# Patient Record
Sex: FEMALE | Race: WHITE | NOT HISPANIC OR LATINO | Employment: FULL TIME | URBAN - METROPOLITAN AREA
[De-identification: names, ages, dates, MRNs, and addresses within clinical notes are randomized per-mention and may not be internally consistent; named-entity substitution may affect disease eponyms.]

---

## 2023-02-28 PROBLEM — M50.30 DEGENERATION OF CERVICAL INTERVERTEBRAL DISC: Status: ACTIVE | Noted: 2023-02-28

## 2023-02-28 PROBLEM — M51.36 DEGENERATION OF LUMBAR INTERVERTEBRAL DISC: Status: ACTIVE | Noted: 2023-02-28

## 2023-02-28 PROBLEM — R10.9 ABDOMINAL PAIN: Status: ACTIVE | Noted: 2023-02-28

## 2023-02-28 PROBLEM — M51.369 DEGENERATION OF LUMBAR INTERVERTEBRAL DISC: Status: ACTIVE | Noted: 2023-02-28

## 2023-02-28 PROBLEM — G47.33 OBSTRUCTIVE SLEEP APNEA SYNDROME: Status: ACTIVE | Noted: 2020-06-23

## 2023-02-28 PROBLEM — K76.0 NONALCOHOLIC FATTY LIVER DISEASE: Status: ACTIVE | Noted: 2023-02-28

## 2023-02-28 RX ORDER — FAMOTIDINE 40 MG/1
1 TABLET, FILM COATED ORAL NIGHTLY
COMMUNITY

## 2023-02-28 RX ORDER — OMEPRAZOLE 40 MG/1
1 CAPSULE, DELAYED RELEASE ORAL DAILY
COMMUNITY
Start: 2022-07-11

## 2023-02-28 RX ORDER — HYDROXYZINE HYDROCHLORIDE 25 MG/1
1-2 TABLET, FILM COATED ORAL 2 TIMES DAILY PRN
COMMUNITY
Start: 2022-11-08 | End: 2023-05-08 | Stop reason: SDUPTHER

## 2023-02-28 RX ORDER — IBUPROFEN 600 MG/1
1 TABLET ORAL EVERY 8 HOURS PRN
COMMUNITY

## 2023-03-07 ENCOUNTER — APPOINTMENT (OUTPATIENT)
Dept: PRIMARY CARE | Facility: CLINIC | Age: 43
End: 2023-03-07
Payer: COMMERCIAL

## 2023-03-13 ENCOUNTER — TELEMEDICINE (OUTPATIENT)
Dept: PRIMARY CARE | Facility: CLINIC | Age: 43
End: 2023-03-13
Payer: COMMERCIAL

## 2023-03-13 DIAGNOSIS — J01.90 ACUTE SINUSITIS, RECURRENCE NOT SPECIFIED, UNSPECIFIED LOCATION: Primary | ICD-10-CM

## 2023-03-13 PROCEDURE — 99214 OFFICE O/P EST MOD 30 MIN: CPT | Performed by: FAMILY MEDICINE

## 2023-03-13 RX ORDER — AMOXICILLIN AND CLAVULANATE POTASSIUM 875; 125 MG/1; MG/1
875 TABLET, FILM COATED ORAL 2 TIMES DAILY
Qty: 20 TABLET | Refills: 0 | Status: SHIPPED | OUTPATIENT
Start: 2023-03-13 | End: 2023-03-23

## 2023-03-13 ASSESSMENT — ENCOUNTER SYMPTOMS
SHORTNESS OF BREATH: 0
FEVER: 1
FATIGUE: 0
MYALGIAS: 0
ARTHRALGIAS: 0
DIARRHEA: 0
ABDOMINAL PAIN: 0

## 2023-03-13 NOTE — PROGRESS NOTES
Subjective   Patient ID: Ronda Dean is a 42 y.o. female who presents telemedicine visit. Pt at home. PCP in office    HPI   Cough: onset x 5 days. Started w/ sore throat. Now w/ head congestion, cough, sinus congestion. [facility] to [facility] and told had bronchitis, given tessalon perles and inhaler. Not helping. Getting worse. Cough worse at night. No recent abx    Review of Systems   Constitutional:  Positive for fever. Negative for fatigue.   HENT:  Negative for congestion and postnasal drip.    Respiratory:  Negative for shortness of breath.    Cardiovascular:  Negative for chest pain.   Gastrointestinal:  Negative for abdominal pain and diarrhea.   Musculoskeletal:  Negative for arthralgias and myalgias.       Objective   There were no vitals taken for this visit.    Physical Exam  Pulmonary:      Effort: No respiratory distress.   Neurological:      Mental Status: She is alert.   Psychiatric:         Mood and Affect: Mood normal.         Assessment/Plan   Problem List Items Addressed This Visit    None  Visit Diagnoses       Acute sinusitis, recurrence not specified, unspecified location    -  Primary    Relevant Medications    amoxicillin-pot clavulanate (Augmentin) 875-125 mg tablet

## 2023-03-13 NOTE — PATIENT INSTRUCTIONS
Start antibiotic. Take w food and water.     Follow up if your symptoms are not improving but recommend you do take a home COVID test to appropriately quarantine if needed.

## 2023-03-15 ENCOUNTER — TELEPHONE (OUTPATIENT)
Dept: PRIMARY CARE | Facility: CLINIC | Age: 43
End: 2023-03-15
Payer: COMMERCIAL

## 2023-03-15 NOTE — TELEPHONE ENCOUNTER
If pts sinus symptoms better, ok to let the rest of her infection take its course. If numbness did not go away, needs seen. Go to ER if symptoms sig worsening  - IEL      [place] and informed [place] [facility] [place]

## 2023-03-15 NOTE — TELEPHONE ENCOUNTER
Patient was in with IEL was given Amoxicillin her face, lips and nose is numb. I told her not to take anymore until she hears from us and just took a pill at 10:00. [facility] [place]

## 2023-05-08 ENCOUNTER — TELEMEDICINE (OUTPATIENT)
Dept: PRIMARY CARE | Facility: CLINIC | Age: 43
End: 2023-05-08
Payer: COMMERCIAL

## 2023-05-08 DIAGNOSIS — G47.00 INSOMNIA, UNSPECIFIED TYPE: ICD-10-CM

## 2023-05-08 DIAGNOSIS — R51.9 NONINTRACTABLE HEADACHE, UNSPECIFIED CHRONICITY PATTERN, UNSPECIFIED HEADACHE TYPE: ICD-10-CM

## 2023-05-08 DIAGNOSIS — F32.A DEPRESSIVE DISORDER: Primary | ICD-10-CM

## 2023-05-08 PROBLEM — G89.29 CHRONIC ABDOMINAL PAIN: Status: ACTIVE | Noted: 2023-02-28

## 2023-05-08 PROBLEM — R74.8 ELEVATED LIVER ENZYMES: Status: ACTIVE | Noted: 2023-05-08

## 2023-05-08 PROBLEM — R32 URINARY INCONTINENCE: Status: ACTIVE | Noted: 2023-05-08

## 2023-05-08 PROBLEM — M48.02 SPINAL STENOSIS OF CERVICAL REGION: Status: ACTIVE | Noted: 2023-05-08

## 2023-05-08 PROBLEM — M62.89 HIGH-TONE PELVIC FLOOR DYSFUNCTION: Status: ACTIVE | Noted: 2023-05-08

## 2023-05-08 PROBLEM — K42.9 UMBILICAL HERNIA: Status: ACTIVE | Noted: 2023-05-08

## 2023-05-08 PROBLEM — M25.811 SHOULDER IMPINGEMENT, RIGHT: Status: ACTIVE | Noted: 2023-05-08

## 2023-05-08 PROBLEM — M25.561 KNEE PAIN, RIGHT: Status: ACTIVE | Noted: 2023-05-08

## 2023-05-08 PROBLEM — M50.20 HNP (HERNIATED NUCLEUS PULPOSUS), CERVICAL: Status: ACTIVE | Noted: 2023-02-28

## 2023-05-08 PROBLEM — M51.27 HERNIATED NUCLEUS PULPOSUS OF LUMBOSACRAL REGION: Status: ACTIVE | Noted: 2023-02-28

## 2023-05-08 PROBLEM — M79.2 NEUROPATHIC PAIN OF CHEST: Status: RESOLVED | Noted: 2023-05-08 | Resolved: 2023-05-08

## 2023-05-08 PROBLEM — M50.10 HERNIATION OF CERVICAL INTERVERTEBRAL DISC WITH RADICULOPATHY: Status: ACTIVE | Noted: 2023-05-08

## 2023-05-08 PROBLEM — M47.816 LUMBAR SPONDYLOSIS: Status: ACTIVE | Noted: 2023-05-08

## 2023-05-08 PROBLEM — M75.51 BURSITIS OF RIGHT SHOULDER: Status: ACTIVE | Noted: 2023-05-08

## 2023-05-08 PROBLEM — N20.0 NEPHROLITHIASIS: Status: RESOLVED | Noted: 2023-05-08 | Resolved: 2023-05-08

## 2023-05-08 PROBLEM — R30.0 DYSURIA: Status: RESOLVED | Noted: 2023-05-08 | Resolved: 2023-05-08

## 2023-05-08 PROBLEM — M54.2 CERVICALGIA: Status: ACTIVE | Noted: 2023-05-08

## 2023-05-08 PROBLEM — M19.011 ARTHRITIS OF RIGHT ACROMIOCLAVICULAR JOINT: Status: ACTIVE | Noted: 2023-05-08

## 2023-05-08 PROBLEM — N64.4 PAIN OF RIGHT BREAST: Status: RESOLVED | Noted: 2023-05-08 | Resolved: 2023-05-08

## 2023-05-08 PROBLEM — R39.15 URINARY URGENCY: Status: RESOLVED | Noted: 2023-05-08 | Resolved: 2023-05-08

## 2023-05-08 PROBLEM — K29.60 GASTRITIS, EROSIVE: Status: RESOLVED | Noted: 2023-05-08 | Resolved: 2023-05-08

## 2023-05-08 PROBLEM — K31.89 SUBEPITHELIAL GASTRIC MASS: Status: ACTIVE | Noted: 2023-05-08

## 2023-05-08 PROBLEM — S31.105A OPEN WOUND OF UMBILICAL REGION: Status: RESOLVED | Noted: 2023-05-08 | Resolved: 2023-05-08

## 2023-05-08 PROBLEM — R63.5 WEIGHT GAIN: Status: ACTIVE | Noted: 2023-05-08

## 2023-05-08 PROBLEM — M54.16 LUMBAR RADICULOPATHY: Status: ACTIVE | Noted: 2023-05-08

## 2023-05-08 PROBLEM — N89.8 VAGINAL DISCHARGE: Status: RESOLVED | Noted: 2023-05-08 | Resolved: 2023-05-08

## 2023-05-08 PROCEDURE — 99214 OFFICE O/P EST MOD 30 MIN: CPT | Performed by: FAMILY MEDICINE

## 2023-05-08 RX ORDER — BUPROPION HYDROCHLORIDE 150 MG/1
150 TABLET ORAL EVERY MORNING
Qty: 30 TABLET | Refills: 1 | Status: SHIPPED | OUTPATIENT
Start: 2023-05-08 | End: 2023-05-17 | Stop reason: SINTOL

## 2023-05-08 RX ORDER — HYDROXYZINE HYDROCHLORIDE 25 MG/1
25-50 TABLET, FILM COATED ORAL 2 TIMES DAILY PRN
Qty: 30 TABLET | Refills: 0 | Status: SHIPPED | OUTPATIENT
Start: 2023-05-08 | End: 2023-08-07

## 2023-05-08 ASSESSMENT — ENCOUNTER SYMPTOMS
DIZZINESS: 0
NAUSEA: 0
ARTHRALGIAS: 0
SHORTNESS OF BREATH: 0
FATIGUE: 1
ABDOMINAL PAIN: 0
MYALGIAS: 0
DYSURIA: 0
VOMITING: 0
PALPITATIONS: 0
DIARRHEA: 0
CONSTIPATION: 0
HEADACHES: 0

## 2023-05-08 NOTE — PATIENT INSTRUCTIONS
Restart Wellbutrin and prn hydroxy    Start quilipta.    Follow up with eye doctor to check your vision and eye symptoms.     Go to the ER if any of your symptoms are significantly worsening.     Follow up in 1 mo, sooner if needed

## 2023-05-08 NOTE — PROGRESS NOTES
"Subjective   Patient ID: Ronda Dean is a 43 y.o. female who presents for telemed visi. Pt at home. PCP in office. Pt consents to treatment    HPI   Mood: had flare after cleaning out moms home. Feels \"dover\". Appetite up. +crying spells. Not sleeping well. No SI/HI. Feeling dad and anxious. Restarted old wellbutrin. Helped but ran out. Also ran out of hydroxyzine    Headaches: having more headaches. Sister on quilipta. Would like to trial. Occ causing some N/vomiting. Left eye also twitching more. Causing some vision changes      Review of Systems   Constitutional:  Positive for fatigue.   HENT: Negative.     Eyes:         As above   Respiratory:  Negative for shortness of breath.    Cardiovascular:  Negative for chest pain and palpitations.   Gastrointestinal:  Negative for abdominal pain, constipation, diarrhea, nausea and vomiting.   Genitourinary:  Negative for dysuria.   Musculoskeletal:  Negative for arthralgias and myalgias.   Skin:  Negative for rash.   Neurological:  Negative for dizziness and headaches.   Psychiatric/Behavioral:          As above       Objective   There were no vitals taken for this visit.    Physical Exam  HENT:      Head: Normocephalic.      Nose:      Comments: No conversational dyspnea  Pulmonary:      Effort: No respiratory distress.   Neurological:      General: No focal deficit present.      Mental Status: She is alert.   Psychiatric:         Mood and Affect: Mood normal.      Comments: Occ tearful         Assessment/Plan   Problem List Items Addressed This Visit          Other    Depressive disorder - Primary    Relevant Medications    buPROPion XL (Wellbutrin XL) 150 mg 24 hr tablet     Other Visit Diagnoses       Nonintractable headache, unspecified chronicity pattern, unspecified headache type        Relevant Medications    atogepant 10 mg tablet    Insomnia, unspecified type        Relevant Medications    hydrOXYzine HCL (Atarax) 25 mg tablet        Consider cymbalta if " does not do well w/ meds above per pt request.      Telemed 16 min

## 2023-05-09 ENCOUNTER — TELEPHONE (OUTPATIENT)
Dept: PRIMARY CARE | Facility: CLINIC | Age: 43
End: 2023-05-09
Payer: COMMERCIAL

## 2023-05-10 ENCOUNTER — TELEPHONE (OUTPATIENT)
Dept: PRIMARY CARE | Facility: CLINIC | Age: 43
End: 2023-05-10
Payer: COMMERCIAL

## 2023-05-10 NOTE — TELEPHONE ENCOUNTER
Pt called and said she got a referral on the portal shes not sure what its for it doesn't say anything on it. She has been seen for ha's so she thought maybe that's what it is. I didn't see anything in her chart so I wasn't sure either. Please advise/ mk

## 2023-05-12 NOTE — TELEPHONE ENCOUNTER
Patient called and stated that if we do not haile her Prior Auth Urgent for Atogepant it will take 8 days. Patient is asking for us to please haile it Urgent. She really needs this medicine it is for Migraines.

## 2023-05-15 ENCOUNTER — TELEPHONE (OUTPATIENT)
Dept: PRIMARY CARE | Facility: CLINIC | Age: 43
End: 2023-05-15
Payer: COMMERCIAL

## 2023-05-15 NOTE — TELEPHONE ENCOUNTER
Patient states JEREMY put her on Wellbutrin has been on it over a week she can not sleep has been crying and very anxious. IEL had told her if this did not work that she would try her on Cymbalta. Could that be called in?

## 2023-05-17 DIAGNOSIS — F32.A DEPRESSIVE DISORDER: Primary | ICD-10-CM

## 2023-05-17 RX ORDER — DULOXETIN HYDROCHLORIDE 20 MG/1
20 CAPSULE, DELAYED RELEASE ORAL DAILY
Qty: 30 CAPSULE | Refills: 0 | Status: SHIPPED | OUTPATIENT
Start: 2023-05-17 | End: 2023-08-07

## 2023-05-26 ENCOUNTER — TELEPHONE (OUTPATIENT)
Dept: PRIMARY CARE | Facility: CLINIC | Age: 43
End: 2023-05-26
Payer: COMMERCIAL

## 2023-06-08 DIAGNOSIS — F32.A DEPRESSIVE DISORDER: ICD-10-CM

## 2023-06-08 NOTE — TELEPHONE ENCOUNTER
Pharmacy request  Next OV 6/15  Pt should be ok on medication until next appt, please refuse. Thanks, AM

## 2023-06-09 RX ORDER — DULOXETIN HYDROCHLORIDE 20 MG/1
20 CAPSULE, DELAYED RELEASE ORAL DAILY
Qty: 30 CAPSULE | Refills: 0 | OUTPATIENT
Start: 2023-06-09 | End: 2023-07-09

## 2023-06-15 ENCOUNTER — APPOINTMENT (OUTPATIENT)
Dept: PRIMARY CARE | Facility: CLINIC | Age: 43
End: 2023-06-15
Payer: COMMERCIAL

## 2023-07-13 ENCOUNTER — APPOINTMENT (OUTPATIENT)
Dept: PRIMARY CARE | Facility: CLINIC | Age: 43
End: 2023-07-13
Payer: COMMERCIAL

## 2023-08-05 LAB
ALANINE AMINOTRANSFERASE (SGPT) (U/L) IN SER/PLAS: 94 U/L (ref 7–45)
ALBUMIN (G/DL) IN SER/PLAS: 4.4 G/DL (ref 3.4–5)
ALKALINE PHOSPHATASE (U/L) IN SER/PLAS: 81 U/L (ref 33–110)
ANION GAP IN SER/PLAS: 12 MMOL/L (ref 10–20)
ASPARTATE AMINOTRANSFERASE (SGOT) (U/L) IN SER/PLAS: 52 U/L (ref 9–39)
BILIRUBIN TOTAL (MG/DL) IN SER/PLAS: 1.1 MG/DL (ref 0–1.2)
CALCIUM (MG/DL) IN SER/PLAS: 9.5 MG/DL (ref 8.6–10.3)
CARBON DIOXIDE, TOTAL (MMOL/L) IN SER/PLAS: 25 MMOL/L (ref 21–32)
CHLORIDE (MMOL/L) IN SER/PLAS: 109 MMOL/L (ref 98–107)
CHOLESTEROL (MG/DL) IN SER/PLAS: 249 MG/DL (ref 0–199)
CHOLESTEROL IN HDL (MG/DL) IN SER/PLAS: 55.4 MG/DL
CHOLESTEROL/HDL RATIO: 4.5
CREATININE (MG/DL) IN SER/PLAS: 0.76 MG/DL (ref 0.5–1.05)
GFR FEMALE: >90 ML/MIN/1.73M2
GLUCOSE (MG/DL) IN SER/PLAS: 77 MG/DL (ref 74–99)
LDL: 161 MG/DL (ref 0–99)
POTASSIUM (MMOL/L) IN SER/PLAS: 4.2 MMOL/L (ref 3.5–5.3)
PROTEIN TOTAL: 6.4 G/DL (ref 6.4–8.2)
SODIUM (MMOL/L) IN SER/PLAS: 142 MMOL/L (ref 136–145)
TRIGLYCERIDE (MG/DL) IN SER/PLAS: 163 MG/DL (ref 0–149)
UREA NITROGEN (MG/DL) IN SER/PLAS: 15 MG/DL (ref 6–23)
VLDL: 33 MG/DL (ref 0–40)

## 2023-08-07 ENCOUNTER — LAB (OUTPATIENT)
Dept: LAB | Facility: LAB | Age: 43
End: 2023-08-07
Payer: COMMERCIAL

## 2023-08-07 ENCOUNTER — OFFICE VISIT (OUTPATIENT)
Dept: PRIMARY CARE | Facility: CLINIC | Age: 43
End: 2023-08-07
Payer: COMMERCIAL

## 2023-08-07 VITALS
SYSTOLIC BLOOD PRESSURE: 120 MMHG | TEMPERATURE: 97.6 F | OXYGEN SATURATION: 96 % | DIASTOLIC BLOOD PRESSURE: 74 MMHG | BODY MASS INDEX: 26.15 KG/M2 | HEART RATE: 81 BPM | WEIGHT: 162 LBS

## 2023-08-07 DIAGNOSIS — L65.9 HAIR LOSS: ICD-10-CM

## 2023-08-07 DIAGNOSIS — E78.2 MIXED HYPERLIPIDEMIA: Primary | ICD-10-CM

## 2023-08-07 DIAGNOSIS — R74.8 ELEVATED LIVER ENZYMES: ICD-10-CM

## 2023-08-07 DIAGNOSIS — K76.0 NONALCOHOLIC FATTY LIVER DISEASE: ICD-10-CM

## 2023-08-07 DIAGNOSIS — R53.83 OTHER FATIGUE: ICD-10-CM

## 2023-08-07 DIAGNOSIS — R60.9 EDEMA, UNSPECIFIED TYPE: ICD-10-CM

## 2023-08-07 LAB
ALANINE AMINOTRANSFERASE (SGPT) (U/L) IN SER/PLAS: 67 U/L (ref 7–45)
ALBUMIN (G/DL) IN SER/PLAS: 5 G/DL (ref 3.4–5)
ALKALINE PHOSPHATASE (U/L) IN SER/PLAS: 84 U/L (ref 33–110)
ANION GAP IN SER/PLAS: 13 MMOL/L (ref 10–20)
ASPARTATE AMINOTRANSFERASE (SGOT) (U/L) IN SER/PLAS: 31 U/L (ref 9–39)
BASOPHILS (10*3/UL) IN BLOOD BY AUTOMATED COUNT: 0.05 X10E9/L (ref 0–0.1)
BASOPHILS/100 LEUKOCYTES IN BLOOD BY AUTOMATED COUNT: 0.8 % (ref 0–2)
BILIRUBIN TOTAL (MG/DL) IN SER/PLAS: 1.4 MG/DL (ref 0–1.2)
CALCIDIOL (25 OH VITAMIN D3) (NG/ML) IN SER/PLAS: 27 NG/ML
CALCIUM (MG/DL) IN SER/PLAS: 9.8 MG/DL (ref 8.6–10.3)
CARBON DIOXIDE, TOTAL (MMOL/L) IN SER/PLAS: 27 MMOL/L (ref 21–32)
CHLORIDE (MMOL/L) IN SER/PLAS: 106 MMOL/L (ref 98–107)
COBALAMIN (VITAMIN B12) (PG/ML) IN SER/PLAS: 360 PG/ML (ref 211–911)
CREATININE (MG/DL) IN SER/PLAS: 0.83 MG/DL (ref 0.5–1.05)
EOSINOPHILS (10*3/UL) IN BLOOD BY AUTOMATED COUNT: 0.07 X10E9/L (ref 0–0.7)
EOSINOPHILS/100 LEUKOCYTES IN BLOOD BY AUTOMATED COUNT: 1.2 % (ref 0–6)
ERYTHROCYTE DISTRIBUTION WIDTH (RATIO) BY AUTOMATED COUNT: 12.8 % (ref 11.5–14.5)
ERYTHROCYTE MEAN CORPUSCULAR HEMOGLOBIN CONCENTRATION (G/DL) BY AUTOMATED: 33.2 G/DL (ref 32–36)
ERYTHROCYTE MEAN CORPUSCULAR VOLUME (FL) BY AUTOMATED COUNT: 90 FL (ref 80–100)
ERYTHROCYTES (10*6/UL) IN BLOOD BY AUTOMATED COUNT: 4.68 X10E12/L (ref 4–5.2)
GFR FEMALE: >90 ML/MIN/1.73M2
GLUCOSE (MG/DL) IN SER/PLAS: 79 MG/DL (ref 74–99)
HEMATOCRIT (%) IN BLOOD BY AUTOMATED COUNT: 41.9 % (ref 36–46)
HEMOGLOBIN (G/DL) IN BLOOD: 13.9 G/DL (ref 12–16)
IMMATURE GRANULOCYTES/100 LEUKOCYTES IN BLOOD BY AUTOMATED COUNT: 0.3 % (ref 0–0.9)
LEUKOCYTES (10*3/UL) IN BLOOD BY AUTOMATED COUNT: 6.1 X10E9/L (ref 4.4–11.3)
LYMPHOCYTES (10*3/UL) IN BLOOD BY AUTOMATED COUNT: 2.49 X10E9/L (ref 1.2–4.8)
LYMPHOCYTES/100 LEUKOCYTES IN BLOOD BY AUTOMATED COUNT: 41 % (ref 13–44)
MONOCYTES (10*3/UL) IN BLOOD BY AUTOMATED COUNT: 0.42 X10E9/L (ref 0.1–1)
MONOCYTES/100 LEUKOCYTES IN BLOOD BY AUTOMATED COUNT: 6.9 % (ref 2–10)
NEUTROPHILS (10*3/UL) IN BLOOD BY AUTOMATED COUNT: 3.02 X10E9/L (ref 1.2–7.7)
NEUTROPHILS/100 LEUKOCYTES IN BLOOD BY AUTOMATED COUNT: 49.8 % (ref 40–80)
PLATELETS (10*3/UL) IN BLOOD AUTOMATED COUNT: 181 X10E9/L (ref 150–450)
POTASSIUM (MMOL/L) IN SER/PLAS: 3.9 MMOL/L (ref 3.5–5.3)
PROTEIN TOTAL: 7 G/DL (ref 6.4–8.2)
SODIUM (MMOL/L) IN SER/PLAS: 142 MMOL/L (ref 136–145)
THYROTROPIN (MIU/L) IN SER/PLAS BY DETECTION LIMIT <= 0.05 MIU/L: 1.97 MIU/L (ref 0.44–3.98)
UREA NITROGEN (MG/DL) IN SER/PLAS: 12 MG/DL (ref 6–23)

## 2023-08-07 PROCEDURE — 82306 VITAMIN D 25 HYDROXY: CPT

## 2023-08-07 PROCEDURE — 1036F TOBACCO NON-USER: CPT | Performed by: FAMILY MEDICINE

## 2023-08-07 PROCEDURE — 99214 OFFICE O/P EST MOD 30 MIN: CPT | Performed by: FAMILY MEDICINE

## 2023-08-07 PROCEDURE — 86038 ANTINUCLEAR ANTIBODIES: CPT

## 2023-08-07 PROCEDURE — 82607 VITAMIN B-12: CPT

## 2023-08-07 PROCEDURE — 84443 ASSAY THYROID STIM HORMONE: CPT

## 2023-08-07 PROCEDURE — 80074 ACUTE HEPATITIS PANEL: CPT

## 2023-08-07 PROCEDURE — 36415 COLL VENOUS BLD VENIPUNCTURE: CPT

## 2023-08-07 PROCEDURE — 80053 COMPREHEN METABOLIC PANEL: CPT

## 2023-08-07 PROCEDURE — 85025 COMPLETE CBC W/AUTO DIFF WBC: CPT

## 2023-08-07 RX ORDER — METFORMIN HYDROCHLORIDE 500 MG/1
TABLET, EXTENDED RELEASE ORAL
COMMUNITY
Start: 2023-05-25 | End: 2024-04-08 | Stop reason: ALTCHOICE

## 2023-08-07 ASSESSMENT — ENCOUNTER SYMPTOMS
DIARRHEA: 0
PALPITATIONS: 0
FATIGUE: 1
DIFFICULTY URINATING: 0
SLEEP DISTURBANCE: 0
POLYDIPSIA: 0
DYSURIA: 0
DYSPHORIC MOOD: 0
ARTHRALGIAS: 0
DIZZINESS: 0
SHORTNESS OF BREATH: 0
VOMITING: 0
CONSTIPATION: 0
NAUSEA: 0
HEADACHES: 0
BLOOD IN STOOL: 0
POLYPHAGIA: 0

## 2023-08-07 NOTE — PROGRESS NOTES
Subjective   Patient ID: Ronda Dean is a 43 y.o. female who presents for Hyperlipidemia and Elevated LFTs (Recheck. Review labs.) and multiple issues.     Hyperlipidemia  Pertinent negatives include no chest pain or shortness of breath.      Lipids: high. HDL 55, (146), . Has been working with nutrition.   LFTs: high. ALT 94, AST 52. Told did not have fatty liver by GI. Has not seen hepatology. Has chronic RUQ pain  Edema: noticing more when active, mostly hands, feet, legs.   Hair loss: onset x 1-2 months ago.     Review of Systems   Constitutional:  Positive for fatigue.   HENT: Negative.     Eyes:  Negative for visual disturbance.   Respiratory:  Negative for shortness of breath.    Cardiovascular:  Negative for chest pain and palpitations.   Gastrointestinal:  Negative for blood in stool, constipation, diarrhea, nausea and vomiting.   Endocrine: Negative for cold intolerance, heat intolerance, polydipsia, polyphagia and polyuria.   Genitourinary:  Negative for difficulty urinating and dysuria.   Musculoskeletal:  Negative for arthralgias.   Skin:  Negative for rash.   Neurological:  Negative for dizziness and headaches.   Psychiatric/Behavioral:  Negative for dysphoric mood and sleep disturbance.        Objective   /74   Pulse 81   Temp 36.4 °C (97.6 °F)   Wt 73.5 kg (162 lb)   SpO2 96%   BMI 26.15 kg/m²     Physical Exam  Nursing note reviewed.   Constitutional:       General: She is not in acute distress.     Appearance: Normal appearance. She is not toxic-appearing.   HENT:      Head: Normocephalic.      Mouth/Throat:      Pharynx: Oropharynx is clear.   Eyes:      Pupils: Pupils are equal, round, and reactive to light.   Cardiovascular:      Rate and Rhythm: Normal rate and regular rhythm.      Pulses: Normal pulses.      Heart sounds: No murmur heard.  Pulmonary:      Effort: Pulmonary effort is normal. No respiratory distress.      Breath sounds: Normal breath sounds.    Abdominal:      General: Bowel sounds are normal.      Palpations: Abdomen is soft.      Tenderness: There is no abdominal tenderness. There is no guarding.   Musculoskeletal:         General: No tenderness.   Skin:     General: Skin is warm.      Comments: No obvious hair loss   Neurological:      General: No focal deficit present.      Mental Status: She is alert.      Cranial Nerves: No cranial nerve deficit.   Psychiatric:         Mood and Affect: Mood normal.         Assessment/Plan   Problem List Items Addressed This Visit          Cardiac and Vasculature    Mixed hyperlipidemia - Primary       Gastrointestinal and Abdominal    Nonalcoholic fatty liver disease    Relevant Orders    Comprehensive Metabolic Panel    Elevated liver enzymes    Relevant Orders    Referral to Hepatology    Hepatitis panel, acute       Skin    Hair loss     Other Visit Diagnoses       Edema, unspecified type        Relevant Orders    KOSTA with Reflex to GOLDIE    Other fatigue        Relevant Orders    Vitamin B12    Vitamin D, Total    CBC and Auto Differential    TSH with reflex to Free T4 if abnormal        Discussed bw

## 2023-08-07 NOTE — PATIENT INSTRUCTIONS
Recommend a predominant whole foods plant based diet.  Cut back on meat, dairy, salt and oils. Increase fiber in your diet.  Decrease alcohol as much as possible if you drink. Recommend regular exercise most days of the week.    You were referred for further blood work and to liver specialist    Go to the ER if any of your symptoms are significantly worsening.     Follow up in 3 months, sooner if needed

## 2023-08-08 ENCOUNTER — TELEPHONE (OUTPATIENT)
Dept: PRIMARY CARE | Facility: CLINIC | Age: 43
End: 2023-08-08
Payer: COMMERCIAL

## 2023-08-08 LAB
ANTI-NUCLEAR ANTIBODY (ANA): NEGATIVE
HEPATITIS A VIRUS IGM AB PRESENCE IN SER/PLAS BY IMMUNOASSAY: NONREACTIVE
HEPATITIS B VIRUS CORE IGM AB PRESENCE IN SER/PLAS BY IMMUNOASSY: NONREACTIVE
HEPATITIS B VIRUS SURFACE AG PRESENCE IN SERUM: NONREACTIVE
HEPATITIS C VIRUS AB PRESENCE IN SERUM: NONREACTIVE

## 2023-08-08 NOTE — TELEPHONE ENCOUNTER
Pt cannot get into hepatologists office until December 5th, asking if IEL can suggest anything for her to do, is there any other hepatologist she can see?

## 2023-08-11 NOTE — TELEPHONE ENCOUNTER
Pt notified of message she said she did call her GI and they were able to get her in 2 wks. Jose Miguel

## 2023-09-15 LAB
CLUE CELLS: ABNORMAL
NUGENT SCORE: 4
VAGINITIS-BV + YEAST INTERPRETATION: ABNORMAL
YEAST: ABNORMAL

## 2023-09-19 LAB
ALANINE AMINOTRANSFERASE (SGPT) (U/L) IN SER/PLAS: 24 U/L (ref 7–45)
ALBUMIN (G/DL) IN SER/PLAS: 5 G/DL (ref 3.4–5)
ALKALINE PHOSPHATASE (U/L) IN SER/PLAS: 85 U/L (ref 33–110)
ASPARTATE AMINOTRANSFERASE (SGOT) (U/L) IN SER/PLAS: 20 U/L (ref 9–39)
BILIRUBIN DIRECT (MG/DL) IN SER/PLAS: 0.1 MG/DL (ref 0–0.3)
BILIRUBIN TOTAL (MG/DL) IN SER/PLAS: 0.9 MG/DL (ref 0–1.2)
CERULOPLASMIN (MG/DL) IN SER/PLAS: 36 MG/DL (ref 20–60)
FERRITIN (UG/LL) IN SER/PLAS: 199 UG/L (ref 8–150)
IRON (UG/DL) IN SER/PLAS: 76 UG/DL (ref 35–150)
IRON BINDING CAPACITY (UG/DL) IN SER/PLAS: 340 UG/DL (ref 240–445)
IRON SATURATION (%) IN SER/PLAS: 22 % (ref 25–45)
PROTEIN TOTAL: 7.4 G/DL (ref 6.4–8.2)

## 2023-09-20 LAB — ANTI-SMOOTH MUSCLE ANTIBODY: NEGATIVE

## 2023-09-21 ENCOUNTER — APPOINTMENT (OUTPATIENT)
Dept: PRIMARY CARE | Facility: CLINIC | Age: 43
End: 2023-09-21
Payer: COMMERCIAL

## 2023-09-22 ENCOUNTER — APPOINTMENT (OUTPATIENT)
Dept: PRIMARY CARE | Facility: CLINIC | Age: 43
End: 2023-09-22
Payer: COMMERCIAL

## 2023-09-27 ENCOUNTER — OFFICE VISIT (OUTPATIENT)
Dept: PRIMARY CARE | Facility: CLINIC | Age: 43
End: 2023-09-27
Payer: COMMERCIAL

## 2023-09-27 VITALS
SYSTOLIC BLOOD PRESSURE: 120 MMHG | TEMPERATURE: 97.8 F | WEIGHT: 164 LBS | OXYGEN SATURATION: 99 % | HEIGHT: 66 IN | BODY MASS INDEX: 26.36 KG/M2 | DIASTOLIC BLOOD PRESSURE: 78 MMHG | HEART RATE: 74 BPM

## 2023-09-27 DIAGNOSIS — M26.609 TMJ DYSFUNCTION: ICD-10-CM

## 2023-09-27 DIAGNOSIS — Z00.00 WELLNESS EXAMINATION: Primary | ICD-10-CM

## 2023-09-27 PROCEDURE — 1036F TOBACCO NON-USER: CPT | Performed by: FAMILY MEDICINE

## 2023-09-27 PROCEDURE — 99396 PREV VISIT EST AGE 40-64: CPT | Performed by: FAMILY MEDICINE

## 2023-09-27 RX ORDER — ESTRADIOL 1 MG/1
1 TABLET ORAL DAILY
COMMUNITY
Start: 2023-09-13 | End: 2024-04-08 | Stop reason: ALTCHOICE

## 2023-09-27 ASSESSMENT — ENCOUNTER SYMPTOMS
NAUSEA: 0
PALPITATIONS: 0
FATIGUE: 0
CHILLS: 0
DIZZINESS: 0
COUGH: 0
APPETITE CHANGE: 0
SHORTNESS OF BREATH: 0
DIARRHEA: 0
VOMITING: 0
DYSURIA: 0
SLEEP DISTURBANCE: 0
FEVER: 0
SORE THROAT: 0
CONSTIPATION: 0
RHINORRHEA: 0

## 2023-09-27 NOTE — PATIENT INSTRUCTIONS
- Check with insurance to see hepatitis A vaccine to see if covered.    - Look at [employer] massage for TMJ: [email or web address] - Zayra Hobson

## 2023-10-03 ENCOUNTER — APPOINTMENT (OUTPATIENT)
Dept: UROLOGY | Facility: CLINIC | Age: 43
End: 2023-10-03
Payer: COMMERCIAL

## 2023-10-04 ENCOUNTER — TELEPHONE (OUTPATIENT)
Dept: PRIMARY CARE | Facility: CLINIC | Age: 43
End: 2023-10-04

## 2023-10-04 ENCOUNTER — APPOINTMENT (OUTPATIENT)
Dept: OBSTETRICS AND GYNECOLOGY | Facility: CLINIC | Age: 43
End: 2023-10-04
Payer: COMMERCIAL

## 2023-10-04 ENCOUNTER — OFFICE VISIT (OUTPATIENT)
Dept: PRIMARY CARE | Facility: CLINIC | Age: 43
End: 2023-10-04
Payer: COMMERCIAL

## 2023-10-04 VITALS
TEMPERATURE: 97.1 F | HEART RATE: 66 BPM | WEIGHT: 165 LBS | OXYGEN SATURATION: 98 % | DIASTOLIC BLOOD PRESSURE: 74 MMHG | SYSTOLIC BLOOD PRESSURE: 120 MMHG | BODY MASS INDEX: 26.63 KG/M2

## 2023-10-04 DIAGNOSIS — R10.11 RIGHT UPPER QUADRANT ABDOMINAL PAIN: Primary | ICD-10-CM

## 2023-10-04 PROCEDURE — 99213 OFFICE O/P EST LOW 20 MIN: CPT | Performed by: FAMILY MEDICINE

## 2023-10-04 PROCEDURE — 1036F TOBACCO NON-USER: CPT | Performed by: FAMILY MEDICINE

## 2023-10-04 ASSESSMENT — ENCOUNTER SYMPTOMS
APPETITE CHANGE: 0
CHILLS: 0
ABDOMINAL PAIN: 1
FEVER: 0
SHORTNESS OF BREATH: 0

## 2023-10-04 NOTE — PROGRESS NOTES
Assessment/Plan   ASSESSMENT/PLAN:      Ronda was seen today for abdominal pain.  Diagnoses and all orders for this visit:  Right upper quadrant abdominal pain (Primary)  -     CT abdomen pelvis wo IV contrast; Future       Patient Instructions   Abdominal pain: Due to presentation with guarding and + right-sided CVA tenderness, will order CT abdomen pelvis to rule out nephrolithiasis, reviewed previous imaging and patient has history of cholecystectomy.  ED precautions discussed with patient    Oli Ballesteros DO     FUTURE DIRECTION:     Subjective   SUBJECTIVE:     Patient ID: Ronda Dean is a 43 y.o. femalefor the following:    RUQ Pain   Ongoign for the past 3 weeks   Mentions history of cholecystectomy   Feels like something pushing on her right side  Food does not change symptoms   Pain is constant   Denies vomiting,diarrhea, nausea, fevers, chills    Review of Systems   Constitutional:  Negative for appetite change, chills and fever.   Respiratory:  Negative for shortness of breath.    Cardiovascular:  Negative for chest pain.   Gastrointestinal:  Positive for abdominal pain.       Allergies   Allergen Reactions    Pregabalin Other     paranoid    Bupropion Headache     HAs    Gabapentin Other     fatigue    Hydrochlorothiazide Other     N    Naltrexone-Bupropion Headache     headaches    Ondansetron Hcl Other     worsening N    Promethazine Other     nausea / vomiting    Trazodone Headache    Penicillins Rash         Current Outpatient Medications:     ALBUTEROL SULFATE INHL, Inhale 1-2 puffs. Every 4-6 hours as needed Albuterol sulfate  (90 Base) mcg/Act inhalation aerosol, Disp: , Rfl:     estradiol (Estrace) 1 mg tablet, Take 1 tablet (1 mg) by mouth once daily., Disp: , Rfl:     famotidine (Pepcid) 40 mg tablet, Take 1 tablet (40 mg) by mouth once daily at bedtime., Disp: , Rfl:     ibuprofen 600 mg tablet, Take 1 tablet (600 mg) by mouth every 8 hours if needed. W/ food and water,  Disp: , Rfl:     Lactobacillus acidophilus (PROBIOTIC ACIDOPHILUS ORAL), Probiotic Acidophilus oral capsules, Disp: , Rfl:     metFORMIN XR (Glucophage-XR) 500 mg 24 hr tablet, TAKE 1 TABLET BY MOUTH DAILY WITH BREAKFAST FOR 15 DAYS, THEN 2 TABLETS DAILY WITH BREAKFAST., Disp: , Rfl:     omeprazole (PriLOSEC) 40 mg DR capsule, Take 1 capsule (40 mg) by mouth once daily., Disp: , Rfl:     VITAMIN B COMPLEX ORAL, Vitamin B Complex oral tablets, Disp: , Rfl:      Patient Active Problem List   Diagnosis    Mixed hyperlipidemia    Depressive disorder    Irritable bowel syndrome    Hyperlipidemia    Abnormal liver function tests    Obstructive sleep apnea syndrome    Chronic abdominal pain    HNP (herniated nucleus pulposus), cervical    Herniated nucleus pulposus of lumbosacral region    Nonalcoholic fatty liver disease    Arthritis of right acromioclavicular joint    Bursitis of right shoulder    Spinal stenosis of cervical region    Cervicalgia    Chronic migraine    Elevated liver enzymes    High-tone pelvic floor dysfunction    Knee pain, right    Lumbar spondylosis    Shoulder impingement, right    Subepithelial gastric mass    Umbilical hernia    Weight gain    Urinary incontinence    Herniation of cervical intervertebral disc with radiculopathy    Lumbar radiculopathy    Hair loss    TMJ dysfunction       Social History     Socioeconomic History    Marital status:      Spouse name: Not on file    Number of children: Not on file    Years of education: Not on file    Highest education level: Not on file   Occupational History    Not on file   Tobacco Use    Smoking status: Never    Smokeless tobacco: Never   Substance and Sexual Activity    Alcohol use: Not on file    Drug use: Not on file    Sexual activity: Not on file   Other Topics Concern    Not on file   Social History Narrative    Not on file     Social Determinants of Health     Financial Resource Strain: Not on file   Food Insecurity: Not on file    Transportation Needs: Not on file   Physical Activity: Not on file   Stress: Not on file   Social Connections: Not on file   Intimate Partner Violence: Not on file   Housing Stability: Not on file       Objective   OBJECTIVE:     Vitals:    10/04/23 1542   BP: 120/74   Pulse: 66   Temp: 36.2 °C (97.1 °F)   SpO2: 98%       Exam      Physical Exam  Constitutional:       Appearance: Normal appearance.   HENT:      Head: Normocephalic.   Cardiovascular:      Rate and Rhythm: Normal rate and regular rhythm.      Heart sounds: No murmur heard.  Pulmonary:      Effort: Pulmonary effort is normal.      Breath sounds: No wheezing.   Abdominal:      Tenderness: There is abdominal tenderness. There is right CVA tenderness and guarding. There is no left CVA tenderness.   Musculoskeletal:      Cervical back: Normal range of motion.   Neurological:      Mental Status: She is alert.   Psychiatric:         Mood and Affect: Mood normal.

## 2023-10-04 NOTE — TELEPHONE ENCOUNTER
Patient in office needs Stat CT abd Pelvis Case# [id number] Auth# [id number] good from 10-4-2023- 4-1-2023   After cataract, bilateral    H/O bilateral inguinal hernia repair    S/P CABG (coronary artery bypass graft)

## 2023-10-04 NOTE — PATIENT INSTRUCTIONS
Abdominal pain: Due to presentation with guarding and + right-sided CVA tenderness, will order CT abdomen pelvis to rule out nephrolithiasis, reviewed previous imaging and patient has history of cholecystectomy.  ED precautions discussed with patient

## 2023-10-05 ENCOUNTER — HOSPITAL ENCOUNTER (OUTPATIENT)
Dept: RADIOLOGY | Facility: HOSPITAL | Age: 43
Discharge: HOME | End: 2023-10-05
Payer: COMMERCIAL

## 2023-10-05 DIAGNOSIS — R10.11 RIGHT UPPER QUADRANT ABDOMINAL PAIN: ICD-10-CM

## 2023-10-05 PROCEDURE — 74176 CT ABD & PELVIS W/O CONTRAST: CPT | Mod: FOREIGN READ | Performed by: RADIOLOGY

## 2023-10-05 PROCEDURE — 74176 CT ABD & PELVIS W/O CONTRAST: CPT

## 2023-10-06 ENCOUNTER — TELEPHONE (OUTPATIENT)
Dept: PRIMARY CARE | Facility: CLINIC | Age: 43
End: 2023-10-06
Payer: COMMERCIAL

## 2023-10-06 DIAGNOSIS — R10.11 RIGHT UPPER QUADRANT ABDOMINAL PAIN: Primary | ICD-10-CM

## 2023-10-06 RX ORDER — TRAMADOL HYDROCHLORIDE 50 MG/1
50 TABLET ORAL EVERY 6 HOURS PRN
Qty: 15 TABLET | Refills: 0 | Status: SHIPPED | OUTPATIENT
Start: 2023-10-06 | End: 2023-10-13

## 2023-10-06 NOTE — TELEPHONE ENCOUNTER
Reviewed results with patient. She states that pain has not improved. Discussed that the kidney stone could cause pain however her pain is localized on the right side and the kidney stone was found on the left. Will try short course of tramadol. Pt advised to Call GI for further evaluation.   Pt agreed with plan.

## 2023-10-08 ENCOUNTER — HOSPITAL ENCOUNTER (EMERGENCY)
Facility: HOSPITAL | Age: 43
Discharge: HOME | End: 2023-10-08
Attending: EMERGENCY MEDICINE | Admitting: EMERGENCY MEDICINE
Payer: COMMERCIAL

## 2023-10-08 ENCOUNTER — APPOINTMENT (OUTPATIENT)
Dept: RADIOLOGY | Facility: HOSPITAL | Age: 43
End: 2023-10-08
Payer: COMMERCIAL

## 2023-10-08 VITALS
HEIGHT: 66 IN | BODY MASS INDEX: 25.88 KG/M2 | DIASTOLIC BLOOD PRESSURE: 92 MMHG | HEART RATE: 81 BPM | SYSTOLIC BLOOD PRESSURE: 134 MMHG | OXYGEN SATURATION: 99 % | TEMPERATURE: 98.1 F | WEIGHT: 161 LBS | RESPIRATION RATE: 16 BRPM

## 2023-10-08 DIAGNOSIS — R10.9 ABDOMINAL PAIN, UNSPECIFIED ABDOMINAL LOCATION: Primary | ICD-10-CM

## 2023-10-08 LAB
ALBUMIN SERPL BCP-MCNC: 4.9 G/DL (ref 3.4–5)
ALP SERPL-CCNC: 90 U/L (ref 33–110)
ALT SERPL W P-5'-P-CCNC: 43 U/L (ref 7–45)
ANION GAP SERPL CALC-SCNC: 12 MMOL/L (ref 10–20)
APPEARANCE UR: CLEAR
AST SERPL W P-5'-P-CCNC: 31 U/L (ref 9–39)
BASOPHILS # BLD AUTO: 0.04 X10*3/UL (ref 0–0.1)
BASOPHILS NFR BLD AUTO: 0.8 %
BILIRUB SERPL-MCNC: 1.9 MG/DL (ref 0–1.2)
BILIRUB UR STRIP.AUTO-MCNC: NEGATIVE MG/DL
BUN SERPL-MCNC: 11 MG/DL (ref 6–23)
CALCIUM SERPL-MCNC: 9.8 MG/DL (ref 8.6–10.3)
CHLORIDE SERPL-SCNC: 106 MMOL/L (ref 98–107)
CO2 SERPL-SCNC: 25 MMOL/L (ref 21–32)
COLOR UR: ABNORMAL
CREAT SERPL-MCNC: 0.79 MG/DL (ref 0.5–1.05)
EOSINOPHIL # BLD AUTO: 0.08 X10*3/UL (ref 0–0.7)
EOSINOPHIL NFR BLD AUTO: 1.6 %
ERYTHROCYTE [DISTWIDTH] IN BLOOD BY AUTOMATED COUNT: 12.1 % (ref 11.5–14.5)
GFR SERPL CREATININE-BSD FRML MDRD: >90 ML/MIN/1.73M*2
GLUCOSE SERPL-MCNC: 86 MG/DL (ref 74–99)
GLUCOSE UR STRIP.AUTO-MCNC: NEGATIVE MG/DL
HCT VFR BLD AUTO: 44.1 % (ref 36–46)
HETEROPH AB SERPLBLD QL IA.RAPID: NEGATIVE
HGB BLD-MCNC: 14.9 G/DL (ref 12–16)
IMM GRANULOCYTES # BLD AUTO: 0.01 X10*3/UL (ref 0–0.7)
IMM GRANULOCYTES NFR BLD AUTO: 0.2 % (ref 0–0.9)
KETONES UR STRIP.AUTO-MCNC: NEGATIVE MG/DL
LEUKOCYTE ESTERASE UR QL STRIP.AUTO: NEGATIVE
LIPASE SERPL-CCNC: 17 U/L (ref 9–82)
LYMPHOCYTES # BLD AUTO: 2.2 X10*3/UL (ref 1.2–4.8)
LYMPHOCYTES NFR BLD AUTO: 43.9 %
MCH RBC QN AUTO: 30.1 PG (ref 26–34)
MCHC RBC AUTO-ENTMCNC: 33.8 G/DL (ref 32–36)
MCV RBC AUTO: 89 FL (ref 80–100)
MONOCYTES # BLD AUTO: 0.35 X10*3/UL (ref 0.1–1)
MONOCYTES NFR BLD AUTO: 7 %
NEUTROPHILS # BLD AUTO: 2.33 X10*3/UL (ref 1.2–7.7)
NEUTROPHILS NFR BLD AUTO: 46.5 %
NITRITE UR QL STRIP.AUTO: NEGATIVE
NRBC BLD-RTO: 0 /100 WBCS (ref 0–0)
PH UR STRIP.AUTO: 7 [PH]
PLATELET # BLD AUTO: 163 X10*3/UL (ref 150–450)
PMV BLD AUTO: 12 FL (ref 7.5–11.5)
POTASSIUM SERPL-SCNC: 3.7 MMOL/L (ref 3.5–5.3)
PROT SERPL-MCNC: 7.4 G/DL (ref 6.4–8.2)
PROT UR STRIP.AUTO-MCNC: NEGATIVE MG/DL
RBC # BLD AUTO: 4.95 X10*6/UL (ref 4–5.2)
RBC # UR STRIP.AUTO: NEGATIVE /UL
SODIUM SERPL-SCNC: 139 MMOL/L (ref 136–145)
SP GR UR STRIP.AUTO: 1
UROBILINOGEN UR STRIP.AUTO-MCNC: <2 MG/DL
WBC # BLD AUTO: 5 X10*3/UL (ref 4.4–11.3)

## 2023-10-08 PROCEDURE — 2550000001 HC RX 255 CONTRASTS: Performed by: EMERGENCY MEDICINE

## 2023-10-08 PROCEDURE — 86308 HETEROPHILE ANTIBODY SCREEN: CPT | Performed by: NURSE PRACTITIONER

## 2023-10-08 PROCEDURE — 83690 ASSAY OF LIPASE: CPT | Performed by: NURSE PRACTITIONER

## 2023-10-08 PROCEDURE — 85025 COMPLETE CBC W/AUTO DIFF WBC: CPT | Performed by: NURSE PRACTITIONER

## 2023-10-08 PROCEDURE — 74177 CT ABD & PELVIS W/CONTRAST: CPT | Performed by: RADIOLOGY

## 2023-10-08 PROCEDURE — 96374 THER/PROPH/DIAG INJ IV PUSH: CPT | Mod: XU | Performed by: EMERGENCY MEDICINE

## 2023-10-08 PROCEDURE — 36415 COLL VENOUS BLD VENIPUNCTURE: CPT | Performed by: NURSE PRACTITIONER

## 2023-10-08 PROCEDURE — 81003 URINALYSIS AUTO W/O SCOPE: CPT | Performed by: NURSE PRACTITIONER

## 2023-10-08 PROCEDURE — 2500000004 HC RX 250 GENERAL PHARMACY W/ HCPCS (ALT 636 FOR OP/ED): Performed by: NURSE PRACTITIONER

## 2023-10-08 PROCEDURE — 96361 HYDRATE IV INFUSION ADD-ON: CPT | Performed by: EMERGENCY MEDICINE

## 2023-10-08 PROCEDURE — 99284 EMERGENCY DEPT VISIT MOD MDM: CPT | Mod: 25 | Performed by: EMERGENCY MEDICINE

## 2023-10-08 PROCEDURE — 74177 CT ABD & PELVIS W/CONTRAST: CPT

## 2023-10-08 PROCEDURE — 80053 COMPREHEN METABOLIC PANEL: CPT | Performed by: NURSE PRACTITIONER

## 2023-10-08 RX ORDER — KETOROLAC TROMETHAMINE 30 MG/ML
15 INJECTION, SOLUTION INTRAMUSCULAR; INTRAVENOUS ONCE
Status: COMPLETED | OUTPATIENT
Start: 2023-10-08 | End: 2023-10-08

## 2023-10-08 RX ADMIN — SODIUM CHLORIDE 1000 ML: 9 INJECTION, SOLUTION INTRAVENOUS at 11:38

## 2023-10-08 RX ADMIN — KETOROLAC TROMETHAMINE 15 MG: 30 INJECTION, SOLUTION INTRAMUSCULAR at 11:38

## 2023-10-08 RX ADMIN — IOHEXOL 75 ML: 350 INJECTION, SOLUTION INTRAVENOUS at 14:03

## 2023-10-08 ASSESSMENT — COLUMBIA-SUICIDE SEVERITY RATING SCALE - C-SSRS
2. HAVE YOU ACTUALLY HAD ANY THOUGHTS OF KILLING YOURSELF?: NO
6. HAVE YOU EVER DONE ANYTHING, STARTED TO DO ANYTHING, OR PREPARED TO DO ANYTHING TO END YOUR LIFE?: NO
1. IN THE PAST MONTH, HAVE YOU WISHED YOU WERE DEAD OR WISHED YOU COULD GO TO SLEEP AND NOT WAKE UP?: NO

## 2023-10-08 ASSESSMENT — PAIN - FUNCTIONAL ASSESSMENT: PAIN_FUNCTIONAL_ASSESSMENT: 0-10

## 2023-10-08 NOTE — ED PROVIDER NOTES
HPI   Chief Complaint   Patient presents with    Abdominal Pain       Ronda Dean is a 43 y.o. female with history of cholecystectomy and hysterectomy who presents with RUQ and RLQ abdominal pain. Patient has taken OTC treatment (tylenol and ibuprofen) with no relief of symptoms.     - Symptom began 2 weeks prior to arrival.   - Severity: moderate   - Timing: constant   - Quality: aching   - Pain is exacerbated by nothing in particular. Pain radiates to no where.   - Pain is not exacerbated by palpation.   - Symptoms are associated with no additional symptoms .   - Symptoms are not associated with anorexia, bloody diarrhea, bloody vomiting, chills, constipation, diarrhea, fever, nausea, polydipsia, polyuria, urinary symptoms, vomiting, and weight loss.   - Improved by nothing.   - Not improved by OTC medications.                            No data recorded                Patient History   Past Medical History:   Diagnosis Date    Dysuria 05/08/2023    Hyperlipidemia, unspecified 04/14/2016    Dyslipidemia    Neuropathic pain of chest 05/08/2023    Pain of right breast 05/08/2023    Personal history of other diseases of the digestive system 07/07/2020    History of fatty infiltration of liver    Urinary urgency 05/08/2023    Vaginal discharge 05/08/2023     Past Surgical History:   Procedure Laterality Date    CERVICAL BIOPSY  W/ LOOP ELECTRODE EXCISION  04/16/2015    Cervical Loop Electrosurgical Excision (LEEP)    CHOLECYSTECTOMY  04/16/2015    Cholecystectomy Laparoscopic    HYSTERECTOMY  09/07/2021    Hysterectomy    OTHER SURGICAL HISTORY  07/08/2020    Exploratory laparotomy    OTHER SURGICAL HISTORY  07/08/2020    Umbilical hernia repair    OTHER SURGICAL HISTORY  07/08/2020    Tonsillectomy    OTHER SURGICAL HISTORY  02/16/2021    Esophagogastroduodenoscopy    OTHER SURGICAL HISTORY  02/16/2021    Colonoscopy    TOTAL ABDOMINAL HYSTERECTOMY  04/16/2015    Total Abdominal Hysterectomy     Family History    Problem Relation Name Age of Onset    Diabetes Father      Hypertension Father      Diabetes Father's Sister      Stomach cancer Father's Sister      Diabetes Paternal Grandmother      Hypertension Paternal Grandmother      Coronary artery disease Paternal Grandmother          MI>60s    Diabetes Paternal Cousin       Social History     Tobacco Use    Smoking status: Never    Smokeless tobacco: Never   Substance Use Topics    Alcohol use: Not on file    Drug use: Not on file       Physical Exam   ED Triage Vitals   Temp Heart Rate Resp BP   10/08/23 0954 10/08/23 0955 10/08/23 0955 10/08/23 0955   36.7 °C (98.1 °F) 81 16 (!) 134/92      SpO2 Temp Source Heart Rate Source Patient Position   10/08/23 0955 10/08/23 0954 -- --   99 % Oral        BP Location FiO2 (%)     10/08/23 0955 --     Left arm        Physical Exam  Vitals and nursing note reviewed.   Constitutional:       General: She is not in acute distress.     Appearance: She is well-developed.   HENT:      Head: Normocephalic and atraumatic.   Eyes:      General: No scleral icterus.     Extraocular Movements: Extraocular movements intact.      Conjunctiva/sclera: Conjunctivae normal.      Pupils: Pupils are equal, round, and reactive to light.   Cardiovascular:      Rate and Rhythm: Normal rate and regular rhythm.      Heart sounds: No murmur heard.  Pulmonary:      Effort: Pulmonary effort is normal. No respiratory distress.      Breath sounds: Normal breath sounds.   Abdominal:      General: Bowel sounds are normal. There is no distension.      Palpations: Abdomen is soft.      Tenderness: There is abdominal tenderness in the right upper quadrant and right lower quadrant. There is no right CVA tenderness, left CVA tenderness, guarding or rebound.      Hernia: No hernia is present.   Musculoskeletal:         General: No swelling.      Cervical back: Neck supple.   Skin:     General: Skin is warm and dry.      Capillary Refill: Capillary refill takes less  than 2 seconds.   Neurological:      Mental Status: She is alert.   Psychiatric:         Mood and Affect: Mood normal.         ED Course & MDM   ED Course as of 10/08/23 1841   Sun Oct 08, 2023   1520 Patient remains awake and alert. No change in exam or symptoms with toradol. Discussed results. Discussed plan to discharge and follow with gastroenterology and pcp. She verified she is established with gastroenterology. Has tramadol at home for pain, declined additional medication.  [JG]      ED Course User Index  [JG] Connor Chua, APRN-CNP         Diagnoses as of 10/08/23 1841   Abdominal pain, unspecified abdominal location       Medical Decision Making    Medical decision making    Course/Differential/MDM: Triage notes were reviewed. Vital signs were reviewed. History and physical exam were performed and the patient was staffed with the attending.     Differential diagnosis includes but not limited to mononucleosis, obstruction, mesentaric adenitis, pancreatitis    Evaluation to include screening laboratory studies, IV pain medication, IV fluids, CT imaging of abdomen and pelvis with IV contrast.     Results:    Labs:   Labs Reviewed   CBC WITH AUTO DIFFERENTIAL - Abnormal       Result Value    WBC 5.0      nRBC 0.0      RBC 4.95      Hemoglobin 14.9      Hematocrit 44.1      MCV 89      MCH 30.1      MCHC 33.8      RDW 12.1      Platelets 163      MPV 12.0 (*)     Neutrophils % 46.5      Immature Granulocytes %, Automated 0.2      Lymphocytes % 43.9      Monocytes % 7.0      Eosinophils % 1.6      Basophils % 0.8      Neutrophils Absolute 2.33      Immature Granulocytes Absolute, Automated 0.01      Lymphocytes Absolute 2.20      Monocytes Absolute 0.35      Eosinophils Absolute 0.08      Basophils Absolute 0.04     COMPREHENSIVE METABOLIC PANEL - Abnormal    Glucose 86      Sodium 139      Potassium 3.7      Chloride 106      Bicarbonate 25      Anion Gap 12      Urea Nitrogen 11      Creatinine 0.79      eGFR  >90      Calcium 9.8      Albumin 4.9      Alkaline Phosphatase 90      Total Protein 7.4      AST 31      Bilirubin, Total 1.9 (*)     ALT 43     URINALYSIS WITH REFLEX MICROSCOPIC AND CULTURE - Abnormal    Color, Urine Straw      Appearance, Urine Clear      Specific Gravity, Urine 1.004 (*)     pH, Urine 7.0      Protein, Urine NEGATIVE      Glucose, Urine NEGATIVE      Blood, Urine NEGATIVE      Ketones, Urine NEGATIVE      Bilirubin, Urine NEGATIVE      Urobilinogen, Urine <2.0      Nitrite, Urine NEGATIVE      Leukocyte Esterase, Urine NEGATIVE     LIPASE - Normal    Lipase 17      Narrative:     Venipuncture immediately after or during the administration of Metamizole may lead to falsely low results. Testing should be performed immediately prior to Metamizole dosing.   MONONUCLEOSIS SCREEN (HETEROPHILE ANTIBODY) - Normal    Mononucleosis Screen Negative     URINALYSIS WITH REFLEX MICROSCOPIC AND CULTURE    Narrative:     The following orders were created for panel order Urinalysis with Reflex Microscopic and Culture.  Procedure                               Abnormality         Status                     ---------                               -----------         ------                     Urinalysis with Reflex M...[[id number]]  Abnormal            Final result               Extra Urine Gray Tube[[id number]]                                                         Please view results for these tests on the individual orders.   EXTRA URINE GRAY TUBE       Imaging:   CT abdomen pelvis w IV contrast   Final Result   Normal appendix. No bowel obstruction.        No hydroureteronephrosis bilaterally.        MACRO:   None.        Signed by: Christ Guerra 10/8/2023 2:24 PM   Dictation workstation:   [id number]          ECG:  not completed    Vitals:    10/08/23 0954 10/08/23 0955   BP:  (!) 134/92   BP Location:  Left arm   Pulse:  81   Resp:  16   Temp: 36.7 °C (98.1 °F)    TempSrc: Oral    SpO2:  99%   Weight: 73  "kg (161 lb)    Height: 1.676 m (5' 6\")        ED Course as of 10/08/23 1841   Sun Oct 08, 2023   1520 Patient remains awake and alert. No change in exam or symptoms with toradol. Discussed results. Discussed plan to discharge and follow with gastroenterology and pcp. She verified she is established with gastroenterology. Has tramadol at home for pain, declined additional medication.  [JG]      ED Course User Index  [JG] LISA Daily         Diagnoses as of 10/08/23 1841   Abdominal pain, unspecified abdominal location       Clinical Impression:   1. Abdominal pain, unspecified abdominal location        Disposition: discharged home    Plan: Presented with ongoing abdominal pain x 2 weeks with no associated symptoms in addition. Seen by pcp earlier in this week and had CT abdomen and pelvis without contrast without acute process noted. Previous abdominal surgeries include hernia x 2, hysterectomy, and cholecystectomy. Was given Rx for tramadol from PCP and has not taken it. Presents with continued pain. Laboratory studies today without acute anemia, renal, electrolyte, or hepatic abnormalities. Urine without evidence of infection. CT with contrast without acute abnormalities. Low suspicion for acute surgical or infectious process. Patient will be discharged home to follow with pcp and established gastroenterologist in 2-3 days. Discussed reasons to present back to the ED. Patient and daughter verbalize understanding and agreement.         Patient case was discussed with Dr. Loco, the attending ED provider, who also saw and evaluated the patient. Patient was counseled about the diagnosis, labs, radiology results, and plan     Discharge Medication List as of 10/8/2023  3:50 PM            Procedure  Procedures     LISA Daily  10/08/23 1841    "

## 2023-10-10 ENCOUNTER — APPOINTMENT (OUTPATIENT)
Dept: PRIMARY CARE | Facility: CLINIC | Age: 43
End: 2023-10-10
Payer: COMMERCIAL

## 2023-10-10 ENCOUNTER — TELEPHONE (OUTPATIENT)
Dept: GASTROENTEROLOGY | Facility: CLINIC | Age: 43
End: 2023-10-10

## 2023-10-10 DIAGNOSIS — R16.0 HEPATOMEGALY: Primary | ICD-10-CM

## 2023-10-10 NOTE — TELEPHONE ENCOUNTER
"Phone - went to ER with abd pain - had CT (\"elongated right lobe\") and labs (normal AST, ALT ) - I rec: U/S with Doppler of hepatic vein, has follow up appt. Next week   "
Last OV 7/3/17; no future OV scheduled.
Is This A New Presentation, Or A Follow-Up?: Skin Lesion
What Type Of Note Output Would You Prefer (Optional)?: Standard Output
Has Your Skin Lesion Been Treated?: not been treated
Statement Selected

## 2023-10-11 ENCOUNTER — HOSPITAL ENCOUNTER (OUTPATIENT)
Dept: RADIOLOGY | Facility: HOSPITAL | Age: 43
Discharge: HOME | End: 2023-10-11
Payer: COMMERCIAL

## 2023-10-11 DIAGNOSIS — R16.0 HEPATOMEGALY: ICD-10-CM

## 2023-10-11 PROCEDURE — 76700 US EXAM ABDOM COMPLETE: CPT | Performed by: RADIOLOGY

## 2023-10-11 PROCEDURE — 93975 VASCULAR STUDY: CPT

## 2023-10-11 PROCEDURE — 76700 US EXAM ABDOM COMPLETE: CPT

## 2023-10-11 PROCEDURE — 93975 VASCULAR STUDY: CPT | Performed by: RADIOLOGY

## 2023-10-13 DIAGNOSIS — M79.642 BILATERAL HAND PAIN: ICD-10-CM

## 2023-10-13 DIAGNOSIS — M79.641 BILATERAL HAND PAIN: ICD-10-CM

## 2023-10-16 ENCOUNTER — OFFICE VISIT (OUTPATIENT)
Dept: SURGERY | Facility: CLINIC | Age: 43
End: 2023-10-16
Payer: COMMERCIAL

## 2023-10-16 VITALS
BODY MASS INDEX: 26.2 KG/M2 | OXYGEN SATURATION: 96 % | HEIGHT: 66 IN | DIASTOLIC BLOOD PRESSURE: 80 MMHG | SYSTOLIC BLOOD PRESSURE: 126 MMHG | HEART RATE: 66 BPM | WEIGHT: 163 LBS

## 2023-10-16 DIAGNOSIS — R10.9 CHRONIC ABDOMINAL PAIN: Primary | ICD-10-CM

## 2023-10-16 DIAGNOSIS — G89.29 CHRONIC ABDOMINAL PAIN: Primary | ICD-10-CM

## 2023-10-16 PROCEDURE — 99215 OFFICE O/P EST HI 40 MIN: CPT | Performed by: SURGERY

## 2023-10-16 PROCEDURE — 1036F TOBACCO NON-USER: CPT | Performed by: SURGERY

## 2023-10-16 ASSESSMENT — ENCOUNTER SYMPTOMS
COUGH: 0
DIARRHEA: 0
HEADACHES: 0
FEVER: 0
NAUSEA: 0
SHORTNESS OF BREATH: 0
APPETITE CHANGE: 1
DIZZINESS: 0
CHILLS: 0
ABDOMINAL PAIN: 1
PALPITATIONS: 0
BLOOD IN STOOL: 0
CONSTIPATION: 0
VOMITING: 0

## 2023-10-16 NOTE — PROGRESS NOTES
GENERAL SURGERY CLINIC NOTE    Ronda Dean   1980   [id number]     History Of Present Illness  Ronda Dean is a 43 y.o. female who presents to the office for evaluation of abdominal pain. For the last few years, she has experienced intermittent but frequent right upper quadrant/right lateral abdominal pain. It is not associated with food and worsens with physical activities/positions such as walking and sitting. Her appetite has slightly decreased. She was evaluated by Dr. Martin in 2021. She underwent EGD earlier this year and is following up with GI soon. She went to the ED 10/8/23 due to her symptoms and RUQ US and CT A/P were largely unremarkable. Abdominal surgeries significant for laparoscopic cholecystectomy 20 years ago, hysterectomy, and laparoscopic lysis of adhesions 2 years ago. She states her symptoms improved temporarily after lysis of adhesions.      Past Medical History  She has a past medical history of Dysuria (05/08/2023), Hyperlipidemia, unspecified (04/14/2016), Neuropathic pain of chest (05/08/2023), Pain of right breast (05/08/2023), Personal history of other diseases of the digestive system (07/07/2020), Urinary urgency (05/08/2023), and Vaginal discharge (05/08/2023).    Surgical History  She has a past surgical history that includes Total abdominal hysterectomy (04/16/2015); Cervical biopsy w/ loop electrode excision (04/16/2015); Cholecystectomy (04/16/2015); Hysterectomy (09/07/2021); Other surgical history (07/08/2020); Other surgical history (07/08/2020); Other surgical history (07/08/2020); Other surgical history (02/16/2021); and Other surgical history (02/16/2021).    Medications  Current Outpatient Medications on File Prior to Visit   Medication Sig Dispense Refill    ALBUTEROL SULFATE INHL Inhale 1-2 puffs. Every 4-6 hours as needed  Albuterol sulfate  (90 Base) mcg/Act inhalation aerosol      estradiol (Estrace) 1 mg tablet Take 1 tablet (1 mg) by mouth once  daily.      famotidine (Pepcid) 40 mg tablet Take 1 tablet (40 mg) by mouth once daily at bedtime.      ibuprofen 600 mg tablet Take 1 tablet (600 mg) by mouth every 8 hours if needed. W/ food and water      Lactobacillus acidophilus (PROBIOTIC ACIDOPHILUS ORAL) Probiotic Acidophilus oral capsules      metFORMIN XR (Glucophage-XR) 500 mg 24 hr tablet TAKE 1 TABLET BY MOUTH DAILY WITH BREAKFAST FOR 15 DAYS, THEN 2 TABLETS DAILY WITH BREAKFAST.      omeprazole (PriLOSEC) 40 mg DR capsule Take 1 capsule (40 mg) by mouth once daily.      [] traMADol (Ultram) 50 mg tablet Take 1 tablet (50 mg) by mouth every 6 hours if needed for severe pain (7 - 10) for up to 7 days. 15 tablet 0    VITAMIN B COMPLEX ORAL Vitamin B Complex oral tablets       No current facility-administered medications on file prior to visit.       Allergies  Pregabalin, Bupropion, Gabapentin, Hydrochlorothiazide, Naltrexone-bupropion, Ondansetron hcl, Promethazine, Trazodone, and Penicillins     Social History  She reports that she has never smoked. She has never used smokeless tobacco. No history on file for alcohol use and drug use.    Family History  Family History   Problem Relation Name Age of Onset    Diabetes Father      Hypertension Father      Diabetes Father's Sister      Stomach cancer Father's Sister      Diabetes Paternal Grandmother      Hypertension Paternal Grandmother      Coronary artery disease Paternal Grandmother          MI>60s    Diabetes Paternal Cousin     Paternal grandfather - prostate cancer     Review of Systems   Constitutional:  Positive for appetite change. Negative for chills and fever.   Respiratory:  Negative for cough and shortness of breath.    Cardiovascular:  Negative for chest pain and palpitations.   Gastrointestinal:  Positive for abdominal pain. Negative for blood in stool, constipation, diarrhea, nausea and vomiting.   Neurological:  Negative for dizziness and headaches.   All other systems reviewed and  "are negative.      Last Recorded Vitals  Blood pressure 126/80, pulse 66, height 1.676 m (5' 6\"), weight 73.9 kg (163 lb), SpO2 96 %.     Physical Exam  Constitutional:       General: She is not in acute distress.     Appearance: Normal appearance. She is not ill-appearing.   HENT:      Head: Normocephalic and atraumatic.   Cardiovascular:      Rate and Rhythm: Normal rate and regular rhythm.   Pulmonary:      Effort: Pulmonary effort is normal. No respiratory distress.      Breath sounds: Normal breath sounds.   Abdominal:      General: There is no distension.      Palpations: Abdomen is soft.      Tenderness: There is abdominal tenderness.      Comments: Mild to moderate tenderness to very light palpation in the RUQ/epigastric regions. Well healed incisions   Musculoskeletal:         General: No swelling.   Skin:     General: Skin is warm and dry.   Neurological:      General: No focal deficit present.      Mental Status: She is alert and oriented to person, place, and time. Mental status is at baseline.   Psychiatric:         Mood and Affect: Mood normal.         Behavior: Behavior normal.          Relevant Results  US abdomen complete    Result Date: 10/11/2023  Interpreted By:  Sebas Oconnor, STUDY: US ABDOMEN COMPLETE; [facility] US ABDOMINAL/PELVIC DUPLEX COMPLETE 10/11/2023 8:31 am   INDICATION: 42 y/o   F with  Signs/Symptoms:hepatomegaly, RUQ pain - PLEASE INCLUDE DOPPLER STUDY OF HEPATIC VEIN; Signs/Symptoms:hepatomegaly.   COMPARISON:   CT scan from 10/08/2023 and previous ultrasound from 02/18/2023.   ACCESSION NUMBER(S): [id number]; [id number]   ORDERING CLINICIAN: ROMI LUCIANO   TECHNIQUE: Ultrasound of the abdomen was performed using grayscale imaging, color Doppler, and spectral Doppler.   FINDINGS: LIVER: Cranial caudal length:  17.9cm, within normal limits  for age.within normal limits  for age Echogenicity:  Normal. Mass: None.   GALLBLADDER: Surgically absent.   BILE DUCTS: No intrahepatic " biliary ductal dilatation. Common bile duct measured 3 mm in diameter. This is within the limits of normal.   PANCREAS: Pancreatic head and body were well seen and were unremarkable sonographically.  Pancreatic tail was obscured by bowel gas.   PERITONEAL FLUID: No ascites.   SPLEEN: Measures  11.2cm in craniocaudal dimension, which is within normal limits.  No focal splenic lesion identified. Calcified splenic granuloma.   RIGHT KIDNEY: The right kidney measured  10.4 cm in length. It  was sonographically normal for size and echogenicity. There was no shadowing stone, hydronephrosis, or perinephric collection.   LEFT KIDNEY: The left  kidney measured  9.5 cm in length. It was sonographically normal for size and echogenicity. There was no shadowing stone, hydronephrosis, or perinephric collection.   HEPATIC ARTERIES: The following demonstrate patency and appropriate direction of flow: Main hepatic artery RI  0.78, mildly elevated; Right hepatic artery RI  0.57, within the limits of normal; Left hepatic artery RI  0.67, within the limits of normal;   SPLENIC VEIN: Splenic vein  velocity was  40.3 cm/s.   HEPATIC AND PORTAL VENOUS BRANCHES: Right, middle and left hepatic venous branches demonstrate  triphasic waveform. Main portal vein measured  12 mm in diameter. Main portal vein flow was at 27.2 cm/sec.  Main, Left, and Right portal veins showed normal, monophasic wave forms in appropriate direction. No recanalized periumbilical vein or splenorenal shunt identified.       Previous cholecystectomy.   Mildly elevated main hepatic artery resistive index. The Doppler portion of this exam was otherwise within the limits of normal.   Remainder of the exam was negative.   MACRO: None   Signed by: Sebas Oconnor 10/11/2023 8:39 AM Dictation workstation:   [id number]    Vascular US abdomen/pelvis duplex complete    Result Date: 10/11/2023  Interpreted By:  Sebas Oconnor, STUDY: [facility]BD[facility]  DUPLEX COMPLETE 10/11/2023 8:31 am   INDICATION: 44 y/o   F with  Signs/Symptoms:hepatomegaly, RUQ pain - PLEASE INCLUDE DOPPLER STUDY OF HEPATIC VEIN; Signs/Symptoms:hepatomegaly.   COMPARISON:   CT scan from 10/08/2023 and previous ultrasound from 02/18/2023.   ACCESSION NUMBER(S): [id number]; [id number]   ORDERING CLINICIAN: ROMI LUCIANO   TECHNIQUE: Ultrasound of the abdomen was performed using grayscale imaging, color Doppler, and spectral Doppler.   FINDINGS: LIVER: Cranial caudal length:  17.9cm, within normal limits  for age.within normal limits  for age Echogenicity:  Normal. Mass: None.   GALLBLADDER: Surgically absent.   BILE DUCTS: No intrahepatic biliary ductal dilatation. Common bile duct measured 3 mm in diameter. This is within the limits of normal.   PANCREAS: Pancreatic head and body were well seen and were unremarkable sonographically.  Pancreatic tail was obscured by bowel gas.   PERITONEAL FLUID: No ascites.   SPLEEN: Measures  11.2cm in craniocaudal dimension, which is within normal limits.  No focal splenic lesion identified. Calcified splenic granuloma.   RIGHT KIDNEY: The right kidney measured  10.4 cm in length. It  was sonographically normal for size and echogenicity. There was no shadowing stone, hydronephrosis, or perinephric collection.   LEFT KIDNEY: The left  kidney measured  9.5 cm in length. It was sonographically normal for size and echogenicity. There was no shadowing stone, hydronephrosis, or perinephric collection.   HEPATIC ARTERIES: The following demonstrate patency and appropriate direction of flow: Main hepatic artery RI  0.78, mildly elevated; Right hepatic artery RI  0.57, within the limits of normal; Left hepatic artery RI  0.67, within the limits of normal;   SPLENIC VEIN: Splenic vein  velocity was  40.3 cm/s.   HEPATIC AND PORTAL VENOUS BRANCHES: Right, middle and left hepatic venous branches demonstrate  triphasic waveform. Main portal vein measured  12 mm in  diameter. Main portal vein flow was at 27.2 cm/sec.  Main, Left, and Right portal veins showed normal, monophasic wave forms in appropriate direction. No recanalized periumbilical vein or splenorenal shunt identified.       Previous cholecystectomy.   Mildly elevated main hepatic artery resistive index. The Doppler portion of this exam was otherwise within the limits of normal.   Remainder of the exam was negative.   MACRO: None   Signed by: Sebas Oconnor 10/11/2023 8:39 AM Dictation workstation:   [id number]    CT abdomen pelvis w IV contrast    Result Date: 10/8/2023  Interpreted By:  Christ Guerra, STUDY: CT ABDOMEN PELVIS W IV CONTRAST;  10/8/2023 2:01 pm   INDICATION: Signs/Symptoms:right sided abdominal pain and fatigue.   COMPARISON: 10/05/2023.   ACCESSION NUMBER(S): [id number]   ORDERING CLINICIAN: ANDI FREY   TECHNIQUE: Contiguous axial images were obtained through the abdomen and pelvis after the administration of  75 mL Omnipaque 350 intravenous contrast. Coronal and sagittal reformations were made.   FINDINGS: LOWER CHEST: Minimal dependent atelectasis is present in the lung bases.   ABDOMEN:   LIVER: There is elongated configuration of the right hepatic lobe measuring 20 cm in craniocaudal dimension. No focal hepatic lesion is seen.   BILE DUCTS: Nondilated.   GALLBLADDER: Cholecystectomy changes again seen.   PANCREAS: Within normal limits.   SPLEEN: Within normal limits.   ADRENAL GLANDS: Within normal limits.   KIDNEYS AND URETERS: The kidneys enhance symmetrically without focal lesion.  No hydroureteronephrosis bilaterally.   Urinary bladder is partially distended but otherwise unremarkable. 2 adjacent small calcifications in the left pelvis posteriorly are unchanged from prior and likely represent small phleboliths. Right pelvic punctate phleboliths also again seen.   VESSELS: There is no aneurysmal dilatation of the abdominal aorta. The IVC is within normal limits.   BOWEL: No bowel  obstruction. Appendix is normal.   No focal diverticular disease. Segmental mild wall prominence of the colon more likely is exaggerated by underdistention. No focal pericolonic inflammation.   PERITONEUM/RETROPERITONEUM/LYMPH NODES: No ascites or free air, no fluid collection.   There has been a hysterectomy.   No retroperitoneal fluid collection or lymphadenopathy.   ABDOMINAL WALL: Mild diastasis rectus is present.   BONE AND SOFT TISSUE: Bones are intact.       Normal appendix. No bowel obstruction.   No hydroureteronephrosis bilaterally.   MACRO: None.   Signed by: Christ Guerra 10/8/2023 2:24 PM Dictation workstation:   [id number]    CT abdomen pelvis wo IV contrast    Result Date: 10/5/2023  STUDY: CT Abdomen and Pelvis without IV Contrast; 10/05/2023 11:46 AM. INDICATION: Generalized abdominal pain. COMPARISON: US abdomen 2/18/2023.  CT AP 10/21/2021. ACCESSION NUMBER(S): [id number] ORDERING CLINICIAN: GEOFFREY CONTRERAS TECHNIQUE: CT of the abdomen and pelvis was performed.  Contiguous axial images were obtained at 3 mm slice thickness through the abdomen and pelvis. Coronal and sagittal reconstructions at 3 mm slice thickness were performed. No intravenous contrast was administered.  Automated mA/kV exposure control was utilized and patient examination was performed in strict accordance with principles of ALARA. FINDINGS: Please note that the evaluation of vessels, lymph nodes and organs is limited without intravenous contrast.  LOWER CHEST: No cardiomegaly.  No pericardial effusion.  Lung bases are clear.  ABDOMEN:  LIVER: No hepatomegaly.  Smooth surface contour.  Normal attenuation.  BILE DUCTS: No intrahepatic or extrahepatic biliary ductal dilatation.  GALLBLADDER: The gallbladder is surgically absent. STOMACH: No abnormalities identified.  PANCREAS: No masses or ductal dilatation.  SPLEEN: No splenomegaly or focal splenic lesion. Small calcification noted within the central spleen.  ADRENAL  GLANDS: No thickening or nodules.  KIDNEYS AND URETERS: Kidneys are normal in size and location.  2 mm calculus observed within the interpolar left kidney, without obstruction. No hydronephrosis.  PELVIS:  BLADDER: No abnormalities identified.  REPRODUCTIVE ORGANS: No abnormalities identified in the adnexa. Ovaries are not seen. Uterus surgically absent.  BOWEL: No abnormalities identified. Appendix within normal limits.  VESSELS: No abnormalities identified.  Abdominal aorta is normal in caliber.  PERITONEUM/RETROPERITONEUM/LYMPH NODES: No free fluid.  No pneumoperitoneum. No lymphadenopathy.  ABDOMINAL WALL: No abnormalities identified. SOFT TISSUES: No abnormalities identified.  BONES: No acute fracture or aggressive osseous lesion.    Unremarkable exam except for noncalcified 2 mm left renal stone and splenic granuloma. Hysterectomy and cholecystectomy. Normal appendix. Signed by Ambrose Han MD      Assessment and Plan  43 y.o. female with chronic intermittent abdominal pain of unclear etiology. Based on her history and examination, with a lack of significant intraabdominal findings on imaging, this may be in part musculoskeletal in origin. I asked her to take over the counter medications as needed. I will refer the patient to Physical Therapy to address her symptoms and methods to decrease the severity, if certain positions or activities worsen them. I do not recommend any surgical intervention at this time. She can follow up with GI and Hepatology for her fatty liver disease and evaluation for other potential sources of pain. Follow up with me on an as needed basis. She expressed her understanding and all questions were answered.    Olga Ji MD, FACS  General Surgery

## 2023-10-17 ENCOUNTER — APPOINTMENT (OUTPATIENT)
Dept: UROLOGY | Facility: CLINIC | Age: 43
End: 2023-10-17
Payer: COMMERCIAL

## 2023-10-17 ENCOUNTER — OFFICE VISIT (OUTPATIENT)
Dept: GASTROENTEROLOGY | Facility: CLINIC | Age: 43
End: 2023-10-17
Payer: COMMERCIAL

## 2023-10-17 VITALS — OXYGEN SATURATION: 99 % | HEART RATE: 82 BPM | HEIGHT: 66 IN | BODY MASS INDEX: 26.2 KG/M2 | WEIGHT: 163 LBS

## 2023-10-17 DIAGNOSIS — R10.11 RIGHT UPPER QUADRANT ABDOMINAL PAIN: Primary | ICD-10-CM

## 2023-10-17 PROCEDURE — 99213 OFFICE O/P EST LOW 20 MIN: CPT | Performed by: INTERNAL MEDICINE

## 2023-10-17 PROCEDURE — 1036F TOBACCO NON-USER: CPT | Performed by: INTERNAL MEDICINE

## 2023-10-17 NOTE — PROGRESS NOTES
"Subjective     History of Present Illness:   Ronda Dean is a 43 y.o. female with PMHx of RUQ pain who presents to GI clinic for follow up.   Has daily RUQ pain radiating to back.   Constant, not related to meals, not related to BM's.  \"Hurts to have a BM\".  Has BM daily, not constipated.   Pain is not positional, but \"hurts when (she is) sitting\".  No food associations.       Review of Systems  Review of Systems    Allergies  Allergies   Allergen Reactions    Pregabalin Other     paranoid    Bupropion Headache     HAs    Gabapentin Other     fatigue    Hydrochlorothiazide Other     N    Naltrexone-Bupropion Headache     headaches    Ondansetron Hcl Other     worsening N    Promethazine Other     nausea / vomiting    Trazodone Headache    Penicillins Rash       Medications  Current Outpatient Medications   Medication Instructions    ALBUTEROL SULFATE INHL 1-2 puffs, inhalation, Every 4-6 hours as needed<BR>Albuterol sulfate  (90 Base) mcg/Act inhalation aerosol    estradiol (ESTRACE) 1 mg, oral, Daily    famotidine (Pepcid) 40 mg tablet 1 tablet, oral, Nightly    ibuprofen 600 mg tablet 1 tablet, oral, Every 8 hours PRN, W/ food and water    Lactobacillus acidophilus (PROBIOTIC ACIDOPHILUS ORAL) Probiotic Acidophilus oral capsules    metFORMIN XR (Glucophage-XR) 500 mg 24 hr tablet TAKE 1 TABLET BY MOUTH DAILY WITH BREAKFAST FOR 15 DAYS, THEN 2 TABLETS DAILY WITH BREAKFAST.    omeprazole (PriLOSEC) 40 mg DR capsule 1 capsule, oral, Daily    VITAMIN B COMPLEX ORAL Vitamin B Complex oral tablets        Objective   Visit Vitals  Pulse 82    108/72  Physical Exam  Looks well  Lungs clear  CV rrr, no mrg  Abd - not distended - tender liver edge in RUQ       Lab Results   Component Value Date    WBC 5.0 10/08/2023    WBC 6.1 08/07/2023    WBC 5.2 04/02/2022    HGB 14.9 10/08/2023    HGB 13.9 08/07/2023    HGB 13.8 04/02/2022    HCT 44.1 10/08/2023    HCT 41.9 08/07/2023    HCT 42.9 04/02/2022     " 10/08/2023     08/07/2023     (L) 04/02/2022     Lab Results   Component Value Date     10/08/2023     08/07/2023     08/05/2023    K 3.7 10/08/2023    K 3.9 08/07/2023    K 4.2 08/05/2023     10/08/2023     08/07/2023     (H) 08/05/2023    CO2 25 10/08/2023    CO2 27 08/07/2023    CO2 25 08/05/2023    BUN 11 10/08/2023    BUN 12 08/07/2023    BUN 15 08/05/2023    CREATININE 0.79 10/08/2023    CREATININE 0.83 08/07/2023    CREATININE 0.76 08/05/2023    CALCIUM 9.8 10/08/2023    CALCIUM 9.8 08/07/2023    CALCIUM 9.5 08/05/2023    PROT 7.4 10/08/2023    PROT 7.4 09/19/2023    PROT 7.0 08/07/2023    BILITOT 1.9 (H) 10/08/2023    BILITOT 0.9 09/19/2023    BILITOT 1.4 (H) 08/07/2023    ALKPHOS 90 10/08/2023    ALKPHOS 85 09/19/2023    ALKPHOS 84 08/07/2023    ALT 43 10/08/2023    ALT 24 09/19/2023    ALT 67 (H) 08/07/2023    AST 31 10/08/2023    AST 20 09/19/2023    AST 31 08/07/2023    GLUCOSE 86 10/08/2023    GLUCOSE 79 08/07/2023    GLUCOSE 77 08/05/2023    CHOL 249 (H) 08/05/2023    CHOL 223 (H) 02/11/2023    CHOL 201 (H) 07/02/2022    LDLF 161 (H) 08/05/2023    LDLF 146 (H) 02/11/2023    LDLF 115 (H) 07/02/2022    CHHDL 4.5 08/05/2023    CHHDL 4.4 02/11/2023    CHHDL 3.8 07/02/2022             Ronda Dean is a 43 y.o. female for follow up of RUQ pain .   Etiology unclear.    Has appointment with Dr. Hardwick in December.   I will inquire re: earlier appt.       Wagner Lauren MD

## 2023-10-19 ENCOUNTER — OFFICE VISIT (OUTPATIENT)
Dept: ORTHOPEDIC SURGERY | Facility: CLINIC | Age: 43
End: 2023-10-19
Payer: COMMERCIAL

## 2023-10-19 ENCOUNTER — HOSPITAL ENCOUNTER (OUTPATIENT)
Dept: RADIOLOGY | Facility: HOSPITAL | Age: 43
Discharge: HOME | End: 2023-10-19
Payer: COMMERCIAL

## 2023-10-19 VITALS — HEIGHT: 66 IN | BODY MASS INDEX: 25.55 KG/M2 | WEIGHT: 159 LBS

## 2023-10-19 DIAGNOSIS — M79.642 BILATERAL HAND PAIN: ICD-10-CM

## 2023-10-19 DIAGNOSIS — M79.641 BILATERAL HAND PAIN: ICD-10-CM

## 2023-10-19 DIAGNOSIS — G56.03 CARPAL TUNNEL SYNDROME, BILATERAL: ICD-10-CM

## 2023-10-19 PROCEDURE — 99214 OFFICE O/P EST MOD 30 MIN: CPT | Performed by: ORTHOPAEDIC SURGERY

## 2023-10-19 PROCEDURE — 1036F TOBACCO NON-USER: CPT | Performed by: ORTHOPAEDIC SURGERY

## 2023-10-19 PROCEDURE — 73130 X-RAY EXAM OF HAND: CPT | Mod: LT,FY

## 2023-10-19 PROCEDURE — 73130 X-RAY EXAM OF HAND: CPT | Mod: RT,FY

## 2023-10-19 PROCEDURE — 73130 X-RAY EXAM OF HAND: CPT | Mod: LEFT SIDE | Performed by: RADIOLOGY

## 2023-10-19 PROCEDURE — 73130 X-RAY EXAM OF HAND: CPT | Mod: RIGHT SIDE | Performed by: RADIOLOGY

## 2023-10-19 NOTE — PROGRESS NOTES
43 y.o. female presents today for evaluation of bilateral hand numbness, tingling, weakness and pain. The patient reports symptoms for months, getting worse.  Pain is controlled.  Reports no previous surgeries, injections or trauma to the area.  Reports pain worse with use, better at rest.  Pain numb and tingly, wakes them from sleep.   Worse driving a car and talking on a phone.  Also complains of right dorsal hand pain over 2nd CMC.      Review of Systems    Constitutional: no fever, no chills, not feeling tired, no recent weight gain and no recent weight loss.   ENT: no nosebleeds.   Cardiovascular: no chest pain.   Respiratory: no shortness of breath and no cough.   Gastrointestinal: no abdominal pain, no nausea, no vomiting and no diarrhea.   Musculoskeletal: per HPI  Integumentary: no rashes and no skin wound.   Neurological: no headache.   Psychiatric: no depression and no sleep disturbances.   Endocrine: no muscle weakness and no muscle cramps.   Hematologic/Lymphatic: no swollen glands and no tendency for easy bruising.     All other systems have been reviewed and are negative for complaint    Patient's past medical history, past surgical history, allergies, and medications have been reviewed unless otherwise noted in the chart.     Carpal Tunnel Exam  Inspection:  no evidence of infection, no edema, no erythema, no ecchymosis, Palpation:  compartments are soft, no pain with palpation, Range of Motion:  full wrist and finger range of motion, Stability:  no wrist instability detected, Strength:  5/5 APB and intrinsics, Skin:  intact, Vascular:  capillary refill <2 seconds distally, Sensation:  decreased in the median nerve distribution, Test:  positive Tinel's at the Carpal Tunnel, positive Direct Carpal Tunnel Compression Test       Mass  Location:  Right dorsal 2nd CMC Specific:  CMC boss Inspection (mass size):  5 mm x 5 mm Palpation:  firm, nonmobile, noncompressible, Range of Motion:  full wrist and  finger motion, Stability:  no instability noted, Strength:  5/5 hand and wrist, Skin:  intact, Vascular:  capillary refill <2 seconds distally, Sensation:  sensation intact to light touch distally.       Constitutional   General appearance: Alert and in no acute distress. Well developed, well nourished.    Eyes   External Eye, Conjunctiva and lids: Normal external exam - pupils were equal in size, round, reactive to light (PERRL) with normal accommodation and extraocular movements intact (EOMI).   Ears, Nose, Mouth, and Throat   Hearing: Normal.   Neck   Neck: No neck mass was observed. Supple.   Pulmonary   Respiratory effort: No respiratory distress.   Cardiovascular   Examination of extremities: No peripheral edema.   Abdomen   Abdomen: Soft nontender; no abdominal mass palpated.. No rebound, rigidity or guarding.    Skin   Skin and subcutaneous tissue: Normal skin color and pigmentation, normal skin turgor, and no rash.   Neurologic   Sensation: Normal.   Psychiatric   Judgment and insight: Intact.   Orientation to person, place, and time: Alert and oriented x 3.       Mood and affect: Normal.     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 After thorough discussion, the patient has elected to proceed with conservative management. All questions were answered to the patients satisfaction who seems satisfied with the plan.      EMG  Night splint  FU after EMG - no XR

## 2023-10-19 NOTE — PROGRESS NOTES
Bilateral hand pain for years, no injury.   Right is worse - Right hand dominant.  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   Wears wrist braces at work, no relief.   Takes Ibuprofen PRN - no relief.   Can't take Tylenol due to liver issues.   No prior surgeries.

## 2023-11-07 ENCOUNTER — OFFICE VISIT (OUTPATIENT)
Dept: PRIMARY CARE | Facility: CLINIC | Age: 43
End: 2023-11-07
Payer: COMMERCIAL

## 2023-11-07 VITALS
TEMPERATURE: 97.3 F | WEIGHT: 167 LBS | SYSTOLIC BLOOD PRESSURE: 116 MMHG | DIASTOLIC BLOOD PRESSURE: 72 MMHG | OXYGEN SATURATION: 100 % | HEART RATE: 71 BPM | BODY MASS INDEX: 26.95 KG/M2

## 2023-11-07 DIAGNOSIS — E78.2 MIXED HYPERLIPIDEMIA: ICD-10-CM

## 2023-11-07 DIAGNOSIS — G89.29 CHRONIC ABDOMINAL PAIN: Primary | ICD-10-CM

## 2023-11-07 DIAGNOSIS — R10.9 CHRONIC ABDOMINAL PAIN: Primary | ICD-10-CM

## 2023-11-07 PROCEDURE — 1036F TOBACCO NON-USER: CPT | Performed by: FAMILY MEDICINE

## 2023-11-07 PROCEDURE — 99214 OFFICE O/P EST MOD 30 MIN: CPT | Performed by: FAMILY MEDICINE

## 2023-11-07 ASSESSMENT — ENCOUNTER SYMPTOMS
ARTHRALGIAS: 0
HEADACHES: 0
SLEEP DISTURBANCE: 0
POLYDIPSIA: 0
MYALGIAS: 0
DYSURIA: 0
FATIGUE: 0
PALPITATIONS: 0
ROS GI COMMENTS: AS ABOVE
SHORTNESS OF BREATH: 0
DIFFICULTY URINATING: 0
POLYPHAGIA: 0
DIZZINESS: 0

## 2023-11-07 NOTE — PATIENT INSTRUCTIONS
Recommend a predominant whole foods plant based diet.  Cut back on meat, dairy, salt and oils. Increase fiber in your diet.  Decrease alcohol as much as possible if you drink. Recommend regular exercise most days of the week.    Obtain your blood work    You were referred for gastric emptying study    Go to the ER if any of your symptoms are significantly worsening.     Follow up with your specialists as scheduled    Return in 6 months, sooner if needed

## 2023-11-08 NOTE — PROGRESS NOTES
"Subjective   Patient ID: Ronda Dean is a 43 y.o. female who presents for Hyperlipidemia (Recheck.).  Did not have blood work    Hyperlipidemia  Pertinent negatives include no chest pain, myalgias or shortness of breath.      LFTs/right upper quadrant pain: Chronic.  Recently saw GI who recommended patient see hepatology.  Appointment scheduled for December.  Pain chronic and constant.  Worse after eating.  Feels like food \"sits in his stomach\".  Occasionally nausea.  Currently having constipation.  EGD unremarkable several years ago.  PPIs have not helped.  Recent autoimmune work-up negative.  Originally diagnosed with fatty liver but imaging did not show signs of this.    Lipids: Due for recheck    Review of Systems   Constitutional:  Negative for fatigue.   Eyes:  Negative for visual disturbance.   Respiratory:  Negative for shortness of breath.    Cardiovascular:  Negative for chest pain and palpitations.   Gastrointestinal:         As above   Endocrine: Negative for polydipsia, polyphagia and polyuria.   Genitourinary:  Negative for difficulty urinating and dysuria.   Musculoskeletal:  Negative for arthralgias and myalgias.   Skin:  Negative for rash.   Neurological:  Negative for dizziness and headaches.   Psychiatric/Behavioral:  Negative for sleep disturbance.        Objective   /72   Pulse 71   Temp 36.3 °C (97.3 °F)   Wt 75.8 kg (167 lb)   SpO2 100%   BMI 26.95 kg/m²     Physical Exam  Vitals and nursing note reviewed.   Constitutional:       General: She is not in acute distress.     Appearance: Normal appearance. She is not toxic-appearing.   HENT:      Head: Normocephalic.   Eyes:      General: No scleral icterus.     Pupils: Pupils are equal, round, and reactive to light.   Cardiovascular:      Rate and Rhythm: Normal rate and regular rhythm.      Pulses: Normal pulses.      Heart sounds: No murmur heard.  Pulmonary:      Effort: Pulmonary effort is normal. No respiratory distress.      " Breath sounds: Normal breath sounds.   Abdominal:      General: Bowel sounds are normal.      Palpations: Abdomen is soft.      Tenderness: There is abdominal tenderness. There is no guarding.   Musculoskeletal:      Cervical back: Neck supple.      Right lower leg: No edema.      Left lower leg: No edema.   Lymphadenopathy:      Cervical: No cervical adenopathy.   Skin:     General: Skin is warm.   Neurological:      General: No focal deficit present.      Mental Status: She is alert.      Cranial Nerves: No cranial nerve deficit.   Psychiatric:         Mood and Affect: Mood normal.         Assessment/Plan   Problem List Items Addressed This Visit             ICD-10-CM    Mixed hyperlipidemia E78.2    Chronic abdominal pain - Primary R10.9, G89.29    Relevant Orders    NM gastric emptying solid   Reviewed prior blood work, imaging.  Reviewed GI consult.  Referred for gastric emptying study.

## 2023-11-10 ENCOUNTER — TELEPHONE (OUTPATIENT)
Dept: PRIMARY CARE | Facility: CLINIC | Age: 43
End: 2023-11-10
Payer: COMMERCIAL

## 2023-11-10 DIAGNOSIS — E78.5 HYPERLIPIDEMIA, UNSPECIFIED HYPERLIPIDEMIA TYPE: Primary | ICD-10-CM

## 2023-11-10 NOTE — TELEPHONE ENCOUNTER
Pt was seen on 11/7 and IEL told pt to get labs done but no order was placed. Pt needs order placed and a call when this is done.

## 2023-11-11 ENCOUNTER — LAB (OUTPATIENT)
Dept: LAB | Facility: LAB | Age: 43
End: 2023-11-11
Payer: COMMERCIAL

## 2023-11-11 DIAGNOSIS — E78.5 HYPERLIPIDEMIA, UNSPECIFIED HYPERLIPIDEMIA TYPE: ICD-10-CM

## 2023-11-11 LAB
CHOLEST SERPL-MCNC: 298 MG/DL (ref 0–199)
CHOLESTEROL/HDL RATIO: 5.3
HDLC SERPL-MCNC: 55.8 MG/DL
LDLC SERPL CALC-MCNC: 196 MG/DL
NON HDL CHOLESTEROL: 242 MG/DL (ref 0–149)
TRIGL SERPL-MCNC: 232 MG/DL (ref 0–149)
VLDL: 46 MG/DL (ref 0–40)

## 2023-11-11 PROCEDURE — 80061 LIPID PANEL: CPT

## 2023-11-11 PROCEDURE — 36415 COLL VENOUS BLD VENIPUNCTURE: CPT

## 2023-11-14 ENCOUNTER — TELEPHONE (OUTPATIENT)
Dept: PRIMARY CARE | Facility: CLINIC | Age: 43
End: 2023-11-14
Payer: COMMERCIAL

## 2023-11-14 DIAGNOSIS — E78.5 HYPERLIPIDEMIA, UNSPECIFIED HYPERLIPIDEMIA TYPE: Primary | ICD-10-CM

## 2023-11-14 NOTE — TELEPHONE ENCOUNTER
Pt called in asking for results on lipid panel that was drawn, pt doesn't know if she needs to be on medication d/t results. Please advise and call pt

## 2023-11-17 ENCOUNTER — OFFICE VISIT (OUTPATIENT)
Dept: UROLOGY | Facility: CLINIC | Age: 43
End: 2023-11-17
Payer: COMMERCIAL

## 2023-11-17 ENCOUNTER — APPOINTMENT (OUTPATIENT)
Dept: UROLOGY | Facility: CLINIC | Age: 43
End: 2023-11-17
Payer: COMMERCIAL

## 2023-11-17 DIAGNOSIS — N39.0 LOWER URINARY TRACT INFECTIOUS DISEASE: Primary | ICD-10-CM

## 2023-11-17 DIAGNOSIS — N32.81 OAB (OVERACTIVE BLADDER): ICD-10-CM

## 2023-11-17 DIAGNOSIS — N95.1 VASOMOTOR SYMPTOMS DUE TO MENOPAUSE: ICD-10-CM

## 2023-11-17 DIAGNOSIS — N39.3 SUI (STRESS URINARY INCONTINENCE, FEMALE): ICD-10-CM

## 2023-11-17 PROCEDURE — 99214 OFFICE O/P EST MOD 30 MIN: CPT | Performed by: STUDENT IN AN ORGANIZED HEALTH CARE EDUCATION/TRAINING PROGRAM

## 2023-11-17 PROCEDURE — 1036F TOBACCO NON-USER: CPT | Performed by: STUDENT IN AN ORGANIZED HEALTH CARE EDUCATION/TRAINING PROGRAM

## 2023-11-17 RX ORDER — CELECOXIB 50 MG/1
400 CAPSULE ORAL ONCE
Status: CANCELLED | OUTPATIENT
Start: 2023-11-17 | End: 2023-11-17

## 2023-11-17 RX ORDER — ACETAMINOPHEN 325 MG/1
975 TABLET ORAL ONCE
Status: CANCELLED | OUTPATIENT
Start: 2023-11-17 | End: 2023-11-17

## 2023-11-17 NOTE — PROGRESS NOTES
CHIEF COMPLAINT:  FUV           HISTORY OF PRESENT ILLNESS:  This is a 43 y.o. female, who presents with a follow up visit. Patient is doing well. Patient has decided to have the bladder sling surgery for her stress incontinence bladder symptoms. Patient reports she canceled her last two appointments due to being diagnosed with Gout. Patient reports she cannot tolerate the oral estrogen due to getting bad headaches. Patient states she had a partial hysterectomy and experienced complications so she had a second surgery and had her ovaries removed about 12 years ago. Patient is a non-smoker. No history of blood clots. She does complain of having hot flashes. Patient tried Myrbetriq for overactive bladder symptoms. Patient wants to have her surgery before the end of the year for insurance purposes.                HISTORY OF PRESENT ILLNESS:           Past Medical History  She has a past medical history of Dysuria (05/08/2023), Hyperlipidemia, unspecified (04/14/2016), Neuropathic pain of chest (05/08/2023), Pain of right breast (05/08/2023), Personal history of other diseases of the digestive system (07/07/2020), Urinary urgency (05/08/2023), and Vaginal discharge (05/08/2023).    Surgical History  She has a past surgical history that includes Total abdominal hysterectomy (04/16/2015); Cervical biopsy w/ loop electrode excision (04/16/2015); Cholecystectomy (04/16/2015); Hysterectomy (09/07/2021); Other surgical history (07/08/2020); Other surgical history (07/08/2020); Other surgical history (07/08/2020); Other surgical history (02/16/2021); and Other surgical history (02/16/2021).     Social History  She reports that she has never smoked. She has never used smokeless tobacco. No history on file for alcohol use and drug use.    Family History  Family History   Problem Relation Name Age of Onset    Diabetes Father      Hypertension Father      Diabetes Father's Sister      Stomach cancer Father's Sister      Diabetes  "Paternal Grandmother      Hypertension Paternal Grandmother      Coronary artery disease Paternal Grandmother          MI>60s    Diabetes Paternal Cousin          Allergies  Pregabalin, Bupropion, Gabapentin, Hydrochlorothiazide, Naltrexone-bupropion, Ondansetron hcl, Promethazine, Trazodone, and Penicillins      A comprehensive 10+ review of systems was negative except for: see hpi                          PHYSICAL EXAMINATION:  BP Readings from Last 3 Encounters:   11/07/23 116/72   10/16/23 126/80   10/08/23 (!) 134/92      Wt Readings from Last 3 Encounters:   11/07/23 75.8 kg (167 lb)   10/19/23 72.1 kg (159 lb)   10/17/23 73.9 kg (163 lb)      BMI: Estimated body mass index is 26.95 kg/m² as calculated from the following:    Height as of 10/19/23: 1.676 m (5' 6\").    Weight as of 11/7/23: 75.8 kg (167 lb).  BSA: Estimated body surface area is 1.88 meters squared as calculated from the following:    Height as of 10/19/23: 1.676 m (5' 6\").    Weight as of 11/7/23: 75.8 kg (167 lb).  HEENT: Normocephalic, atraumatic, PER EOMI, nonicteric, trachea normal, thyroid normal, oropharynx normal.  CARDIAC: regular rate & rhythm, S1 & S2 normal.  No heaves, thrills, gallops or murmurs.  LUNGS: Clear to auscultation, no spinal or CV tenderness.  EXTREMITIES: No evidence of cyanosis, clubbing or edema.                   Assessment:    43-year-old with dyspareunia, mixed incontinence, hypertonic pelvic floor      HRT:  Will prescribe estradiol patches      Stress incontinence:        Wants to proceed with sling     We discussed the sling procedure in depth with her there is a long-term success rate of 70-80% complete continence and up to 90% significant improvement up to 10 years after surgery, though the sling is meant to last a lifetime, there is a <5% risk of subsequent surgery, either revision or excision within 9 years. The major complications include bladder perforation with sling placement <1%, retention requiring " sling lysis 1-3%, transient retention requiring 2-3 days of catheter drainage 33%, and mesh erosion 1-3%.     Johan Phillips MD

## 2023-11-20 RX ORDER — ESTRADIOL 0.05 MG/D
1 PATCH TRANSDERMAL
Qty: 4 PATCH | Refills: 11 | Status: SHIPPED | OUTPATIENT
Start: 2023-11-20 | End: 2024-02-20 | Stop reason: HOSPADM

## 2023-11-28 ENCOUNTER — APPOINTMENT (OUTPATIENT)
Dept: RADIOLOGY | Facility: HOSPITAL | Age: 43
End: 2023-11-28
Payer: COMMERCIAL

## 2023-12-05 ENCOUNTER — OFFICE VISIT (OUTPATIENT)
Dept: GASTROENTEROLOGY | Facility: CLINIC | Age: 43
End: 2023-12-05
Payer: COMMERCIAL

## 2023-12-05 ENCOUNTER — LAB (OUTPATIENT)
Dept: LAB | Facility: LAB | Age: 43
End: 2023-12-05
Payer: COMMERCIAL

## 2023-12-05 VITALS
SYSTOLIC BLOOD PRESSURE: 121 MMHG | WEIGHT: 164 LBS | OXYGEN SATURATION: 99 % | HEIGHT: 66 IN | DIASTOLIC BLOOD PRESSURE: 84 MMHG | HEART RATE: 77 BPM | BODY MASS INDEX: 26.36 KG/M2

## 2023-12-05 DIAGNOSIS — R10.9 ABDOMINAL PAIN, UNSPECIFIED ABDOMINAL LOCATION: ICD-10-CM

## 2023-12-05 DIAGNOSIS — R74.8 ELEVATED LIVER ENZYMES: ICD-10-CM

## 2023-12-05 DIAGNOSIS — K76.0 FATTY LIVER: ICD-10-CM

## 2023-12-05 LAB
HAV AB SER QL IA: NONREACTIVE
HBV SURFACE AB SER-ACNC: <3.1 MIU/ML

## 2023-12-05 PROCEDURE — 99215 OFFICE O/P EST HI 40 MIN: CPT | Performed by: INTERNAL MEDICINE

## 2023-12-05 PROCEDURE — 86364 TISS TRNSGLTMNASE EA IG CLAS: CPT

## 2023-12-05 PROCEDURE — 86708 HEPATITIS A ANTIBODY: CPT

## 2023-12-05 PROCEDURE — 1036F TOBACCO NON-USER: CPT | Performed by: INTERNAL MEDICINE

## 2023-12-05 PROCEDURE — 86706 HEP B SURFACE ANTIBODY: CPT

## 2023-12-05 PROCEDURE — 36415 COLL VENOUS BLD VENIPUNCTURE: CPT

## 2023-12-05 PROCEDURE — 86258 DGP ANTIBODY EACH IG CLASS: CPT

## 2023-12-05 ASSESSMENT — PAIN SCALES - GENERAL: PAINLEVEL: 2

## 2023-12-05 ASSESSMENT — ENCOUNTER SYMPTOMS
OCCASIONAL FEELINGS OF UNSTEADINESS: 0
LOSS OF SENSATION IN FEET: 0
DEPRESSION: 0

## 2023-12-05 ASSESSMENT — COLUMBIA-SUICIDE SEVERITY RATING SCALE - C-SSRS
2. HAVE YOU ACTUALLY HAD ANY THOUGHTS OF KILLING YOURSELF?: NO
1. IN THE PAST MONTH, HAVE YOU WISHED YOU WERE DEAD OR WISHED YOU COULD GO TO SLEEP AND NOT WAKE UP?: NO
6. HAVE YOU EVER DONE ANYTHING, STARTED TO DO ANYTHING, OR PREPARED TO DO ANYTHING TO END YOUR LIFE?: NO

## 2023-12-05 ASSESSMENT — PATIENT HEALTH QUESTIONNAIRE - PHQ9
1. LITTLE INTEREST OR PLEASURE IN DOING THINGS: NOT AT ALL
2. FEELING DOWN, DEPRESSED OR HOPELESS: NOT AT ALL
SUM OF ALL RESPONSES TO PHQ9 QUESTIONS 1 AND 2: 0

## 2023-12-05 NOTE — PROGRESS NOTES
HEPATOLOGY NEW PATIENT VISIT    December 5, 2023    Dr. Abiola Muhammad       Patient Name:   ERVIN KISER  Medical Record Number: [id number]  YOB: 1980    Dear Dr. Muhammad,    I had the pleasure of seeing Ervin Kiser for consultation in the [facility] ([facility] office). History and physical examination was performed. Pertinent available laboratory, imaging, pathology results were reviewed.  I spent over 30 minutes reviewing her chart in preparation for this visit.    History of Present Illness:   The patient is a 43 year old white female who is referred for evaluation of elevated liver function tests and a history of mild fatty liver disease.    The patient has a very long history of right upper quadrant abdominal pain.  She has been seen by a multitude of gastroenterologists in different healthcare systems.  She has undergone extensive workup in the past which has not been overly revealing.  She has also undergone a multitude of endoscopic procedures by a variety of different gastroenterologists which have not been overly revealing.  She did have her gallbladder removed about 20 years ago.  She thinks that the pain for the last several years is different than the gallbladder pain.    She has been told in the past that she had fatty liver disease (although imaging studies of the leg do not show this).  Throughout this workup, she has had mildly elevated liver enzymes and underwent a liver biopsy in 2012 that just showed some mild fatty change.    The patient has risk factors for fatty liver disease including hyperlipidemia, and being overweight.  She has never been on lipid-lowering agents.  Her PCP told her that this could be a problem with her liver issues.  She did weigh up to 200 pounds but has lost 40 pounds on her own over the last year with diet and exercise.  She has no history of diabetes but was apparently put on metformin at some point but she could not  tolerate this.    The patient denied any past history of acute hepatitis or jaundice.      She has never had any manifestations of liver disease including no jaundice, ascites, hepatic encephalopathy, or variceal bleeding.     She tries to avoid medications.  She is currently just taking a probiotic, vitamin D, and a PPI.  She has been on the PPI for many years with only minimal relief of her right upper quadrant pain.    She presented today for evaluation.  She continues to have the right upper quadrant pain which is constant and moderate in intensity.  It did not change in the past despite gluten avoidance and lactose avoidance.  There is no change in the pain with position changes.  There is no particular relationship to meals.  The pain does not radiate.  She does get some improvement with NSAIDs, but tries to avoid them if possible.  She denied any trauma to the abdomen or back.  She has no history of shingles.    Past Medical/Surgical History:    · Acute sinusitis (461.9) (J01.90)   · Arthritis of right acromioclavicular joint (716.91) (M19.011)   · Ataxia (781.3) (R27.0)   · Bladder pain (788.99) (R39.89)   · Bursitis of right shoulder (726.10) (M75.51)   · Cervical radiculopathy (723.4) (M54.12)   · Cervicalgia (723.1) (M54.2)   · Chronic abdominal pain (789.00,338.29) (R10.9,G89.29)   · Chronic migraine   · Cold intolerance (780.99) (R68.89)   · Constipation (564.00) (K59.00)   · Degeneration of intervertebral disc of cervical region (722.4) (M50.30)   · Degeneration of intervertebral disc of lumbar region (722.52) (M51.36)   · Diarrhea (787.91) (R19.7)   · Dysuria (788.1) (R30.0)   · Elevated liver enzymes (790.5) (R74.8)   · Gastritis, erosive (535.40) (K29.60)   · Hemorrhoids (455.6) (K64.9)   · Herniated nucleus pulposus of lumbosacral region (722.10) (M51.27)   · Herniation of cervical intervertebral disc with radiculopathy (722.0) (M50.10)   · High-tone pelvic floor dysfunction (629.89) (N94.89)   ·  HNP (herniated nucleus pulposus), cervical (722.0) (M50.20)   · Knee pain, right (719.46) (M25.561)   · Lumbar radiculopathy (724.4) (M54.16)   · Lumbar spondylosis (721.3) (M47.816)   · Menopause (627.2) (Z78.0)   · Need for prophylactic antibiotic (V58.62) (Z79.2)   · Nephrolithiasis (592.0) (N20.0)   · Neuropathic pain of chest (353.8) (M79.2)   · Obstructive sleep apnea, adult (327.23) (G47.33)   · Odynophagia (787.20) (R13.10)   · Open wound of umbilical region (879.2) (S31.105A)   · Pain of right breast (611.71) (N64.4)   · Pre-op testing (V72.84) (Z01.818)   · Rectal bleeding (569.3) (K62.5)   · Right shoulder pain (719.41) (M25.511)   · Screening for STD (sexually transmitted disease) (V74.5) (Z11.3)   · Shoulder impingement, right (719.81) (M25.811)   · Spinal stenosis of cervical region (723.0) (M48.02)   · 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)   · Umbilical hernia (553.1) (K42.9)   · Urinary incontinence (788.30) (R32)   · Urinary urgency (788.63) (R39.15)   · Vaginal discharge (623.5) (N89.8)   · Weight gain (783.1) (R63.5)   · History of Dyslipidemia (272.4) (E78.5)   · History of fatty infiltration of liver (V12.79) (Z87.19)   · History of Cervical Loop Electrosurgical Excision (LEEP)   · History of Cholecystectomy Laparoscopic   · History of Colonoscopy   · History of Esophagogastroduodenoscopy   · History of Exploratory laparotomy   · after infected umbilical hernia repair   · History of Hysterectomy   · partial prior to her total abdominal hysterectomy   · History of Tonsillectomy   · History of Total Abdominal Hysterectomy   · History of Umbilical hernia repair   · x2  Mixed hyperlipidemia  Depressive disorder  Irritable bowel syndrome  Hyperlipidemia  Abnormal liver function tests  Obstructive sleep apnea syndrome  Chronic abdominal pain  HNP (herniated nucleus pulposus), cervical  Herniated nucleus pulposus of lumbosacral region  Nonalcoholic fatty liver disease  Arthritis of right  acromioclavicular joint  Bursitis of right shoulder  Spinal stenosis of cervical region  Cervicalgia  Chronic migraine  Elevated liver enzymes  High-tone pelvic floor dysfunction  Knee pain, right  Lumbar spondylosis  Shoulder impingement, right  Subepithelial gastric mass  Umbilical hernia  Weight gain  Urinary incontinence  Herniation of cervical intervertebral disc with radiculopathy  Lumbar radiculopathy  Hair loss  TMJ dysfunction  DEBRA (stress urinary incontinence, female)    Family History:   There is no known family history of liver disease.  Her paternal grandfather had colon cancer in his mid 70s.    Social History:   She denied tobacco use.  She denied intravenous drug use.  She denied alcohol use.  She does have tattoos.  She denied blood transfusions.    Review of systems: As noted above.  She has the right upper quadrant pain as noted above.  She does have some issues with anxiety.  She gets hot flashes.  She does have musculoskeletal pains.  She gets swelling of her hands and legs.  She has lost 40 pounds intentionally over the last year.  She does have shortness of breath.  She does get headaches.  No fever, chills, visual changes, auditory changes, chest pain, GI bleeding, diarrhea, constipation, depression, dysuria, hematuria, or rash.    Medical, Surgical, Family, and Social Histories  Past Medical History:   Diagnosis Date    Dysuria 05/08/2023    Hyperlipidemia, unspecified 04/14/2016    Dyslipidemia    Neuropathic pain of chest 05/08/2023    Pain of right breast 05/08/2023    Personal history of other diseases of the digestive system 07/07/2020    History of fatty infiltration of liver    Urinary urgency 05/08/2023    Vaginal discharge 05/08/2023       Past Surgical History:   Procedure Laterality Date    CERVICAL BIOPSY  W/ LOOP ELECTRODE EXCISION  04/16/2015    Cervical Loop Electrosurgical Excision (LEEP)    CHOLECYSTECTOMY  04/16/2015    Cholecystectomy Laparoscopic    HYSTERECTOMY   09/07/2021    Hysterectomy    OTHER SURGICAL HISTORY  07/08/2020    Exploratory laparotomy    OTHER SURGICAL HISTORY  07/08/2020    Umbilical hernia repair    OTHER SURGICAL HISTORY  07/08/2020    Tonsillectomy    OTHER SURGICAL HISTORY  02/16/2021    Esophagogastroduodenoscopy    OTHER SURGICAL HISTORY  02/16/2021    Colonoscopy    TOTAL ABDOMINAL HYSTERECTOMY  04/16/2015    Total Abdominal Hysterectomy       Family History   Problem Relation Name Age of Onset    Diabetes Father      Hypertension Father      Diabetes Father's Sister      Stomach cancer Father's Sister      Diabetes Paternal Grandmother      Hypertension Paternal Grandmother      Coronary artery disease Paternal Grandmother          MI>60s    Diabetes Paternal Cousin         Social History     Socioeconomic History    Marital status:      Spouse name: Not on file    Number of children: Not on file    Years of education: Not on file    Highest education level: Not on file   Occupational History    Not on file   Tobacco Use    Smoking status: Never    Smokeless tobacco: Never   Substance and Sexual Activity    Alcohol use: Not on file    Drug use: Not on file    Sexual activity: Not on file   Other Topics Concern    Not on file   Social History Narrative    Not on file     Social Determinants of Health     Financial Resource Strain: Not on file   Food Insecurity: Not on file   Transportation Needs: Not on file   Physical Activity: Not on file   Stress: Not on file   Social Connections: Not on file   Intimate Partner Violence: Not on file   Housing Stability: Not on file       Allergies and Current Medications  Allergies   Allergen Reactions    Pregabalin Other     paranoid    Bupropion Headache     HAs    Gabapentin Other     fatigue    Hydrochlorothiazide Other     N    Naltrexone-Bupropion Headache     headaches    Ondansetron Hcl Other     worsening N    Promethazine Other     nausea / vomiting    Trazodone Headache    Penicillins Rash  "    Current Outpatient Medications   Medication Sig Dispense Refill    estradiol (Climara) 0.05 mg/24 hr patch Place 1 patch over 7 days on the skin 1 (one) time per week. 4 patch 11    famotidine (Pepcid) 40 mg tablet Take 1 tablet (40 mg) by mouth once daily at bedtime.      ibuprofen 600 mg tablet Take 1 tablet (600 mg) by mouth every 8 hours if needed. W/ food and water      Lactobacillus acidophilus (PROBIOTIC ACIDOPHILUS ORAL) Probiotic Acidophilus oral capsules      omeprazole (PriLOSEC) 40 mg DR capsule Take 1 capsule (40 mg) by mouth once daily.      ALBUTEROL SULFATE INHL Inhale 1-2 puffs. Every 4-6 hours as needed  Albuterol sulfate  (90 Base) mcg/Act inhalation aerosol      estradiol (Estrace) 1 mg tablet Take 1 tablet (1 mg) by mouth once daily.      metFORMIN XR (Glucophage-XR) 500 mg 24 hr tablet TAKE 1 TABLET BY MOUTH DAILY WITH BREAKFAST FOR 15 DAYS, THEN 2 TABLETS DAILY WITH BREAKFAST.      VITAMIN B COMPLEX ORAL Vitamin B Complex oral tablets       No current facility-administered medications for this visit.        Physical Exam  /84 (BP Location: Right arm, Patient Position: Sitting, BP Cuff Size: Adult)   Pulse 77   Ht 1.676 m (5' 6\")   Wt 74.4 kg (164 lb)   SpO2 99%   BMI 26.47 kg/m²       Physical Examination:   General Appearance: alert and in no acute distress.   HEENT: oropharynx without lesions. Anicteric sclerae.   Neck supple, nontender, without adenopathy, thyromegaly, or JVD.   Lungs clear to auscultation and percussion.   Heart RRR without murmurs, rubs, or gallops.   Abdomen: Soft, mildly tender in the right upper quadrant without rebound, bowel sounds positive, without obvious ascites. Liver and spleen not palpable.   Extremities full ROM, no atrophy, normal strength. No edema.   Skin no specific lesions.   Neurological exam nonfocal, alert and oriented.  No asterixis.  No spider angiomata, or palmar erythema.     Labs 11/11/2023 cholesterol 298, , " triglycerides 232.    Labs 10/8/2023 mononucleosis screen negative lipase 17, glucose 86, sodium 139, creatinine 0.79, protein 7.4, albumin 4.9, alk phos 90, bilirubin 1.9, AST 31, ALT 43, WBC 5, hemoglobin 14.9, platelets 163.    Labs 9/19/2023 AST 20, ALT 24, bilirubin 0.9, direct bilirubin 0.1, smooth muscle antibody negative, iron saturation 22%, ferritin 199, ceruloplasmin 36.    Labs 8/7/2023 hepatitis B surface antigen negative, hepatitis C antibody negative, KOSTA negative, TSH 1.97.    Labs 8/5/2023 bilirubin 1.1, AST 52, ALT 94.    Labs 6/3/2023 hemoglobin A1c 4.6%.    Labs 2/11/2023 AST 27, ALT 41, bilirubin 1.1.    Labs 4/2/2022 AST 36, ALT 59, bilirubin 1.5.    Labs 12/13/2021 AST 22, ALT 32, bilirubin 1.2, direct bilirubin 0.2.    Labs 10/27/2021 hepatitis B DNA undetectable, antimitochondrial antibody negative, hepatitis C antibody negative, ceruloplasmin 28.    Labs 10/13/2021 AFP < 4.    Labs 12/17/2020 AST 29, ALT 43.    Labs 11/9/2019 LKM -.    Ultrasound with Dopplers 10/11/2023:  IMPRESSION:  Previous cholecystectomy.      Mildly elevated main hepatic artery resistive index. The Doppler  portion of this exam was otherwise within the limits of normal.      Remainder of the exam was negative.    CAT scan with contrast 10/8/2023:  IMPRESSION:  Previous cholecystectomy.      Mildly elevated main hepatic artery resistive index. The Doppler  portion of this exam was otherwise within the limits of normal.      Remainder of the exam was negative.    The liver is elongated in the right lobe but there were no focal liver lesions seen.    Ultrasound 2/19/2023 revealed a normal study:  IMPRESSION:  Previous cholecystectomy.      Mildly elevated main hepatic artery resistive index. The Doppler  portion of this exam was otherwise within the limits of normal.      Remainder of the exam was negative.    MRI with contrast with MRCP 1/19/2020:  IMPRESSION:  1. Cholecystectomy. No intra or extrahepatic biliary  dilation. No  biliary filling defects to suggest choledocholithiasis.  2. Mild right pelvicaliceal dilation, unchanged compared to  10/22/2019 CT.  3. Mild hepatosplenomegaly, unchanged compared to 2011 MRI.    CAT scan with contrast 10/22/2019:  IMPRESSION:  No acute intra-abdominal process.     Normal appendix.    The liver was reported to be normal.    EUS 3/2/2023:  Impression:            - Normal esophagus.                         - Gastroesophageal flap valve classified as Hill Grade                          III (minimal fold, loose to endoscope, hiatal hernia                          likely).                         - Mildly erythematous mucosa in the stomach.                         - Previously identified submucosal nodule in the                          gastric antrum was no longer seen.                         - Normal examined duodenum.                         - Endosonographic images of the stomach were                          unremarkable. Specifically exam of the antrum showed                          no submucosal lesion or tumor. No pathologic                          lymphadenopathy, no masses and no wall thickening were                          identified.                         - There was no sign of significant endosonographic                          abnormality in the entire pancreas. There were no                          endosonographic signs of chronic pancreatitis. No                          masses, cysts or calcifications were seen in the                          entire pancreas. The pancreatic duct was well                          visualized from ampulla to tail, excluding pancreas                          divisum. The pancreatic duct was regular in contour                          and was not dilated or ectatic.                         - There was no sign of significant pathology in the                          common bile duct.                         - Visualized portions of the  liver, spleen, left                          adrenal gland and left kidney were normal on                          ultrasound examination.                         - No abnormal-appearing lymph nodes were identified in                          the abdomen                         - No specimens collected.    EGD 11/19/2021:  Impression:            - Normal esophagus.                         - Portal hypertensive gastropathy.                         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 Biopsied.                         - Normal examined duodenum.        Liver biopsy 1/25/2012 revealed slight fatty change of the liver, no evidence of acute necrosis, inflammatory process, or malignancy.  Trichrome, PAS, and iron stains were unremarkable.          Assessment/Plan:     Elevated liver function tests   History of mild fatty liver disease on 1/2012 liver biopsy  Chronic abdominal pain    The patient is referred to me for evaluation of elevated liver function tests and mild fatty liver disease.    As noted above, she has a history of chronic abdominal pain and has been seen by a multitude of gastroenterologists.  They have also noted the mildly elevated liver enzymes and she has undergone extensive serologic workup and imaging studies for this which have been unrevealing.  I do not feel that a repeat serologic workup is warranted.    A liver biopsy in 2012 did show mild fatty liver disease.    We did speak about fatty liver disease. I explained that the risk factors include diabetes, obesity, hyperlipidemia and alcohol use. I explained that she needs to work on diet, weight loss and exercise.  She also needs to work aggressively with the primary provider about hyperlipidemia.  I did explain that 20-25% of patients with FUENTES may proceed to cirrhosis.    I reminded her that working on the same risk factors could decrease her future risk of heart disease and stroke.    Her PCP  should strongly consider lipid-lowering agents for her.  I would not be opposed to this from a liver standpoint.    I will stage her liver disease with a FibroSCAN test.  Depending on that result, we can decide whether a repeat liver biopsy is warranted.    I will check for hepatitis A and hepatitis B immunity.  If not immune, then vaccines would be warranted.    I suspect that she also has underlying Gilbert's syndrome given the elevated indirect bilirubin level.  This is a benign condition and does not need to be pursued any further.    She can follow-up with Dr. Lauren for her GI needs including her chronic abdominal pain which would not be explained by the mildly elevated LFTs or even mild fatty liver disease.  It is quite possible that she has functional bowel or fibromyalgia and might benefit from other therapies such as antidepressants.  I will send off a celiac panel.    Thank you for allowing me to participate in the care of this patient. Please feel free to contact me with any questions regarding their care.     Sincerely,     James Hardwick MD, FAASLD, FACG.   Medical Director, Hepatology  Senior Attending Physician  [facility]  [facility]  Professor of Medicine  Division of Gastroenterology and Liver Disease  [employer]  [address] [place] [id number]  Phone: [phone]  Fax: [phone].    Cc: Dr. Wagner Lauren      This document was generated with a computerized dictation system.  Because of this, there could be errors in grammar and/or content.

## 2023-12-06 LAB
GLIADIN PEPTIDE IGA SER IA-ACNC: <1 U/ML
GLIADIN PEPTIDE IGG SER IA-ACNC: <1 U/ML
TTG IGA SER IA-ACNC: <1 U/ML
TTG IGG SER IA-ACNC: <1 U/ML

## 2023-12-07 ENCOUNTER — TELEMEDICINE CLINICAL SUPPORT (OUTPATIENT)
Dept: PREADMISSION TESTING | Facility: HOSPITAL | Age: 43
End: 2023-12-07
Payer: COMMERCIAL

## 2023-12-07 NOTE — PREPROCEDURE INSTRUCTIONS
Medication List            Accurate as of December 7, 2023 10:51 AM. Always use your most recent med list.                ALBUTEROL SULFATE INHL     * estradiol 1 mg tablet  Commonly known as: Estrace     * estradiol 0.05 mg/24 hr patch  Commonly known as: Climara  Place 1 patch over 7 days on the skin 1 (one) time per week.     famotidine 40 mg tablet  Commonly known as: Pepcid     ibuprofen 600 mg tablet     metFORMIN  mg 24 hr tablet  Commonly known as: Glucophage-XR     omeprazole 40 mg DR capsule  Commonly known as: PriLOSEC     PROBIOTIC ACIDOPHILUS ORAL     VITAMIN B COMPLEX ORAL           * This list has 2 medication(s) that are the same as other medications prescribed for you. Read the directions carefully, and ask your doctor or other care provider to review them with you.                          NPO Instructions:     Do not drink any liquid after midnight the night before your surgery  Do not eat any food after midnight the night before your surgery/procedure.  Candy, gum, mints and smoking of cigarettes, marijuana or vaping is not permitted after midnight prior to your surgery   Do not drink Alcohol 24 hours prior to surgery      Increase fluid intake day before surgery    Additional Instructions:      Wear  comfortable loose fitting clothing    Do not use moisturizers, creams, lotions or perfume    All jewelry and valuables should be left at home. May bring glasses and partials.    Stop blood thinning medications as instructed by ordering physician or surgeon.  Stop Ibuprofen 7 days prior to surgery.    Shower or bathe the night before or day of surgery      Brush teeth and avoid perfumes, colognes, powders, makeup, aftershave and hair spray    Go to Registration, in the main lobby, upon arrival on the day of surgery and have 's license and medical insurance card available.    Please have a responsible adult to drive you home and be available to help you as needed after surgery.    Call  [phone] the day before your surgery to find out what time you are to arrive the next day.         NPO Instructions:    Do not eat any food after midnight the night before your surgery/procedure.    Additional Instructions:     Seven/Six Days before Surgery:  Review your medication instructions, stop indicated medications  Day of Surgery:  Wear  comfortable loose fitting clothing

## 2023-12-11 ENCOUNTER — HOSPITAL ENCOUNTER (OUTPATIENT)
Dept: NEUROLOGY | Facility: CLINIC | Age: 43
Discharge: HOME | End: 2023-12-11
Payer: COMMERCIAL

## 2023-12-11 DIAGNOSIS — G56.03 CARPAL TUNNEL SYNDROME, BILATERAL: ICD-10-CM

## 2023-12-11 PROCEDURE — 95886 MUSC TEST DONE W/N TEST COMP: CPT | Performed by: PSYCHIATRY & NEUROLOGY

## 2023-12-11 PROCEDURE — 95911 NRV CNDJ TEST 9-10 STUDIES: CPT | Performed by: PSYCHIATRY & NEUROLOGY

## 2023-12-11 PROCEDURE — 95911 NRV CNDJ TEST 9-10 STUDIES: CPT | Mod: GC | Performed by: PSYCHIATRY & NEUROLOGY

## 2023-12-11 PROCEDURE — 95886 MUSC TEST DONE W/N TEST COMP: CPT | Mod: GC | Performed by: PSYCHIATRY & NEUROLOGY

## 2023-12-14 ENCOUNTER — APPOINTMENT (OUTPATIENT)
Dept: NEUROLOGY | Facility: HOSPITAL | Age: 43
End: 2023-12-14
Payer: COMMERCIAL

## 2023-12-20 ENCOUNTER — ANESTHESIA EVENT (OUTPATIENT)
Dept: OPERATING ROOM | Facility: HOSPITAL | Age: 43
End: 2023-12-20
Payer: COMMERCIAL

## 2023-12-20 ENCOUNTER — OFFICE VISIT (OUTPATIENT)
Dept: ORTHOPEDIC SURGERY | Facility: CLINIC | Age: 43
End: 2023-12-20
Payer: COMMERCIAL

## 2023-12-20 VITALS — HEIGHT: 66 IN | BODY MASS INDEX: 25.71 KG/M2 | WEIGHT: 160 LBS

## 2023-12-20 DIAGNOSIS — G56.03 CARPAL TUNNEL SYNDROME, BILATERAL: Primary | ICD-10-CM

## 2023-12-20 PROCEDURE — 20526 THER INJECTION CARP TUNNEL: CPT | Performed by: ORTHOPAEDIC SURGERY

## 2023-12-20 PROCEDURE — 1036F TOBACCO NON-USER: CPT | Performed by: ORTHOPAEDIC SURGERY

## 2023-12-20 PROCEDURE — 99214 OFFICE O/P EST MOD 30 MIN: CPT | Performed by: ORTHOPAEDIC SURGERY

## 2023-12-20 RX ORDER — LIDOCAINE HYDROCHLORIDE 10 MG/ML
1 INJECTION INFILTRATION; PERINEURAL
Status: COMPLETED | OUTPATIENT
Start: 2023-12-20 | End: 2023-12-20

## 2023-12-20 RX ADMIN — LIDOCAINE HYDROCHLORIDE 1 ML: 10 INJECTION INFILTRATION; PERINEURAL at 09:32

## 2023-12-20 ASSESSMENT — PAIN - FUNCTIONAL ASSESSMENT: PAIN_FUNCTIONAL_ASSESSMENT: 0-10

## 2023-12-20 ASSESSMENT — PAIN SCALES - GENERAL: PAINLEVEL_OUTOF10: 4

## 2023-12-20 NOTE — PROGRESS NOTES
43 y.o. female presents today for follow up of bilateral hand numbness, tingling, weakness and pain. The patient reports symptoms for months, getting worse.  Pain is controlled.  Reports no previous surgeries, injections or trauma to the area.  Reports pain worse with use, better at rest.  Pain numb and tingly, wakes them from sleep.   Worse driving a car and talking on a phone.  Also complains of right dorsal hand pain over 2nd CMC.  Got EMG.  Patient ID: Ronda Dean is a 43 y.o. female.    Hand / UE Inj/Asp: R carpal tunnel for carpal tunnel syndrome on 12/20/2023 9:32 AM  Indications: pain  Details: 25 G needle, volar approach  Medications: 1 mL lidocaine 10 mg/mL (1 %); 10 mg triamcinolone acetonide 10 mg/mL  Outcome: tolerated well, no immediate complications  Procedure, treatment alternatives, risks and benefits explained, specific risks discussed. Consent was given by the patient. Immediately prior to procedure a time out was called to verify the correct patient, procedure, equipment, support staff and site/side marked as required. Patient was prepped and draped in the usual sterile fashion.           Review of Systems    Constitutional: no fever, no chills, not feeling tired, no recent weight gain and no recent weight loss.   ENT: no nosebleeds.   Cardiovascular: no chest pain.   Respiratory: no shortness of breath and no cough.   Gastrointestinal: no abdominal pain, no nausea, no vomiting and no diarrhea.   Musculoskeletal: per HPI  Integumentary: no rashes and no skin wound.   Neurological: no headache.   Psychiatric: no depression and no sleep disturbances.   Endocrine: no muscle weakness and no muscle cramps.   Hematologic/Lymphatic: no swollen glands and no tendency for easy bruising.     All other systems have been reviewed and are negative for complaint    Patient's past medical history, past surgical history, allergies, and medications have been reviewed unless otherwise noted in the chart.      Carpal Tunnel Exam  Inspection:  no evidence of infection, no edema, no erythema, no ecchymosis, Palpation:  compartments are soft, no pain with palpation, Range of Motion:  full wrist and finger range of motion, Stability:  no wrist instability detected, Strength:  5/5 APB and intrinsics, Skin:  intact, Vascular:  capillary refill <2 seconds distally, Sensation:  decreased in the median nerve distribution, Test:  positive Tinel's at the Carpal Tunnel, positive Direct Carpal Tunnel Compression Test       Mass  Location:  Right dorsal 2nd CMC Specific:  CMC boss Inspection (mass size):  5 mm x 5 mm Palpation:  firm, nonmobile, noncompressible, Range of Motion:  full wrist and finger motion, Stability:  no instability noted, Strength:  5/5 hand and wrist, Skin:  intact, Vascular:  capillary refill <2 seconds distally, Sensation:  sensation intact to light touch distally.       Constitutional   General appearance: Alert and in no acute distress. Well developed, well nourished.    Eyes   External Eye, Conjunctiva and lids: Normal external exam - pupils were equal in size, round, reactive to light (PERRL) with normal accommodation and extraocular movements intact (EOMI).   Ears, Nose, Mouth, and Throat   Hearing: Normal.   Neck   Neck: No neck mass was observed. Supple.   Pulmonary   Respiratory effort: No respiratory distress.   Cardiovascular   Examination of extremities: No peripheral edema.   Abdomen   Abdomen: Soft nontender; no abdominal mass palpated.. No rebound, rigidity or guarding.    Skin   Skin and subcutaneous tissue: Normal skin color and pigmentation, normal skin turgor, and no rash.   Neurologic   Sensation: Normal.   Psychiatric   Judgment and insight: Intact.   Orientation to person, place, and time: Alert and oriented x 3.       Mood and affect: Normal.     XR - no fractures, dislocations, ulnar negative bilaterally    EMG - normal    Bilateral CTS, right 2nd CMC boss  I discussed the diagnosis  and treatment options with the patient today along with their associated risks and benefits. After thorough discussion, the patient has elected to proceed with conservative management. All questions were answered to the patients satisfaction who seems satisfied with the plan.      Discussion I discussed the diagnosis and treatment options with the patient today along with their associated risks and benefits. After thorough discussion, the patient has elected to proceed with a corticosteroid injection with Kenalog and Xylocaine, which was performed in the office today under aseptic technique and the patient tolerated the procedure well.          Ortho Injections:           Injection site right carpal tunnel. Medication 1 cc 1% Xylocaine, 1 cc 10 mg Kenalog, Injection given under sterile conditions. No immediate complications noted. Post injection instructions given.  Night splint  FU 2 months - No XR

## 2023-12-22 ENCOUNTER — ANESTHESIA (OUTPATIENT)
Dept: OPERATING ROOM | Facility: HOSPITAL | Age: 43
End: 2023-12-22
Payer: COMMERCIAL

## 2023-12-22 ENCOUNTER — PHARMACY VISIT (OUTPATIENT)
Dept: PHARMACY | Facility: CLINIC | Age: 43
End: 2023-12-22
Payer: COMMERCIAL

## 2023-12-22 ENCOUNTER — HOSPITAL ENCOUNTER (OUTPATIENT)
Facility: HOSPITAL | Age: 43
Setting detail: OUTPATIENT SURGERY
Discharge: HOME | End: 2023-12-22
Attending: STUDENT IN AN ORGANIZED HEALTH CARE EDUCATION/TRAINING PROGRAM | Admitting: STUDENT IN AN ORGANIZED HEALTH CARE EDUCATION/TRAINING PROGRAM
Payer: COMMERCIAL

## 2023-12-22 VITALS
RESPIRATION RATE: 16 BRPM | SYSTOLIC BLOOD PRESSURE: 112 MMHG | TEMPERATURE: 98.7 F | DIASTOLIC BLOOD PRESSURE: 78 MMHG | OXYGEN SATURATION: 100 % | HEART RATE: 73 BPM

## 2023-12-22 DIAGNOSIS — N39.3 SUI (STRESS URINARY INCONTINENCE, FEMALE): ICD-10-CM

## 2023-12-22 DIAGNOSIS — N39.0 LOWER URINARY TRACT INFECTIOUS DISEASE: Primary | ICD-10-CM

## 2023-12-22 PROCEDURE — 2500000005 HC RX 250 GENERAL PHARMACY W/O HCPCS: Performed by: STUDENT IN AN ORGANIZED HEALTH CARE EDUCATION/TRAINING PROGRAM

## 2023-12-22 PROCEDURE — 3600000003 HC OR TIME - INITIAL BASE CHARGE - PROCEDURE LEVEL THREE: Performed by: STUDENT IN AN ORGANIZED HEALTH CARE EDUCATION/TRAINING PROGRAM

## 2023-12-22 PROCEDURE — 7100000010 HC PHASE TWO TIME - EACH INCREMENTAL 1 MINUTE: Performed by: STUDENT IN AN ORGANIZED HEALTH CARE EDUCATION/TRAINING PROGRAM

## 2023-12-22 PROCEDURE — 7100000002 HC RECOVERY ROOM TIME - EACH INCREMENTAL 1 MINUTE: Performed by: STUDENT IN AN ORGANIZED HEALTH CARE EDUCATION/TRAINING PROGRAM

## 2023-12-22 PROCEDURE — 2500000005 HC RX 250 GENERAL PHARMACY W/O HCPCS

## 2023-12-22 PROCEDURE — 2500000004 HC RX 250 GENERAL PHARMACY W/ HCPCS (ALT 636 FOR OP/ED): Performed by: ANESTHESIOLOGY

## 2023-12-22 PROCEDURE — 57288 REPAIR BLADDER DEFECT: CPT | Performed by: STUDENT IN AN ORGANIZED HEALTH CARE EDUCATION/TRAINING PROGRAM

## 2023-12-22 PROCEDURE — 7100000009 HC PHASE TWO TIME - INITIAL BASE CHARGE: Performed by: STUDENT IN AN ORGANIZED HEALTH CARE EDUCATION/TRAINING PROGRAM

## 2023-12-22 PROCEDURE — RXMED WILLOW AMBULATORY MEDICATION CHARGE

## 2023-12-22 PROCEDURE — 3600000008 HC OR TIME - EACH INCREMENTAL 1 MINUTE - PROCEDURE LEVEL THREE: Performed by: STUDENT IN AN ORGANIZED HEALTH CARE EDUCATION/TRAINING PROGRAM

## 2023-12-22 PROCEDURE — 2500000004 HC RX 250 GENERAL PHARMACY W/ HCPCS (ALT 636 FOR OP/ED)

## 2023-12-22 PROCEDURE — 2500000001 HC RX 250 WO HCPCS SELF ADMINISTERED DRUGS (ALT 637 FOR MEDICARE OP): Performed by: STUDENT IN AN ORGANIZED HEALTH CARE EDUCATION/TRAINING PROGRAM

## 2023-12-22 PROCEDURE — C1771 REP DEV, URINARY, W/SLING: HCPCS | Performed by: STUDENT IN AN ORGANIZED HEALTH CARE EDUCATION/TRAINING PROGRAM

## 2023-12-22 PROCEDURE — 2500000004 HC RX 250 GENERAL PHARMACY W/ HCPCS (ALT 636 FOR OP/ED): Performed by: STUDENT IN AN ORGANIZED HEALTH CARE EDUCATION/TRAINING PROGRAM

## 2023-12-22 PROCEDURE — 3700000001 HC GENERAL ANESTHESIA TIME - INITIAL BASE CHARGE: Performed by: STUDENT IN AN ORGANIZED HEALTH CARE EDUCATION/TRAINING PROGRAM

## 2023-12-22 PROCEDURE — 3700000002 HC GENERAL ANESTHESIA TIME - EACH INCREMENTAL 1 MINUTE: Performed by: STUDENT IN AN ORGANIZED HEALTH CARE EDUCATION/TRAINING PROGRAM

## 2023-12-22 PROCEDURE — 2780000003 HC OR 278 NO HCPCS: Performed by: STUDENT IN AN ORGANIZED HEALTH CARE EDUCATION/TRAINING PROGRAM

## 2023-12-22 PROCEDURE — 2720000007 HC OR 272 NO HCPCS: Performed by: STUDENT IN AN ORGANIZED HEALTH CARE EDUCATION/TRAINING PROGRAM

## 2023-12-22 PROCEDURE — 94760 N-INVAS EAR/PLS OXIMETRY 1: CPT

## 2023-12-22 PROCEDURE — 7100000001 HC RECOVERY ROOM TIME - INITIAL BASE CHARGE: Performed by: STUDENT IN AN ORGANIZED HEALTH CARE EDUCATION/TRAINING PROGRAM

## 2023-12-22 RX ORDER — MIDAZOLAM HYDROCHLORIDE 1 MG/ML
INJECTION, SOLUTION INTRAMUSCULAR; INTRAVENOUS AS NEEDED
Status: DISCONTINUED | OUTPATIENT
Start: 2023-12-22 | End: 2023-12-22

## 2023-12-22 RX ORDER — DIPHENHYDRAMINE HYDROCHLORIDE 50 MG/ML
12.5 INJECTION INTRAMUSCULAR; INTRAVENOUS ONCE AS NEEDED
Status: DISCONTINUED | OUTPATIENT
Start: 2023-12-22 | End: 2023-12-22 | Stop reason: HOSPADM

## 2023-12-22 RX ORDER — ACETAMINOPHEN 500 MG
1000 TABLET ORAL EVERY 6 HOURS PRN
Qty: 30 TABLET | Refills: 0 | Status: SHIPPED | OUTPATIENT
Start: 2023-12-22

## 2023-12-22 RX ORDER — ACETAMINOPHEN 325 MG/1
975 TABLET ORAL ONCE
Status: COMPLETED | OUTPATIENT
Start: 2023-12-22 | End: 2023-12-22

## 2023-12-22 RX ORDER — TRAMADOL HYDROCHLORIDE 50 MG/1
50 TABLET ORAL EVERY 6 HOURS PRN
Qty: 12 TABLET | Refills: 0 | Status: SHIPPED | OUTPATIENT
Start: 2023-12-22 | End: 2023-12-25

## 2023-12-22 RX ORDER — DROPERIDOL 2.5 MG/ML
0.62 INJECTION, SOLUTION INTRAMUSCULAR; INTRAVENOUS ONCE AS NEEDED
Status: DISCONTINUED | OUTPATIENT
Start: 2023-12-22 | End: 2023-12-22 | Stop reason: HOSPADM

## 2023-12-22 RX ORDER — FAMOTIDINE 10 MG/ML
20 INJECTION INTRAVENOUS ONCE
Status: COMPLETED | OUTPATIENT
Start: 2023-12-22 | End: 2023-12-22

## 2023-12-22 RX ORDER — MORPHINE SULFATE 2 MG/ML
2 INJECTION, SOLUTION INTRAMUSCULAR; INTRAVENOUS EVERY 5 MIN PRN
Status: DISCONTINUED | OUTPATIENT
Start: 2023-12-22 | End: 2023-12-22 | Stop reason: HOSPADM

## 2023-12-22 RX ORDER — CEFAZOLIN SODIUM 2 G/100ML
2 INJECTION, SOLUTION INTRAVENOUS ONCE
Status: COMPLETED | OUTPATIENT
Start: 2023-12-22 | End: 2023-12-22

## 2023-12-22 RX ORDER — FENTANYL CITRATE 50 UG/ML
INJECTION, SOLUTION INTRAMUSCULAR; INTRAVENOUS AS NEEDED
Status: DISCONTINUED | OUTPATIENT
Start: 2023-12-22 | End: 2023-12-22

## 2023-12-22 RX ORDER — PHENAZOPYRIDINE HYDROCHLORIDE 100 MG/1
95 TABLET, FILM COATED ORAL
Status: DISCONTINUED | OUTPATIENT
Start: 2023-12-22 | End: 2023-12-22

## 2023-12-22 RX ORDER — DEXAMETHASONE SODIUM PHOSPHATE 4 MG/ML
INJECTION, SOLUTION INTRA-ARTICULAR; INTRALESIONAL; INTRAMUSCULAR; INTRAVENOUS; SOFT TISSUE AS NEEDED
Status: DISCONTINUED | OUTPATIENT
Start: 2023-12-22 | End: 2023-12-22

## 2023-12-22 RX ORDER — PHENAZOPYRIDINE HYDROCHLORIDE 100 MG/1
95 TABLET, FILM COATED ORAL ONCE
Status: COMPLETED | OUTPATIENT
Start: 2023-12-22 | End: 2023-12-22

## 2023-12-22 RX ORDER — SODIUM CHLORIDE, SODIUM LACTATE, POTASSIUM CHLORIDE, CALCIUM CHLORIDE 600; 310; 30; 20 MG/100ML; MG/100ML; MG/100ML; MG/100ML
100 INJECTION, SOLUTION INTRAVENOUS CONTINUOUS
Status: DISCONTINUED | OUTPATIENT
Start: 2023-12-22 | End: 2023-12-22 | Stop reason: HOSPADM

## 2023-12-22 RX ORDER — ALBUTEROL SULFATE 0.83 MG/ML
2.5 SOLUTION RESPIRATORY (INHALATION) ONCE AS NEEDED
Status: DISCONTINUED | OUTPATIENT
Start: 2023-12-22 | End: 2023-12-22 | Stop reason: HOSPADM

## 2023-12-22 RX ORDER — HYDRALAZINE HYDROCHLORIDE 20 MG/ML
5 INJECTION INTRAMUSCULAR; INTRAVENOUS EVERY 30 MIN PRN
Status: DISCONTINUED | OUTPATIENT
Start: 2023-12-22 | End: 2023-12-22 | Stop reason: HOSPADM

## 2023-12-22 RX ORDER — OXYCODONE AND ACETAMINOPHEN 5; 325 MG/1; MG/1
1 TABLET ORAL EVERY 4 HOURS PRN
Status: DISCONTINUED | OUTPATIENT
Start: 2023-12-22 | End: 2023-12-22 | Stop reason: HOSPADM

## 2023-12-22 RX ORDER — CELECOXIB 200 MG/1
400 CAPSULE ORAL ONCE
Status: COMPLETED | OUTPATIENT
Start: 2023-12-22 | End: 2023-12-22

## 2023-12-22 RX ORDER — LABETALOL HYDROCHLORIDE 5 MG/ML
5 INJECTION, SOLUTION INTRAVENOUS ONCE AS NEEDED
Status: DISCONTINUED | OUTPATIENT
Start: 2023-12-22 | End: 2023-12-22 | Stop reason: HOSPADM

## 2023-12-22 RX ORDER — PROPOFOL 10 MG/ML
INJECTION, EMULSION INTRAVENOUS AS NEEDED
Status: DISCONTINUED | OUTPATIENT
Start: 2023-12-22 | End: 2023-12-22

## 2023-12-22 RX ORDER — LIDOCAINE HCL/PF 100 MG/5ML
SYRINGE (ML) INTRAVENOUS AS NEEDED
Status: DISCONTINUED | OUTPATIENT
Start: 2023-12-22 | End: 2023-12-22

## 2023-12-22 RX ORDER — POLYETHYLENE GLYCOL 3350 17 G/17G
17 POWDER, FOR SOLUTION ORAL DAILY
Qty: 510 G | Refills: 0 | Status: SHIPPED | OUTPATIENT
Start: 2023-12-22 | End: 2024-02-20 | Stop reason: HOSPADM

## 2023-12-22 RX ORDER — BUPIVACAINE HYDROCHLORIDE 2.5 MG/ML
INJECTION, SOLUTION INFILTRATION; PERINEURAL AS NEEDED
Status: DISCONTINUED | OUTPATIENT
Start: 2023-12-22 | End: 2023-12-22 | Stop reason: HOSPADM

## 2023-12-22 RX ORDER — KETOROLAC TROMETHAMINE 10 MG/1
10 TABLET, FILM COATED ORAL EVERY 6 HOURS PRN
Qty: 20 TABLET | Refills: 0 | Status: SHIPPED | OUTPATIENT
Start: 2023-12-22 | End: 2023-12-29

## 2023-12-22 RX ORDER — SCOLOPAMINE TRANSDERMAL SYSTEM 1 MG/1
1 PATCH, EXTENDED RELEASE TRANSDERMAL ONCE
Status: DISCONTINUED | OUTPATIENT
Start: 2023-12-22 | End: 2023-12-22 | Stop reason: HOSPADM

## 2023-12-22 RX ORDER — MEPERIDINE HYDROCHLORIDE 25 MG/ML
12.5 INJECTION INTRAMUSCULAR; INTRAVENOUS; SUBCUTANEOUS EVERY 10 MIN PRN
Status: DISCONTINUED | OUTPATIENT
Start: 2023-12-22 | End: 2023-12-22 | Stop reason: HOSPADM

## 2023-12-22 RX ORDER — LIDOCAINE HYDROCHLORIDE 10 MG/ML
0.1 INJECTION, SOLUTION EPIDURAL; INFILTRATION; INTRACAUDAL; PERINEURAL ONCE
Status: DISCONTINUED | OUTPATIENT
Start: 2023-12-22 | End: 2023-12-22 | Stop reason: HOSPADM

## 2023-12-22 RX ADMIN — SODIUM CHLORIDE, POTASSIUM CHLORIDE, SODIUM LACTATE AND CALCIUM CHLORIDE 100 ML/HR: 600; 310; 30; 20 INJECTION, SOLUTION INTRAVENOUS at 13:30

## 2023-12-22 RX ADMIN — MIDAZOLAM HYDROCHLORIDE 2 MG: 1 INJECTION, SOLUTION INTRAMUSCULAR; INTRAVENOUS at 15:38

## 2023-12-22 RX ADMIN — CEFAZOLIN SODIUM 2 G: 2 INJECTION, SOLUTION INTRAVENOUS at 15:25

## 2023-12-22 RX ADMIN — HYDROMORPHONE HYDROCHLORIDE 0.5 MG: 1 INJECTION, SOLUTION INTRAMUSCULAR; INTRAVENOUS; SUBCUTANEOUS at 16:45

## 2023-12-22 RX ADMIN — ACETAMINOPHEN 975 MG: 325 TABLET ORAL at 13:30

## 2023-12-22 RX ADMIN — PHENAZOPYRIDINE 100 MG: 100 TABLET ORAL at 13:34

## 2023-12-22 RX ADMIN — PROPOFOL 150 MG: 10 INJECTION, EMULSION INTRAVENOUS at 15:38

## 2023-12-22 RX ADMIN — FAMOTIDINE 20 MG: 10 INJECTION, SOLUTION INTRAVENOUS at 13:30

## 2023-12-22 RX ADMIN — SCOPALAMINE 1 PATCH: 1 PATCH, EXTENDED RELEASE TRANSDERMAL at 14:01

## 2023-12-22 RX ADMIN — CELECOXIB 400 MG: 200 CAPSULE ORAL at 13:30

## 2023-12-22 RX ADMIN — DEXAMETHASONE SODIUM PHOSPHATE 4 MG: 4 INJECTION, SOLUTION INTRAMUSCULAR; INTRAVENOUS at 15:44

## 2023-12-22 RX ADMIN — FENTANYL CITRATE 100 MCG: 50 INJECTION, SOLUTION INTRAMUSCULAR; INTRAVENOUS at 15:38

## 2023-12-22 RX ADMIN — LIDOCAINE HYDROCHLORIDE 100 MG: 20 INJECTION INTRAVENOUS at 15:38

## 2023-12-22 SDOH — HEALTH STABILITY: MENTAL HEALTH: CURRENT SMOKER: 0

## 2023-12-22 ASSESSMENT — PAIN SCALES - GENERAL
PAINLEVEL_OUTOF10: 0 - NO PAIN
PAIN_LEVEL: 2
PAINLEVEL_OUTOF10: 0 - NO PAIN
PAINLEVEL_OUTOF10: 7

## 2023-12-22 ASSESSMENT — PAIN - FUNCTIONAL ASSESSMENT
PAIN_FUNCTIONAL_ASSESSMENT: 0-10

## 2023-12-22 NOTE — H&P
History Of Present Illness  Ronda Dean is a 43 y.o. female presenting with DEBRA.     Past Medical History  Past Medical History:   Diagnosis Date    Dysuria 05/08/2023    GERD (gastroesophageal reflux disease)     Hyperlipidemia, unspecified 04/14/2016    Dyslipidemia    Neuropathic pain of chest 05/08/2023    Pain of right breast 05/08/2023    Personal history of other diseases of the digestive system 07/07/2020    History of fatty infiltration of liver    TMJ (temporomandibular joint disorder)     Urinary urgency 05/08/2023    Vaginal discharge 05/08/2023       Surgical History  Past Surgical History:   Procedure Laterality Date    CERVICAL BIOPSY  W/ LOOP ELECTRODE EXCISION  04/16/2015    Cervical Loop Electrosurgical Excision (LEEP)    CHOLECYSTECTOMY  04/16/2015    Cholecystectomy Laparoscopic    HYSTERECTOMY  09/07/2021    Hysterectomy    OTHER SURGICAL HISTORY  07/08/2020    Exploratory laparotomy    OTHER SURGICAL HISTORY  07/08/2020    Umbilical hernia repair    OTHER SURGICAL HISTORY  07/08/2020    Tonsillectomy    OTHER SURGICAL HISTORY  02/16/2021    Esophagogastroduodenoscopy    OTHER SURGICAL HISTORY  02/16/2021    Colonoscopy    TOTAL ABDOMINAL HYSTERECTOMY  04/16/2015    Total Abdominal Hysterectomy        Social History  She reports that she has never smoked. She has never used smokeless tobacco. She reports that she does not drink alcohol and does not use drugs.    Family History  Family History   Problem Relation Name Age of Onset    Diabetes Father      Hypertension Father      Diabetes Father's Sister      Stomach cancer Father's Sister      Diabetes Paternal Grandmother      Hypertension Paternal Grandmother      Coronary artery disease Paternal Grandmother          MI>60s    Diabetes Paternal Cousin          Allergies  Pregabalin, Bupropion, Gabapentin, Hydrochlorothiazide, Naltrexone-bupropion, Ondansetron hcl, Promethazine, Trazodone, and Penicillins    Review of Systems   Genitourinary:          Stress incontinence   All other systems reviewed and are negative.       Physical Exam  Vitals reviewed.   Constitutional:       Appearance: Normal appearance.   HENT:      Head: Normocephalic and atraumatic.   Cardiovascular:      Rate and Rhythm: Normal rate.   Pulmonary:      Effort: Pulmonary effort is normal.   Abdominal:      General: Abdomen is flat.      Palpations: Abdomen is soft.   Skin:     General: Skin is warm and dry.   Neurological:      Mental Status: She is alert.          Last Recorded Vitals  There were no vitals taken for this visit.       Assessment/Plan   Principal Problem:    DEBRA (stress urinary incontinence, female)      Proceed with MUS, cysto   Informed consent performed  2g Ancef ppx ordered  SCDs  Discussed postop restrictions and medications      Julia Stapleton MD

## 2023-12-22 NOTE — ANESTHESIA POSTPROCEDURE EVALUATION
Patient: Ronda Dean    Procedure Summary       Date: 12/22/23 Room / Location: [facility] 03 / Virtual [facility]    Anesthesia Start: 1510 Anesthesia Stop: 1619    Procedure: sling placement Diagnosis:       DEBRA (stress urinary incontinence, female)      (DEBRA (stress urinary incontinence, female) [N39.3])    Surgeons: Johan Phillips MD Responsible Provider: LANDY Nettles    Anesthesia Type: general ASA Status: 2            Anesthesia Type: general    Vitals Value Taken Time   /73 12/22/23 1651   Temp 36.8 °C (98.3 °F) 12/22/23 1620   Pulse 90 12/22/23 1651   Resp 23 12/22/23 1651   SpO2 99 % 12/22/23 1651   Vitals shown include unvalidated device data.    Anesthesia Post Evaluation    Patient location during evaluation: PACU  Patient participation: complete - patient participated  Level of consciousness: awake  Pain score: 2  Pain management: adequate  Airway patency: patent  Cardiovascular status: acceptable  Respiratory status: acceptable  Hydration status: acceptable  Postoperative Nausea and Vomiting: none        No notable events documented.

## 2023-12-22 NOTE — ANESTHESIA PROCEDURE NOTES
Airway  Date/Time: 12/22/2023 3:42 PM  Urgency: elective    Airway not difficult    Staffing  Performed: CRNA   Authorized by: LANDY Nettles    Performed by: LANDY Nettles  Patient location during procedure: OR    Indications and Patient Condition  Indications for airway management: anesthesia  Spontaneous Ventilation: absent  Sedation level: deep  Preoxygenated: yes  Patient position: sniffing  Mask difficulty assessment: 0 - not attempted    Final Airway Details  Final airway type: supraglottic airway      Successful airway: Supraglottic airway: IGEL.  Size 4     Number of attempts at approach: 1  Ventilation between attempts: none  Number of other approaches attempted: 0

## 2023-12-22 NOTE — ANESTHESIA PREPROCEDURE EVALUATION
Patient: Ronda Dean    Procedure Information       Date/Time: 12/22/23 1430    Procedure: sling placement - <30 min    Location: [facility] / [facility]    Surgeons: Johan Phillips MD            Relevant Problems   Anesthesia (within normal limits)      Cardiovascular   (+) Chronic migraine   (+) Hyperlipidemia   (+) Mixed hyperlipidemia      GI   (+) Irritable bowel syndrome      /Renal   (+) Nonalcoholic fatty liver disease      Neuro/Psych   (+) Chronic migraine   (+) Depressive disorder   (+) Herniation of cervical intervertebral disc with radiculopathy   (+) Lumbar radiculopathy      Pulmonary   (+) Obstructive sleep apnea syndrome      GI/Hepatic   (+) Nonalcoholic fatty liver disease      Musculoskeletal   (+) Lumbar spondylosis   (+) Spinal stenosis of cervical region      Other   (+) Arthritis of right acromioclavicular joint       Clinical information reviewed:   Tobacco  Allergies  Meds   Med Hx  Surg Hx   Fam Hx  Soc Hx        NPO Detail:  NPO/Void Status  Date of Last Liquid: 12/21/23  Time of Last Liquid: 1900  Date of Last Solid: 12/21/23  Time of Last Solid: 1700         Physical Exam    Airway  Mallampati: III  TM distance: <3 FB     Cardiovascular - normal exam     Dental - normal exam     Pulmonary - normal exam     Abdominal - normal exam             Anesthesia Plan    ASA 2     general     The patient is not a current smoker.    intravenous induction   Postoperative administration of opioids is intended.  Anesthetic plan and risks discussed with patient.  Use of blood products discussed with patient who.    Plan discussed with CRNA.

## 2023-12-22 NOTE — OP NOTE
sling placement Operative Note     Date: 2023  OR Location: [facility]    Name: Ronda Dean, : 1980, Age: 43 y.o., MRN: [id number], Sex: female    Diagnosis  Pre-op Diagnosis     * DEBRA (stress urinary incontinence, female) [N39.3] Post-op Diagnosis     * DEBRA (stress urinary incontinence, female) [N39.3]     Procedures  sling placement  [address] - [place] SLING OPERATION STRESS INCONTINENCE      Surgeons      * Johan Phillips - Primary    Resident/Fellow/Other Assistant:  Surgeon(s) and Role:    Procedure Summary  Anesthesia: * No anesthesia type entered *  ASA: II  Anesthesia Staff: CRNA: MILAGRO Nettles-CRNA  Estimated Blood Loss: 20mL  Intra-op Medications:   Medication Name Total Dose   BUPivacaine HCl (Marcaine) 0.25 % (2.5 mg/mL) injection 16 mL   lactated Ringer's infusion Cannot be calculated   ceFAZolin in dextrose (iso-os) (Ancef) IVPB 2 g 2 g              Anesthesia Record               Intraprocedure I/O Totals          Intake    lactated Ringer's infusion 600.00 mL    ceFAZolin in dextrose (iso-os) (Ancef) IVPB 2 g 100.00 mL    Total Intake 700 mL          Specimen: No specimens collected     Staff:   Circulator: Susan Bartholomew RN  Scrub Person: Radha Grant         Drains and/or Catheters:  16 Fr correia catheter      Implants: Desara mid urethral sling    Findings: normal bladder mucosa    Indications: Ronda Dean is an 43 y.o. female who is having surgery for DEBRA (stress urinary incontinence, female) [N39.3].     The patient was seen in the preoperative area. The risks, benefits, complications, treatment options, non-operative alternatives, expected recovery and outcomes were discussed with the patient. The possibilities of reaction to medication, pulmonary aspiration, injury to surrounding structures, bleeding, recurrent infection, the need for additional procedures, failure to diagnose a condition, and creating a complication requiring transfusion or operation were discussed with the patient.  The patient concurred with the proposed plan, giving informed consent.  The site of surgery was properly noted/marked if necessary per policy. The patient has been actively warmed in preoperative area. Preoperative antibiotics have been ordered and given within 1 hours of incision. Venous thrombosis prophylaxis have been ordered including bilateral sequential compression devices    Procedure Details: The patient was interviewed in the preop holding area by the surgical team, where the risks, benefits, and indication of the procedure were reviewed.  She was then transferred to the operating suite where the patient was properly identified and the procedure was confirmed. When adequate anesthesia was obtained, the patient was prepped and draped in a dorsal lithotomy position utilizing yellow fin stirrups. A time-out was performed. Preoperative antibiotics were given. A correia catheter was inserted under sterile conditions and the bladder was drained.    The anterior vaginal wall was infiltrated with lidocaine 1% with epinephrine and a superficial incision starting approximately 1 cm inferior to the urethral meatus was carried out approximately 1.5 cm with a knife. From superficial to deep, the anterior vaginal wall was then dissected from the tissue surrounding the urethra until the pubic bone was reached. This procedure was repeated on the opposite side. The Desara sling trocars were then inserted through the dissected space in the vagina and through the mons pubis bilaterally. A cystoscopy was performed and we visualized the trocars within the bladder bilaterally. The bladder was drained and the trocars were repositioned. Cystoscopy was repeated and no defects were noted within the bladder wall. The mesh sling was then pulled through the dissected space. After the sling was tensioned appropriately, the remainder of the mesh protruding from the mons pubis was cut, leaving just two puncture holes on the mons pubis. The  anterior vaginal wall was then closed with a 0 vicryl suture in a running fashion. Dermabond was placed over the puncture holes located on the mons pubis.    The patient was then awakened from anesthesia, having tolerated procedure well, and was taken to the recovery room in stable condition. All sponge, needle, and instrument counts were correct at the end the case.    Complications:  None; patient tolerated the procedure well.    Disposition: PACU - hemodynamically stable.  Condition: stable         Additional Details: Patient will undergo voiding trial in PACU.     Attending Attestation: I was present and scrubbed for the key portions of the procedure.    Johan Phillips  Phone Number: [phone]

## 2023-12-22 NOTE — DISCHARGE INSTRUCTIONS
Call Dr. Phillips's office for any problems and/or concerns    *Walk as much as possible, it is ok to use stairs carefully  *Ok to shower, avoid soaking in tub baths or swimming for 4-6 weeks after surgery   *Continue the coughing and deep breathing exercises that your learned in the hospital  *No lifting/straining and avoid constipation for 4-6 weeks (Avoid strenuous activity)  *Prevent constipation by using stool softeners and staying hydrated, so that you do not strain against your stitches or have pain from constipation  *Vaginal rest for 6 weeks (Do not put anything in your vagina except prescribed vaginal estrogen. Do not use tampons or douches. Do not have sex until cleared by physician)    Call Doctor right away for:    *Fever above 100.4/shaking chills  *Bright red vaginal bleeding or bleeding that soaks more than one sanitary pad per hour  *A foul smelling discharge from the vagina  *Trouble urinating or burning with urination  *Severe pain or bloating in your abdomen  *Persistent nausea/vomiting  *Redness, swelling, or drainage at your incision sites  *Chest pain/shortness of breath-call 911.    - You will be going home with prescriptions for pain medication. If you are able to take them, alternate a dose of Acetaminophen (Tylenol) and Ketorolac(Toraol) every 3 hours. (For example, 1000mg of Tylenol at 09:00am, 10mg Toradol at 12:00pm, 1000mg of Tylenol at 3:00pm, 10mg Toradol at 6:00pm).   - Use any prescribed narcotic (ie Tramadol) for breakthrough pain as prescribed.   - Use a stool softener, such as Miralax, to prevent constipation from the narcotic medication.

## 2023-12-29 ENCOUNTER — APPOINTMENT (OUTPATIENT)
Dept: GASTROENTEROLOGY | Facility: CLINIC | Age: 43
End: 2023-12-29
Payer: COMMERCIAL

## 2024-01-03 ENCOUNTER — APPOINTMENT (OUTPATIENT)
Dept: ORTHOPEDIC SURGERY | Facility: CLINIC | Age: 44
End: 2024-01-03
Payer: COMMERCIAL

## 2024-01-05 ENCOUNTER — OFFICE VISIT (OUTPATIENT)
Dept: UROLOGY | Facility: CLINIC | Age: 44
End: 2024-01-05
Payer: COMMERCIAL

## 2024-01-05 DIAGNOSIS — R30.0 DYSURIA: Primary | ICD-10-CM

## 2024-01-05 PROCEDURE — 1036F TOBACCO NON-USER: CPT | Performed by: STUDENT IN AN ORGANIZED HEALTH CARE EDUCATION/TRAINING PROGRAM

## 2024-01-05 PROCEDURE — 87086 URINE CULTURE/COLONY COUNT: CPT

## 2024-01-05 PROCEDURE — 99024 POSTOP FOLLOW-UP VISIT: CPT | Performed by: STUDENT IN AN ORGANIZED HEALTH CARE EDUCATION/TRAINING PROGRAM

## 2024-01-05 RX ORDER — SULFAMETHOXAZOLE AND TRIMETHOPRIM 800; 160 MG/1; MG/1
1 TABLET ORAL 2 TIMES DAILY
Qty: 14 TABLET | Refills: 0 | Status: SHIPPED | OUTPATIENT
Start: 2024-01-05 | End: 2024-01-12

## 2024-01-05 NOTE — PROGRESS NOTES
HISTORY OF PRESENT ILLNESS:  Today she presents with complaints of burning with urination, pain to the point of screaming. It has since decreased to a tolerable level. She has no issues emptying her bladder. But she reports increased urinary frequency, urgency, as well as cramping.   Patient reports penicillin allergy.         Past Medical History  She has a past medical history of Dysuria (05/08/2023), GERD (gastroesophageal reflux disease), Hyperlipidemia, unspecified (04/14/2016), Neuropathic pain of chest (05/08/2023), Pain of right breast (05/08/2023), Personal history of other diseases of the digestive system (07/07/2020), TMJ (temporomandibular joint disorder), Urinary urgency (05/08/2023), and Vaginal discharge (05/08/2023).    Surgical History  She has a past surgical history that includes Total abdominal hysterectomy (04/16/2015); Cervical biopsy w/ loop electrode excision (04/16/2015); Cholecystectomy (04/16/2015); Hysterectomy (09/07/2021); Other surgical history (07/08/2020); Other surgical history (07/08/2020); Other surgical history (07/08/2020); Other surgical history (02/16/2021); and Other surgical history (02/16/2021).     Social History  She reports that she has never smoked. She has never used smokeless tobacco. She reports that she does not drink alcohol and does not use drugs.    Family History  Family History   Problem Relation Name Age of Onset    Diabetes Father      Hypertension Father      Diabetes Father's Sister      Stomach cancer Father's Sister      Diabetes Paternal Grandmother      Hypertension Paternal Grandmother      Coronary artery disease Paternal Grandmother          MI>60s    Diabetes Paternal Cousin          Allergies  Pregabalin, Bupropion, Gabapentin, Hydrochlorothiazide, Naltrexone-bupropion, Ondansetron hcl, Promethazine, Trazodone, and Penicillins      A comprehensive 10+ review of systems was negative except for: see hpi                          PHYSICAL  "EXAMINATION:  BP Readings from Last 3 Encounters:   12/22/23 112/78   12/05/23 121/84   11/07/23 116/72      Wt Readings from Last 3 Encounters:   12/20/23 72.6 kg (160 lb)   12/05/23 74.4 kg (164 lb)   11/07/23 75.8 kg (167 lb)      BMI: Estimated body mass index is 25.82 kg/m² as calculated from the following:    Height as of 12/20/23: 1.676 m (5' 6\").    Weight as of 12/20/23: 72.6 kg (160 lb).  BSA: Estimated body surface area is 1.84 meters squared as calculated from the following:    Height as of 12/20/23: 1.676 m (5' 6\").    Weight as of 12/20/23: 72.6 kg (160 lb).  HEENT: Normocephalic, atraumatic, PER EOMI, nonicteric, trachea normal, thyroid normal, oropharynx normal.  CARDIAC: regular rate & rhythm, S1 & S2 normal.  No heaves, thrills, gallops or murmurs.  LUNGS: Clear to auscultation, no spinal or CV tenderness.  EXTREMITIES: No evidence of cyanosis, clubbing or edema.        Assessment:    43-year-old  with dyspareunia, mixed incontinence, hypertonic pelvic floor     Stress  incontinence  s/p sling placement 12/22/2023      Ordered urine culture.  Prescribed bactrim.  Follow-up in 4 weeks.    Johan Phillips MD       Scribe Attestation  By signing my name below, I, Vicky Cisneros attest that this documentation has been prepared under the direction and in the presence of Johan Phillips MD. All medical record entries made by the Scribe were at my direction or personally dictated by me. I have reviewed the chart and agree that the record accurately reflects my personal performance of the history, physical exam, discussion and plan.    "

## 2024-01-08 LAB — BACTERIA UR CULT: NO GROWTH

## 2024-01-12 ENCOUNTER — APPOINTMENT (OUTPATIENT)
Dept: GASTROENTEROLOGY | Facility: CLINIC | Age: 44
End: 2024-01-12
Payer: COMMERCIAL

## 2024-01-15 ENCOUNTER — CLINICAL SUPPORT (OUTPATIENT)
Dept: GASTROENTEROLOGY | Facility: CLINIC | Age: 44
End: 2024-01-15
Payer: COMMERCIAL

## 2024-01-15 DIAGNOSIS — R74.8 ELEVATED LIVER ENZYMES: ICD-10-CM

## 2024-01-15 DIAGNOSIS — K76.0 FATTY LIVER: ICD-10-CM

## 2024-01-15 PROCEDURE — 91200 LIVER ELASTOGRAPHY: CPT | Performed by: INTERNAL MEDICINE

## 2024-01-17 DIAGNOSIS — K76.0 FATTY LIVER: ICD-10-CM

## 2024-01-25 ENCOUNTER — OFFICE VISIT (OUTPATIENT)
Dept: ORTHOPEDIC SURGERY | Facility: CLINIC | Age: 44
End: 2024-01-25
Payer: COMMERCIAL

## 2024-01-25 ENCOUNTER — PREP FOR PROCEDURE (OUTPATIENT)
Dept: ORTHOPEDIC SURGERY | Facility: CLINIC | Age: 44
End: 2024-01-25

## 2024-01-25 VITALS — BODY MASS INDEX: 25.71 KG/M2 | HEIGHT: 66 IN | WEIGHT: 160 LBS

## 2024-01-25 DIAGNOSIS — M25.731 CARPOMETACARPAL BOSS, RIGHT: Primary | ICD-10-CM

## 2024-01-25 DIAGNOSIS — G56.01 CARPAL TUNNEL SYNDROME ON RIGHT: ICD-10-CM

## 2024-01-25 PROCEDURE — 99214 OFFICE O/P EST MOD 30 MIN: CPT | Performed by: ORTHOPAEDIC SURGERY

## 2024-01-25 PROCEDURE — 1036F TOBACCO NON-USER: CPT | Performed by: ORTHOPAEDIC SURGERY

## 2024-01-25 NOTE — PROGRESS NOTES
Follow up Bilateral carpal tunnel   Right carpal tunnel injection one on 12/20/2023 - pain has increased  Discuss Surgery

## 2024-01-25 NOTE — PROGRESS NOTES
43 y.o. female presents today for follow up of bilateral hand numbness, tingling, weakness and pain. The patient reports symptoms for months, getting worse.  Pain is controlled.  Reports no previous surgeries or trauma to the area.  Reports pain worse with use, better at rest.  Pain numb and tingly, wakes them from sleep.   Worse driving a car and talking on a phone.  Also complains of right dorsal hand pain over 2nd CMC.  Worse typing.  Brace helps some but not completely. Got EMG.  Had a shot that helped 1 month ago but symptoms returning.  States would like surgery.    Review of Systems    Constitutional: no fever, no chills, not feeling tired, no recent weight gain and no recent weight loss.   ENT: no nosebleeds.   Cardiovascular: no chest pain.   Respiratory: no shortness of breath and no cough.   Gastrointestinal: no abdominal pain, no nausea, no vomiting and no diarrhea.   Musculoskeletal: per HPI  Integumentary: no rashes and no skin wound.   Neurological: no headache.   Psychiatric: no depression and no sleep disturbances.   Endocrine: no muscle weakness and no muscle cramps.   Hematologic/Lymphatic: no swollen glands and no tendency for easy bruising.     All other systems have been reviewed and are negative for complaint    Patient's past medical history, past surgical history, allergies, and medications have been reviewed unless otherwise noted in the chart.     Carpal Tunnel Exam  Inspection:  no evidence of infection, no edema, no erythema, no ecchymosis, Palpation:  compartments are soft, no pain with palpation, Range of Motion:  full wrist and finger range of motion, Stability:  no wrist instability detected, Strength:  5/5 APB and intrinsics, Skin:  intact, Vascular:  capillary refill <2 seconds distally, Sensation:  decreased in the median nerve distribution, Test:  positive Tinel's at the Carpal Tunnel, positive Direct Carpal Tunnel Compression Test       Mass  Location:  Right dorsal 2nd CMC  Specific:  CMC boss Inspection (mass size):  5 mm x 5 mm Palpation:  firm, nonmobile, noncompressible, TTP 2nd CMC Range of Motion:  full wrist and finger motion, Stability:  no instability noted, Strength:  5/5 hand and wrist, Skin:  intact, Vascular:  capillary refill <2 seconds distally, Sensation:  sensation intact to light touch distally.       Constitutional   General appearance: Alert and in no acute distress. Well developed, well nourished.    Eyes   External Eye, Conjunctiva and lids: Normal external exam - pupils were equal in size, round, reactive to light (PERRL) with normal accommodation and extraocular movements intact (EOMI).   Ears, Nose, Mouth, and Throat   Hearing: Normal.   Neck   Neck: No neck mass was observed. Supple.   Pulmonary   Respiratory effort: No respiratory distress.   Auscultation of lungs: Clear bilateral breath sounds.   Cardiovascular   Examination of extremities: No peripheral edema.   Auscultation of heart: Heart rate and rhythm were normal   Psychiatric   Judgment and insight: Intact.   Orientation to person, place, and time: Alert and oriented x 3.       Mood and affect: Normal.      XR - no fractures, dislocations, ulnar negative bilaterally    EMG - normal    Bilateral CTS, right 2nd CMC boss  Surgery discussion: I discussed the diagnosis and treatment options with the patient today along with their associated risks and benefits. After thorough discussion, the patient has elected to proceed with surgical intervention. The patient understands the risks of,including, but not limited to, bleeding, infection, loss of life or limb, pain, permanent numbness, tingling, weakness, loss of motion, failure of the goals of surgery or the potential need for additional surgery. The patient would like to accept these risks and proceed. All questions were answered to the patients satisfaction and the patient seems satisfied with the plan.    Plan right ECTR and right 2nd CMC boss excision -  patient had a steroid shot about 1 month ago that she states alleviated her numbness and tingling but it has since returned.  States she is having a lot of night pain that can wake her from sleep and pain working on the computer.  We have tried conservative care but her symptoms persist.  I think it is reasonable to pursue surgical intervention since the patient would like further intervention.  FU 2 weeks after - No XR

## 2024-02-02 ENCOUNTER — OFFICE VISIT (OUTPATIENT)
Dept: UROLOGY | Facility: CLINIC | Age: 44
End: 2024-02-02
Payer: COMMERCIAL

## 2024-02-02 ENCOUNTER — LAB (OUTPATIENT)
Dept: LAB | Facility: LAB | Age: 44
End: 2024-02-02
Payer: COMMERCIAL

## 2024-02-02 DIAGNOSIS — E78.5 HYPERLIPIDEMIA, UNSPECIFIED HYPERLIPIDEMIA TYPE: ICD-10-CM

## 2024-02-02 DIAGNOSIS — Z09 POSTOP CHECK: Primary | ICD-10-CM

## 2024-02-02 LAB
ALBUMIN SERPL BCP-MCNC: 4.6 G/DL (ref 3.4–5)
ALP SERPL-CCNC: 72 U/L (ref 33–110)
ALT SERPL W P-5'-P-CCNC: 31 U/L (ref 7–45)
ANION GAP SERPL CALC-SCNC: 13 MMOL/L (ref 10–20)
AST SERPL W P-5'-P-CCNC: 22 U/L (ref 9–39)
BILIRUB SERPL-MCNC: 1.7 MG/DL (ref 0–1.2)
BUN SERPL-MCNC: 16 MG/DL (ref 6–23)
CALCIUM SERPL-MCNC: 9.4 MG/DL (ref 8.6–10.3)
CHLORIDE SERPL-SCNC: 108 MMOL/L (ref 98–107)
CHOLEST SERPL-MCNC: 226 MG/DL (ref 0–199)
CHOLESTEROL/HDL RATIO: 4.1
CO2 SERPL-SCNC: 24 MMOL/L (ref 21–32)
CREAT SERPL-MCNC: 0.95 MG/DL (ref 0.5–1.05)
EGFRCR SERPLBLD CKD-EPI 2021: 77 ML/MIN/1.73M*2
GLUCOSE SERPL-MCNC: 71 MG/DL (ref 74–99)
HDLC SERPL-MCNC: 54.9 MG/DL
LDLC SERPL CALC-MCNC: 147 MG/DL
NON HDL CHOLESTEROL: 171 MG/DL (ref 0–149)
POTASSIUM SERPL-SCNC: 4.2 MMOL/L (ref 3.5–5.3)
PROT SERPL-MCNC: 6.7 G/DL (ref 6.4–8.2)
SODIUM SERPL-SCNC: 141 MMOL/L (ref 136–145)
TRIGL SERPL-MCNC: 122 MG/DL (ref 0–149)
VLDL: 24 MG/DL (ref 0–40)

## 2024-02-02 PROCEDURE — 1036F TOBACCO NON-USER: CPT | Performed by: STUDENT IN AN ORGANIZED HEALTH CARE EDUCATION/TRAINING PROGRAM

## 2024-02-02 PROCEDURE — 99024 POSTOP FOLLOW-UP VISIT: CPT | Performed by: STUDENT IN AN ORGANIZED HEALTH CARE EDUCATION/TRAINING PROGRAM

## 2024-02-02 PROCEDURE — 36415 COLL VENOUS BLD VENIPUNCTURE: CPT

## 2024-02-02 PROCEDURE — 80061 LIPID PANEL: CPT

## 2024-02-02 PROCEDURE — 80053 COMPREHEN METABOLIC PANEL: CPT

## 2024-02-02 NOTE — PROGRESS NOTES
HISTORY OF PRESENT ILLNESS:  Patient is doing much better today.  Pain has resolved.  She does not complain of stress incontinence.  She is complaining of urgency this is better than before.  But she is still experiencing up to 6 times a day.  She has no incontinence at all.             Past Medical History  She has a past medical history of Dysuria (05/08/2023), GERD (gastroesophageal reflux disease), Hyperlipidemia, unspecified (04/14/2016), Neuropathic pain of chest (05/08/2023), Pain of right breast (05/08/2023), Personal history of other diseases of the digestive system (07/07/2020), TMJ (temporomandibular joint disorder), Urinary urgency (05/08/2023), and Vaginal discharge (05/08/2023).    Surgical History  She has a past surgical history that includes Total abdominal hysterectomy (04/16/2015); Cervical biopsy w/ loop electrode excision (04/16/2015); Cholecystectomy (04/16/2015); Hysterectomy (09/07/2021); Other surgical history (07/08/2020); Other surgical history (07/08/2020); Other surgical history (07/08/2020); Other surgical history (02/16/2021); and Other surgical history (02/16/2021).     Social History  She reports that she has never smoked. She has never used smokeless tobacco. She reports that she does not drink alcohol and does not use drugs.    Family History  Family History   Problem Relation Name Age of Onset    Diabetes Father      Hypertension Father      Diabetes Father's Sister      Stomach cancer Father's Sister      Diabetes Paternal Grandmother      Hypertension Paternal Grandmother      Coronary artery disease Paternal Grandmother          MI>60s    Diabetes Paternal Cousin          Allergies  Pregabalin, Bupropion, Gabapentin, Hydrochlorothiazide, Naltrexone-bupropion, Ondansetron hcl, Promethazine, Trazodone, and Penicillins      A comprehensive 10+ review of systems was negative except for: see hpi                          PHYSICAL EXAMINATION:  BP Readings from Last 3  "Encounters:   12/22/23 112/78   12/05/23 121/84   11/07/23 116/72      Wt Readings from Last 3 Encounters:   01/25/24 72.6 kg (160 lb)   12/20/23 72.6 kg (160 lb)   12/05/23 74.4 kg (164 lb)      BMI: Estimated body mass index is 25.82 kg/m² as calculated from the following:    Height as of 1/25/24: 1.676 m (5' 6\").    Weight as of 1/25/24: 72.6 kg (160 lb).  BSA: Estimated body surface area is 1.84 meters squared as calculated from the following:    Height as of 1/25/24: 1.676 m (5' 6\").    Weight as of 1/25/24: 72.6 kg (160 lb).  HEENT: Normocephalic, atraumatic, PER EOMI, nonicteric, trachea normal, thyroid normal, oropharynx normal.  CARDIAC: regular rate & rhythm, S1 & S2 normal.  No heaves, thrills, gallops or murmurs.  LUNGS: Clear to auscultation, no spinal or CV tenderness.  EXTREMITIES: No evidence of cyanosis, clubbing or edema.                   Assessment:  43-year-old  with dyspareunia, mixed incontinence, hypertonic pelvic floor     Stress  incontinence  s/p sling placement 12/22/2023     Doing well    Urgency:   we will discuss pelvic floor therapy if persists  Follow-up in 3 months         Johan Phillips MD    "

## 2024-02-16 ENCOUNTER — ANESTHESIA EVENT (OUTPATIENT)
Dept: OPERATING ROOM | Facility: HOSPITAL | Age: 44
End: 2024-02-16
Payer: COMMERCIAL

## 2024-02-19 ENCOUNTER — APPOINTMENT (OUTPATIENT)
Dept: ORTHOPEDIC SURGERY | Facility: CLINIC | Age: 44
End: 2024-02-19
Payer: COMMERCIAL

## 2024-02-20 ENCOUNTER — PHARMACY VISIT (OUTPATIENT)
Dept: PHARMACY | Facility: CLINIC | Age: 44
End: 2024-02-20

## 2024-02-20 ENCOUNTER — ANESTHESIA (OUTPATIENT)
Dept: OPERATING ROOM | Facility: HOSPITAL | Age: 44
End: 2024-02-20
Payer: COMMERCIAL

## 2024-02-20 ENCOUNTER — APPOINTMENT (OUTPATIENT)
Dept: RADIOLOGY | Facility: HOSPITAL | Age: 44
End: 2024-02-20
Payer: COMMERCIAL

## 2024-02-20 ENCOUNTER — HOSPITAL ENCOUNTER (OUTPATIENT)
Facility: HOSPITAL | Age: 44
Setting detail: OUTPATIENT SURGERY
Discharge: HOME | End: 2024-02-20
Attending: ORTHOPAEDIC SURGERY | Admitting: ORTHOPAEDIC SURGERY
Payer: COMMERCIAL

## 2024-02-20 VITALS
SYSTOLIC BLOOD PRESSURE: 131 MMHG | RESPIRATION RATE: 20 BRPM | HEIGHT: 66 IN | WEIGHT: 160 LBS | HEART RATE: 66 BPM | DIASTOLIC BLOOD PRESSURE: 88 MMHG | TEMPERATURE: 97 F | OXYGEN SATURATION: 99 % | BODY MASS INDEX: 25.71 KG/M2

## 2024-02-20 DIAGNOSIS — G56.01 CARPAL TUNNEL SYNDROME ON RIGHT: Primary | ICD-10-CM

## 2024-02-20 PROCEDURE — 76000 FLUOROSCOPY <1 HR PHYS/QHP: CPT | Mod: CCI

## 2024-02-20 PROCEDURE — 26230 PARTIAL REMOVAL OF HAND BONE: CPT | Performed by: ORTHOPAEDIC SURGERY

## 2024-02-20 PROCEDURE — 29848 WRIST ENDOSCOPY/SURGERY: CPT | Performed by: ORTHOPAEDIC SURGERY

## 2024-02-20 PROCEDURE — 3600000008 HC OR TIME - EACH INCREMENTAL 1 MINUTE - PROCEDURE LEVEL THREE: Performed by: ORTHOPAEDIC SURGERY

## 2024-02-20 PROCEDURE — 7100000009 HC PHASE TWO TIME - INITIAL BASE CHARGE: Performed by: ORTHOPAEDIC SURGERY

## 2024-02-20 PROCEDURE — 3600000003 HC OR TIME - INITIAL BASE CHARGE - PROCEDURE LEVEL THREE: Performed by: ORTHOPAEDIC SURGERY

## 2024-02-20 PROCEDURE — RXMED WILLOW AMBULATORY MEDICATION CHARGE

## 2024-02-20 PROCEDURE — 2720000007 HC OR 272 NO HCPCS: Performed by: ORTHOPAEDIC SURGERY

## 2024-02-20 PROCEDURE — 2500000004 HC RX 250 GENERAL PHARMACY W/ HCPCS (ALT 636 FOR OP/ED): Performed by: ANESTHESIOLOGY

## 2024-02-20 PROCEDURE — 7100000001 HC RECOVERY ROOM TIME - INITIAL BASE CHARGE: Performed by: ORTHOPAEDIC SURGERY

## 2024-02-20 PROCEDURE — 2500000004 HC RX 250 GENERAL PHARMACY W/ HCPCS (ALT 636 FOR OP/ED): Performed by: NURSE ANESTHETIST, CERTIFIED REGISTERED

## 2024-02-20 PROCEDURE — 7100000010 HC PHASE TWO TIME - EACH INCREMENTAL 1 MINUTE: Performed by: ORTHOPAEDIC SURGERY

## 2024-02-20 PROCEDURE — 7100000002 HC RECOVERY ROOM TIME - EACH INCREMENTAL 1 MINUTE: Performed by: ORTHOPAEDIC SURGERY

## 2024-02-20 PROCEDURE — 3700000001 HC GENERAL ANESTHESIA TIME - INITIAL BASE CHARGE: Performed by: ORTHOPAEDIC SURGERY

## 2024-02-20 PROCEDURE — 2500000005 HC RX 250 GENERAL PHARMACY W/O HCPCS: Performed by: ANESTHESIOLOGY

## 2024-02-20 PROCEDURE — 3700000002 HC GENERAL ANESTHESIA TIME - EACH INCREMENTAL 1 MINUTE: Performed by: ORTHOPAEDIC SURGERY

## 2024-02-20 PROCEDURE — 2500000005 HC RX 250 GENERAL PHARMACY W/O HCPCS: Performed by: ORTHOPAEDIC SURGERY

## 2024-02-20 RX ORDER — PROPOFOL 10 MG/ML
INJECTION, EMULSION INTRAVENOUS CONTINUOUS PRN
Status: DISCONTINUED | OUTPATIENT
Start: 2024-02-20 | End: 2024-02-20

## 2024-02-20 RX ORDER — LIDOCAINE HYDROCHLORIDE AND EPINEPHRINE 20; 10 MG/ML; UG/ML
INJECTION, SOLUTION INFILTRATION; PERINEURAL AS NEEDED
Status: DISCONTINUED | OUTPATIENT
Start: 2024-02-20 | End: 2024-02-20 | Stop reason: HOSPADM

## 2024-02-20 RX ORDER — PROPOFOL 10 MG/ML
INJECTION, EMULSION INTRAVENOUS AS NEEDED
Status: DISCONTINUED | OUTPATIENT
Start: 2024-02-20 | End: 2024-02-20

## 2024-02-20 RX ORDER — FAMOTIDINE 10 MG/ML
20 INJECTION INTRAVENOUS ONCE
Status: COMPLETED | OUTPATIENT
Start: 2024-02-20 | End: 2024-02-20

## 2024-02-20 RX ORDER — ALBUTEROL SULFATE 0.83 MG/ML
2.5 SOLUTION RESPIRATORY (INHALATION) ONCE
Status: DISCONTINUED | OUTPATIENT
Start: 2024-02-20 | End: 2024-02-20 | Stop reason: HOSPADM

## 2024-02-20 RX ORDER — SCOLOPAMINE TRANSDERMAL SYSTEM 1 MG/1
1 PATCH, EXTENDED RELEASE TRANSDERMAL
Status: DISCONTINUED | OUTPATIENT
Start: 2024-02-20 | End: 2024-02-20 | Stop reason: HOSPADM

## 2024-02-20 RX ORDER — HYDROCODONE BITARTRATE AND ACETAMINOPHEN 5; 325 MG/1; MG/1
1 TABLET ORAL EVERY 6 HOURS PRN
Qty: 21 TABLET | Refills: 0 | Status: SHIPPED | OUTPATIENT
Start: 2024-02-20 | End: 2024-04-08 | Stop reason: ALTCHOICE

## 2024-02-20 RX ORDER — CEFAZOLIN SODIUM 2 G/100ML
2 INJECTION, SOLUTION INTRAVENOUS EVERY 8 HOURS
Status: DISCONTINUED | OUTPATIENT
Start: 2024-02-20 | End: 2024-02-20 | Stop reason: HOSPADM

## 2024-02-20 RX ORDER — SODIUM CHLORIDE, SODIUM LACTATE, POTASSIUM CHLORIDE, CALCIUM CHLORIDE 600; 310; 30; 20 MG/100ML; MG/100ML; MG/100ML; MG/100ML
50 INJECTION, SOLUTION INTRAVENOUS CONTINUOUS
Status: DISCONTINUED | OUTPATIENT
Start: 2024-02-20 | End: 2024-02-20 | Stop reason: HOSPADM

## 2024-02-20 RX ORDER — CHOLECALCIFEROL (VITAMIN D3) 50 MCG
50 TABLET ORAL DAILY
COMMUNITY

## 2024-02-20 RX ORDER — MIDAZOLAM HYDROCHLORIDE 1 MG/ML
INJECTION, SOLUTION INTRAMUSCULAR; INTRAVENOUS AS NEEDED
Status: DISCONTINUED | OUTPATIENT
Start: 2024-02-20 | End: 2024-02-20

## 2024-02-20 RX ORDER — CEFAZOLIN SODIUM 2 G/100ML
INJECTION, SOLUTION INTRAVENOUS AS NEEDED
Status: DISCONTINUED | OUTPATIENT
Start: 2024-02-20 | End: 2024-02-20

## 2024-02-20 RX ORDER — KETOROLAC TROMETHAMINE 30 MG/ML
INJECTION, SOLUTION INTRAMUSCULAR; INTRAVENOUS AS NEEDED
Status: DISCONTINUED | OUTPATIENT
Start: 2024-02-20 | End: 2024-02-20

## 2024-02-20 RX ORDER — FENTANYL CITRATE 50 UG/ML
INJECTION, SOLUTION INTRAMUSCULAR; INTRAVENOUS AS NEEDED
Status: DISCONTINUED | OUTPATIENT
Start: 2024-02-20 | End: 2024-02-20

## 2024-02-20 RX ORDER — BUPIVACAINE HCL/EPINEPHRINE 0.5-1:200K
VIAL (ML) INJECTION AS NEEDED
Status: DISCONTINUED | OUTPATIENT
Start: 2024-02-20 | End: 2024-02-20 | Stop reason: HOSPADM

## 2024-02-20 RX ADMIN — PROPOFOL 40 MG: 10 INJECTION, EMULSION INTRAVENOUS at 09:43

## 2024-02-20 RX ADMIN — MIDAZOLAM 2 MG: 1 INJECTION INTRAMUSCULAR; INTRAVENOUS at 09:40

## 2024-02-20 RX ADMIN — Medication: at 08:00

## 2024-02-20 RX ADMIN — FAMOTIDINE 20 MG: 10 INJECTION, SOLUTION INTRAVENOUS at 08:36

## 2024-02-20 RX ADMIN — PROPOFOL 100 MCG/KG/MIN: 10 INJECTION, EMULSION INTRAVENOUS at 09:43

## 2024-02-20 RX ADMIN — KETOROLAC TROMETHAMINE 30 MG: 30 INJECTION, SOLUTION INTRAMUSCULAR at 09:56

## 2024-02-20 RX ADMIN — CEFAZOLIN SODIUM 2 G: 2 INJECTION, SOLUTION INTRAVENOUS at 09:40

## 2024-02-20 RX ADMIN — SCOPOLAMINE 1 PATCH: 1.5 PATCH, EXTENDED RELEASE TRANSDERMAL at 08:55

## 2024-02-20 RX ADMIN — FENTANYL CITRATE 50 MCG: 50 INJECTION INTRAMUSCULAR; INTRAVENOUS at 09:43

## 2024-02-20 RX ADMIN — SODIUM CHLORIDE, POTASSIUM CHLORIDE, SODIUM LACTATE AND CALCIUM CHLORIDE 50 ML/HR: 600; 310; 30; 20 INJECTION, SOLUTION INTRAVENOUS at 08:36

## 2024-02-20 SDOH — HEALTH STABILITY: MENTAL HEALTH: CURRENT SMOKER: 0

## 2024-02-20 ASSESSMENT — PAIN SCALES - GENERAL
PAINLEVEL_OUTOF10: 0 - NO PAIN
PAINLEVEL_OUTOF10: 2
PAINLEVEL_OUTOF10: 0 - NO PAIN
PAIN_LEVEL: 0

## 2024-02-20 ASSESSMENT — PAIN - FUNCTIONAL ASSESSMENT
PAIN_FUNCTIONAL_ASSESSMENT: 0-10

## 2024-02-20 ASSESSMENT — COLUMBIA-SUICIDE SEVERITY RATING SCALE - C-SSRS
1. IN THE PAST MONTH, HAVE YOU WISHED YOU WERE DEAD OR WISHED YOU COULD GO TO SLEEP AND NOT WAKE UP?: NO
2. HAVE YOU ACTUALLY HAD ANY THOUGHTS OF KILLING YOURSELF?: NO
6. HAVE YOU EVER DONE ANYTHING, STARTED TO DO ANYTHING, OR PREPARED TO DO ANYTHING TO END YOUR LIFE?: NO

## 2024-02-20 NOTE — ANESTHESIA PREPROCEDURE EVALUATION
Patient: Ronda Dean    Procedure Information       Date/Time: 02/20/24 0925    Procedures:       right endoscopic carpal tunnel release right 2nd metacarpal BOSS excision( MAC/LOCAL) RONGEUR BONE WAX (Right: Wrist) - local / mac 15minut procedure no special equipment      . (Right: Hand) - local / mac 15minut procedure no special equipment    Location: [facility] / Virtual [facility]    Surgeons: Andrew Tyler, DO            Relevant Problems   Anesthesia (within normal limits)      Cardiovascular   (+) Chronic migraine   (+) Hyperlipidemia   (+) Mixed hyperlipidemia      Endocrine (within normal limits)      GI   (+) Irritable bowel syndrome      /Renal   (+) Nonalcoholic fatty liver disease      Neuro/Psych   (+) Carpal tunnel syndrome on right   (+) Chronic migraine   (+) Depressive disorder   (+) Herniation of cervical intervertebral disc with radiculopathy   (+) Lumbar radiculopathy      Pulmonary   (+) Obstructive sleep apnea syndrome      GI/Hepatic   (+) Nonalcoholic fatty liver disease      Hematology (within normal limits)      Musculoskeletal   (+) Carpal tunnel syndrome on right   (+) Lumbar spondylosis   (+) Spinal stenosis of cervical region      Eyes, Ears, Nose, and Throat (within normal limits)      Infectious Disease (within normal limits)      Other   (+) Arthritis of right acromioclavicular joint       Clinical information reviewed:   Tobacco  Allergies  Meds  Problems  Med Hx  Surg Hx  OB Status    Fam Hx  Soc Hx        NPO Detail:  NPO/Void Status  Date of Last Liquid: 02/19/24  Time of Last Liquid: 2100  Date of Last Solid: 02/19/24  Time of Last Solid: 1800         Physical Exam    Airway  Mallampati: II  TM distance: >3 FB  Neck ROM: full     Cardiovascular - normal exam     Dental - normal exam     Pulmonary - normal exam     Abdominal - normal exam         Anesthesia Plan    History of general anesthesia?: yes  History of complications of general anesthesia?: no    ASA 2     MAC      The patient is not a current smoker.    intravenous induction   Postoperative administration of opioids is intended.  Anesthetic plan and risks discussed with patient.  Use of blood products discussed with patient who.    Plan discussed with CRNA.

## 2024-02-20 NOTE — ANESTHESIA POSTPROCEDURE EVALUATION
Patient: Ronda Dean    Procedure Summary       Date: 02/20/24 Room / Location: [facility] / [facility]    Anesthesia Start: 0940 Anesthesia Stop: 1024    Procedures:       right endoscopic carpal tunnel release right 2nd metacarpal BOSS excision( MAC/LOCAL) RONGEUR BONE WAX (Right: Wrist)      . (Right: Hand) Diagnosis:       Carpal tunnel syndrome on right      (Carpal tunnel syndrome on right [G56.01])    Surgeons: Andrew Tyler DO Responsible Provider: LANDY Childs    Anesthesia Type: MAC ASA Status: 2            Anesthesia Type: MAC    Vitals Value Taken Time   /69 02/20/24 1031   Temp 36.1 °C (97 °F) 02/20/24 1023   Pulse 67 02/20/24 1033   Resp 7 02/20/24 1035   SpO2 100 % 02/20/24 1035   Vitals shown include unvalidated device data.    Anesthesia Post Evaluation    Patient location during evaluation: PACU  Patient participation: complete - patient participated  Level of consciousness: awake and alert  Pain score: 0  Pain management: adequate  Airway patency: patent  Cardiovascular status: acceptable  Respiratory status: acceptable  Hydration status: acceptable  Postoperative Nausea and Vomiting: none    There were no known notable events for this encounter.

## 2024-02-20 NOTE — DISCHARGE INSTRUCTIONS
[facility]  [phone]   Fax: [phone] [address]   [address] [facility] -  [address] [place], [place] [id number]    Upper Extremity - Endoscopic Carpal Tunnel Release and Carpal boss excision    1. Dressing    You have a soft bandage over the operative site.  DO NOT GET DRESSING WET! Once at home, if your dressing feels too tight or mistakenly gets wet, please contact the office. This bandage can be removed in 48 hours and replaced in with Band-Aids as needed. After 48 hours you may wash your hands and shower, but no submerging.    If your sutures are visible (black strings), they will be removed about 10-14 days after surgery.  You should make an appointment to see your physician 10-14 after surgery.    2. Activity     Most people underestimate the length of time required to fully recover from surgery.  It is recommended you take some pain medication the evening following your surgery so when the local anesthetic wears off (in the middle of the night), you are not in severe pain.   With pain medication, start at the lowest dose that controls your pain.       After the first 1-3 days, we encourage you to balance your activity between ambulation, sitting in a chair, and resting in bed with your arm elevated. Let swelling and pain be your guide to activity, you should avoid prolonged (over 20 minutes) standing or sitting. In general, limit your activities to home for the first 1-3 days.    3. Pain    You will be discharged to home with a prescription for oral pain medication. A most important factor in pain control is rest and elevation. Ice may also be applied to the operative site for 30 minute intervals. Please use a waterproof bag for ice or cold packs.  Again, as you feel the numbness resolving and some onset of discomfort, please take pain medication, don't wait till full resolution of block.   Try to use the lowest dose of pain medication  that controls your pain.      The pain medication may cause constipation. Drink plenty of fluids, eat fruits and vegetables. You may use an over the counter laxative or stool softener if necessary. Take pain medication with some food in your stomach, as prescribed by your pharmacist.     4. Elevation     Elevation of the operative extremity is critical. Elevation reduces swelling and minimizes pain. Less swelling is associated with a lower infectious rate, fewer wound complications, less post-operative stiffness, and more rapid recovery of function. To keep the swelling down, your hand must be kept above the level of your heart.

## 2024-02-20 NOTE — OP NOTE
ORTHOPEDIC OPERATIVE NOTE    Name: Ronda Dean  : 1980  Surgeon: Helder Tyler DO  Facility: [facility]  Date of Surgery: 24  ORTHOPEDIC OPERATIVE NOTE    SURGEON:     Helder Tyler DO  PRE OP DIAGNOSIS:  Carpal Tunnel Syndrome right Wrist, right CMC boss  POST OP DIAGNOSIS:  Same  PROCEDURE:   Endoscopic Carpal Tunnel Release right Wrist, right CMC boss excision  ANESTHESIA:  Local with sedation  ASSISTANT:    SA davis  COMPLICATIONS:   None.  CONDITION:    Satisfactory to PACU.  BLOOD LOSS: Minimal    INDICATION FOR PROCEDURE: The patient is a 43 y.o. -year-old female who has failed nonsurgical management for right carpal tunnel syndrome including night splints. They had an extensive workup consisting of signs, symptoms, and clinical history of EMG nerve conduction study consistent with right carpal syndrome.  The patient was seen in the office, fully informed risks and benefits of the procedure, and elected to undergo the procedure.  She also has a painful mass on the dorsum of her hand consistent with a CMC boss and would like that removed which I think is reasonable.    PROCEDURE IN DETAIL: The patient was seen and consented preoperatively with side and site of surgery appropriately marked. The patient was taken back to the operative suite, placed supine on the operative table, and placed on monitor for the duration of case. The patient was administered sedation and monitored throughout the entire surgery by Department of Anesthesia. While sedated, the patient was administered 5 cc of mixed marcaine and lidocaine to the volar aspect of wrist and palm for local anesthesia. The operative upper extremity was then sterilely prepped and draped in sterile orthopedic fashion, elevated with an Esmarch, and tourniquet inflated to 250 mmHg for the duration of case, and time-out was performed confirming site of surgery and surgery to be performed.    A 1-cm transverse incision was made to the  ulnar aspect of the palmaris longus at proximal wrist crease. Skin and underlying tissues were sharply dissected down. Hemostasis maintained with bipolar electrocauterization. Distally based flap of the fascia overlying the median nerve was then released, and under direct visualization, proximal fascia was released with Littler scissors. The elevator and dilator were then placed in the carpal tunnel with appropriate ease of passage and corrugated feel of the undersurface of transcarpal ligament. The endoscope was then placed under direct visualization. The transverse carpal ligament was released in its entirety, confirmed both visually as well as by utilizing endoscope as a probe, which freely passed following release. The nerve was evaluated. No evidence of lesion or adhesion was present.    A 2 cm longitudinal incision was made over the second carpometacarpal joint.  Dissection was taken down through subcutaneous tissue and synovium using Littler scissors.  The bone was exposed of periosteum with a scalpel.  The palpable carpometacarpal boss was then excised with a rongeur.  Images taken before and after confirming adequate excision of the mass.  The wound was irrigated.  Bone wax was placed over the denuded bone.    The wounds irrigated with sterile saline, closed with 4-0 nylon suture. Sterile dressing was then placed of Xeroform and 4x4s affixed with Kerlix and Ace wrap. Tourniquet was deflated, and the patient was taken to PACU in satisfactory condition.    Electronically signed  Helder Tyler DO

## 2024-02-23 ENCOUNTER — TELEPHONE (OUTPATIENT)
Dept: ORTHOPEDIC SURGERY | Facility: CLINIC | Age: 44
End: 2024-02-23
Payer: COMMERCIAL

## 2024-02-23 NOTE — TELEPHONE ENCOUNTER
Patient had surgery on 02/20/2024 -   right endoscopic carpal tunnel release right 2nd metacarpal BOSS excision.  State that from her knuckle to her fingers are numb an wanted to make sure this was normal?   Also state that she is having spasms in her fingers?     Please advise.

## 2024-03-04 ENCOUNTER — OFFICE VISIT (OUTPATIENT)
Dept: ORTHOPEDIC SURGERY | Facility: CLINIC | Age: 44
End: 2024-03-04
Payer: COMMERCIAL

## 2024-03-04 DIAGNOSIS — G56.01 CARPAL TUNNEL SYNDROME ON RIGHT: ICD-10-CM

## 2024-03-04 DIAGNOSIS — M25.731 CARPOMETACARPAL BOSS, RIGHT: ICD-10-CM

## 2024-03-04 PROCEDURE — 99024 POSTOP FOLLOW-UP VISIT: CPT | Performed by: ORTHOPAEDIC SURGERY

## 2024-03-04 PROCEDURE — 1036F TOBACCO NON-USER: CPT | Performed by: ORTHOPAEDIC SURGERY

## 2024-03-04 NOTE — LETTER
March 4, 2024     Patient: Ronda Dean   YOB: 1980   Date of Visit: 3/4/2024       To Whom It May Concern:    It is my medical opinion that Ronda Dean  can return to work on 03/11/2024, can't push, pull or lift anything at all with the right arm for the next month .    If you have any questions or concerns, please don't hesitate to call.         Sincerely,        Andrew Tyler,     CC: No Recipients

## 2024-03-04 NOTE — PROGRESS NOTES
43 y.o. female presents today for for follow up after right endoscopic carpal tunnel release and dorsal CMC boss excision. The patient reports having some swelling and pain in the right arm.  Reports she did have some hives in her arm after surgery that went away with topical hydrocortisone cream.  Also reports some numbness on the top of her hand.. The DOS was 2 weeks ago. Pain is controlled.     Review of Systems    Constitutional: see HPI, no fever, no chills, not feeling tired, no significant weight gain or weight loss.   Eyes: No vision changes  ENT: no nosebleeds.   Cardiovascular: no chest pain.   Respiratory: no shortness of breath and no cough.   Gastrointestinal: no abdominal pain, no nausea, no vomiting and no diarrhea.   Musculoskeletal: per HPI  Neurological: no headache, no gait disturbances  Psychiatric: no depression and no sleep disturbances.   Endocrine: no muscle weakness and no muscle cramps.   Hematologic/Lymphatic: no swollen glands and no tendency for easy bruising or excessive swelling.     Patient's past medical history, past surgical history, allergies, and medications have been reviewed unless otherwise noted in the chart.     Post-Op Exam  Inspection:  no evidence of infection, no erythema, Palpation:  compartments are soft, Range of Motion:  not tested, Stability:  not tested, Strength:  not tested, Skin:  incision site clean, dry and intact, healing without complication, Vascular:  capillary refill <2 seconds distally, Sensation:  intact to light touch distally, volarly, some decreased sensation dorsally around her index finger and radial hand but intact    Constitutional   General appearance: Alert and in no acute distress. Well developed, well nourished.    Eyes   External Eye, Conjunctiva and lids: Normal external exam - pupils were equal in size, round, reactive to light (PERRL) with normal accommodation and extraocular movements intact (EOMI).   Ears, Nose, Mouth, and Throat    Hearing: Normal.   Neck   Neck: No neck mass was observed. Supple.   Pulmonary   Respiratory effort: No respiratory distress.   Cardiovascular   Examination of extremities: No peripheral edema.   Psychiatric   Judgment and insight: Intact.   Orientation to person, place, and time: Alert and oriented x 3.       Mood and affect: Normal.      2 weeks status post endoscopic carpal tunnel release and dorsal boss excision on the right  Based on the history, physical exam and imaging studies above, the patient's presentation is consistent with the above diagnosis.  I had a long discussion with the patient regarding their presentation and the treatment options.    We again discussed her treatment options going forward along with their associated risks and benefits. After thorough discussion, the patient has elected to proceed with postoperative care. All questions were answered to the patients satisfaction who seems satisfied with the plan.  They will call the office with any questions/concerns.     Sutures removed  Steris  Vitamin E cream prn to scar tissue  Activities as tolerated  OT- E/T  FU 6 weeks - no XR

## 2024-03-11 ENCOUNTER — EVALUATION (OUTPATIENT)
Dept: OCCUPATIONAL THERAPY | Facility: HOSPITAL | Age: 44
End: 2024-03-11
Payer: COMMERCIAL

## 2024-03-11 DIAGNOSIS — M25.631 WRIST STIFFNESS, RIGHT: ICD-10-CM

## 2024-03-11 DIAGNOSIS — M25.641 FINGER STIFFNESS, RIGHT: ICD-10-CM

## 2024-03-11 DIAGNOSIS — M25.731 CARPOMETACARPAL BOSS, RIGHT: Primary | ICD-10-CM

## 2024-03-11 DIAGNOSIS — M79.641 HAND PAIN, RIGHT: ICD-10-CM

## 2024-03-11 DIAGNOSIS — G56.01 CARPAL TUNNEL SYNDROME ON RIGHT: ICD-10-CM

## 2024-03-11 PROCEDURE — 97166 OT EVAL MOD COMPLEX 45 MIN: CPT | Mod: GO

## 2024-03-11 PROCEDURE — 97110 THERAPEUTIC EXERCISES: CPT | Mod: GO

## 2024-03-11 PROCEDURE — 97530 THERAPEUTIC ACTIVITIES: CPT | Mod: GO

## 2024-03-11 ASSESSMENT — ENCOUNTER SYMPTOMS
LOSS OF SENSATION IN FEET: 0
OCCASIONAL FEELINGS OF UNSTEADINESS: 0
DEPRESSION: 0

## 2024-03-11 ASSESSMENT — PAIN SCALES - GENERAL: PAINLEVEL_OUTOF10: 8

## 2024-03-11 ASSESSMENT — PAIN - FUNCTIONAL ASSESSMENT: PAIN_FUNCTIONAL_ASSESSMENT: 0-10

## 2024-03-11 NOTE — PROGRESS NOTES
"Occupational Therapy    Occupational Therapy Evaluation  Visit: 1  Name: Ronda Dean  MRN: [id number]  : 1980  Date: 24                        DOS: 24    Therapeutic Procedure Codes:          Assessment:     Patient is a 42 yo female who presents to this facility with performance deficits in ROM, strength and function secondary to significant swelling, stiffness and pain. Pt was instructed to use heat to help with comfort and flexibility prior to exercise and ice as needed to help manage pain and swelling after exercise or when swelling or soreness persist. Pt to use heat or ice max 20 min at any one time. Pt presents with very limiting pain with any and all movements. Pt notes swelling, numbness and pain at and around the MPs especially the index and middle fingers. Pts ROM is significantly compromised in the entire hand with gentle motion significantly increasing her pain. Pt was minimally able to  a yellow foam block and move it 12\" and release x 2. Encouraged PROM if less painful then AROM. Suggested a soft wrist support with stays to offer some support and compression for her R wrist. Provided a compressive sleeve to help manage swelling and offer some support in attempts to also decrease pain. Pt instructed to cut sleeve in half to  reduce compression intensity if needed. Pt to DC if not tolerated well. Pt to continue to elevate and work to move fingers to also help with swelling. Discussed retrograde STM to help with swelling, pt at this time would not tolerate the tactile component.  Pt reports that she has been released to return to work in 2 weeks. The pt is concerned she will not be able to manage her job. Pt advised that OT will assist in helping the pt work to max her ability to resume work duties. Pt at this time is demonstrating significant apprehension regarding efforts to move her wrist fingers and thumb.  Plan:  Occupational therapy intervention plan to include " education/instruction, electrical stimulation, hot pack, ultrasound, manual therapy, neuromuscular re-education, orthotic fitting/training, self-care/home management, therapeutic exercises, therapeutic activities, and home program.   Continue 2x/wk x 4 weeks, progress as tolerated.    Subjective   Current Problem:  1. Carpometacarpal boss, right  Referral to Occupational Therapy    Follow Up In Occupational Therapy      2. Carpal tunnel syndrome on right  Referral to Occupational Therapy    Follow Up In Occupational Therapy      3. Finger stiffness, right        4. Wrist stiffness, right        5. Hand pain, right          General:    Pt has a lengthy hx of progressive numbness and tingling progressing to pain. Had a cyst develop. Came and went. Drained but continued to be an issue. Surgery to remove with the CT release.  R Dom/ R surg  Pain worst- 8/10, lowest- 0/10 when not moving.   Elevate, ice, Ibuprofen.  Pt works [employer]- [employer]. 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 Pt currently not using her hand functionally, adapts by using elbow as she can,  doing most of the chores.  Pt getting help with all IADLs especially two handed tasks, Needs about 30% help from her  to complete ADLs.        Goal for OT: Get back to normal routine         Precautions:   Tolerance- do not push pain.  Precautions Comment: DOS: 02/20/24    I have reviewed the patients medical history form.   Pain Assessment:  Pain Assessment  Pain Assessment: 0-10  Pain Score: 8  Pain Type: Acute pain  Pain Location: Hand  Pain Orientation: Right  Pain Descriptors: Radiating, Burning, Sharp, Shooting    Objective   Wrist Measurements:  WFL unless documented below   Flexion  Extension Radial Dev Ulnar Dev Supination Pronation   Right 20 25 15 20 75 85   Left           Digit Measurements:  WFL unless documented below   MCP PIP DIP DPC   Thumb   NA     Index -40 -5     Long -30 -25     Ring -25 -25     Small -15 -30                      Prior Function Per Pt/Caregiver Report:     Pt reporting IND with ADL and IADL tasks.    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    ADL:  See above        Outcome Measures:     Quickdash Scores: 95%    OP EDUCATION:  Pt encouraged to work to move fingers and wrist with in her tolerance but to work to move often. Pt advised elevation and movement best for reducing swelling. Pt instructed to avoid pushing pain.    Therapy  Exercises: Reps:   Moist heat with gentle passive stretch Ext   PROM All fingers and wrist   AROM Fingers poorly tolerated, gentle wrist           Activities:    Foam Block Grasp with thumb and little finger, move x2               Modalities:                    Manual:                    Functional review:  Wrist ROM, finger ROM ext   Completed on: 03/11/24          Goals:  Active       OT Goals       OT Goal 1       Start:  03/11/24    Expected End:  06/03/24       LTG - Patient will indicate/ demonstrate the ability to resume all preinjury ADLs and IADLs without significant limits secondary to decreased ROM, decreased strength and/or pain as indicated by Quickdash score of less than 20%.           OT Goal 2       Start:  03/11/24    Expected End:  06/03/24       Develop and issue HEP to help maximize ROM, strength and tolerance to help maximize return to all pre-onset activities.             OT Goal 3       Start:  03/11/24    Expected End:  06/03/24       Pain to be less than or equal to 2/10 with greater than or equal to 45 minutes activity.           OT Goal 4       Start:  03/11/24    Expected End:  06/03/24       Patient will demonstrate a progressive increase in ROM as appropriate with R wrist  to be within 5-10 degrees of L Wrist to help patient resume normal ADL and IADL function.           OT Goal 5       Start:  03/11/24    Expected End:  06/03/24       Patient will demonstrate a progressive  increase in ROM as appropriate with R fingers to be within 5-10 degrees of L fingers to help patient resume normal ADL and IADL function.

## 2024-03-14 ENCOUNTER — TREATMENT (OUTPATIENT)
Dept: OCCUPATIONAL THERAPY | Facility: HOSPITAL | Age: 44
End: 2024-03-14
Payer: COMMERCIAL

## 2024-03-14 DIAGNOSIS — M25.731 CARPOMETACARPAL BOSS, RIGHT: ICD-10-CM

## 2024-03-14 DIAGNOSIS — G56.01 CARPAL TUNNEL SYNDROME ON RIGHT: ICD-10-CM

## 2024-03-14 PROCEDURE — 97110 THERAPEUTIC EXERCISES: CPT | Mod: GO,CO

## 2024-03-14 ASSESSMENT — PAIN - FUNCTIONAL ASSESSMENT: PAIN_FUNCTIONAL_ASSESSMENT: 0-10

## 2024-03-14 ASSESSMENT — PAIN SCALES - GENERAL: PAINLEVEL_OUTOF10: 6

## 2024-03-14 NOTE — PROGRESS NOTES
Occupational Therapy    OT Treatment    Patient Name: Ronda Dean  MRN: [id number]  Today's Date: 3/14/2024  Visit 2  Time Calculation  Start Time: 0930  Stop Time: 1030  Time Calculation (min): 60 min             Therapeutic Codes:  OT Therapeutic Procedures Time Entry  Therapeutic Exercise Time Entry: 60  Current Problem:  1. Carpal tunnel syndrome on right  Follow Up In Occupational Therapy      2. Carpometacarpal boss, right  Follow Up In Occupational Therapy        Assessment:  Pt shown increase ROM as tx progressed and shown decrease in swelling as tx progressed. Pt reports she is seeing gains and progress since starting therapy this week. Pt reports she is schedule to return to work on 3/25/24 and expressed concern she will nor be able to do her job and use her dominant hand which therapist agrees at this time. Pt received instructions and HEP to perform HEP 3-4 times daily with 5- 10 reps at a time.    Plan:  Pt to continue with ROM exercises to progress ADL and IADL performances.     Subjective   Pt states she has noticed some increase in ROM and decrease in swelling with edema sleeve and light ROM. Pt also reports a small decrease in pain as well.       Pain:  Pain Assessment  Pain Assessment: 0-10  Pain Score: 6  Pain Type: Acute pain  Pain Location: Hand  Pain Orientation: Right    Objective    Exercises: Reps:   Moist heat with gentle passive stretch Ext   PROM All fingers and wrist   AROM Fingers poorly tolerated, gentle wrist    Opposition pinch 1x10 reps       Activities:    Foam Block Grasp with thumb and little finger, move x2        Edema sleeve fitting        Modalities:                    Manual:                    Functional review:     Completed on: 03/11/24            OP EDUCATION:  Education  Individual(s) Educated: Patient  Education Comment:  (Pt educated and performed finger ROM exercises and received copys and instrcutions to perform daily as HEP.)    Goals:  Active       OT Goals        OT Goal 1       Start:  03/11/24    Expected End:  06/03/24       LTG - Patient will indicate/ demonstrate the ability to resume all preinjury ADLs and IADLs without significant limits secondary to decreased ROM, decreased strength and/or pain as indicated by Quickdash score of less than 20%.           OT Goal 2       Start:  03/11/24    Expected End:  06/03/24       Develop and issue HEP to help maximize ROM, strength and tolerance to help maximize return to all pre-onset activities.             OT Goal 3       Start:  03/11/24    Expected End:  06/03/24       Pain to be less than or equal to 2/10 with greater than or equal to 45 minutes activity.           OT Goal 4       Start:  03/11/24    Expected End:  06/03/24       Patient will demonstrate a progressive increase in ROM as appropriate with R wrist  to be within 5-10 degrees of L Wrist to help patient resume normal ADL and IADL function.           OT Goal 5       Start:  03/11/24    Expected End:  06/03/24       Patient will demonstrate a progressive increase in ROM as appropriate with R fingers to be within 5-10 degrees of L fingers to help patient resume normal ADL and IADL function.

## 2024-03-18 ENCOUNTER — TREATMENT (OUTPATIENT)
Dept: OCCUPATIONAL THERAPY | Facility: HOSPITAL | Age: 44
End: 2024-03-18
Payer: COMMERCIAL

## 2024-03-18 DIAGNOSIS — M25.731 CARPOMETACARPAL BOSS, RIGHT: ICD-10-CM

## 2024-03-18 DIAGNOSIS — G56.01 CARPAL TUNNEL SYNDROME ON RIGHT: ICD-10-CM

## 2024-03-18 PROCEDURE — 97110 THERAPEUTIC EXERCISES: CPT | Mod: GO,CO

## 2024-03-18 ASSESSMENT — PAIN - FUNCTIONAL ASSESSMENT: PAIN_FUNCTIONAL_ASSESSMENT: 0-10

## 2024-03-18 ASSESSMENT — PAIN SCALES - GENERAL: PAINLEVEL_OUTOF10: 7

## 2024-03-18 ASSESSMENT — PAIN DESCRIPTION - DESCRIPTORS: DESCRIPTORS: RADIATING;BURNING

## 2024-03-18 NOTE — PROGRESS NOTES
Occupational Therapy    OT Treatment    Patient Name: Ronda Dean  MRN: [id number]  Today's Date: 3/18/2024  Visit 3  Time Calculation  Start Time: 1015  Stop Time: 1115  Time Calculation (min): 60 min             Therapeutic Codes:  OT Therapeutic Procedures Time Entry  Therapeutic Exercise Time Entry: 60  Current Problem:  1. Carpal tunnel syndrome on right  Follow Up In Occupational Therapy      2. Carpometacarpal boss, right  Follow Up In Occupational Therapy        Assessment:  Pt reports seeing progress today with ROM at fingers an wrist with less pain today at a 5 of 10 from a 7 of 10. Pt received light ROM wrist exercises for HEP to be performed at home today.     Plan:  Pt to continue with finger and wrist ROM to progress ADL and IADL performances.     Subjective   Pt reports pain is a 7 of 10 at the start of tx session. Pt reports she was able to  a shirt one time over the weekend and did exercises 2-3 times a day like she was instructed. Pt also reports using some heat and Ice to help with swelling which is getting better as well.     Pain:  Pain Assessment  Pain Assessment: 0-10  Pain Score: 7  Pain Type: Acute pain  Pain Location: Hand  Pain Orientation: Right  Pain Descriptors: Radiating, Burning    Objective    Exercises: Reps:   Moist heat with gentle passive stretch Ext   PROM All fingers and wrist   AROM Fingers poorly tolerated, gentle wrist    Opposition pinch 1x10 reps    Wrist flexion and ext with gravity assist 1x10 reps   Activities:    Foam Block Grasp with thumb and little finger, move x2        Card flipping activity with opposition pinch with IF and MF with thumb 1/2 of deck.       Modalities:                    Manual:                    Functional review:     Completed on: 03/11/24            OP EDUCATION:  Education  Education Comment:  (Pt educated and performed finger ROM and light wrist ROM exercises and received copys and instrcutions to perform daily as  HEP.)    Goals:  Active       OT Goals       OT Goal 1       Start:  03/11/24    Expected End:  06/03/24       LTG - Patient will indicate/ demonstrate the ability to resume all preinjury ADLs and IADLs without significant limits secondary to decreased ROM, decreased strength and/or pain as indicated by Quickdash score of less than 20%.           OT Goal 2       Start:  03/11/24    Expected End:  06/03/24       Develop and issue HEP to help maximize ROM, strength and tolerance to help maximize return to all pre-onset activities.             OT Goal 3       Start:  03/11/24    Expected End:  06/03/24       Pain to be less than or equal to 2/10 with greater than or equal to 45 minutes activity.           OT Goal 4       Start:  03/11/24    Expected End:  06/03/24       Patient will demonstrate a progressive increase in ROM as appropriate with R wrist  to be within 5-10 degrees of L Wrist to help patient resume normal ADL and IADL function.           OT Goal 5       Start:  03/11/24    Expected End:  06/03/24       Patient will demonstrate a progressive increase in ROM as appropriate with R fingers to be within 5-10 degrees of L fingers to help patient resume normal ADL and IADL function.

## 2024-03-21 ENCOUNTER — TREATMENT (OUTPATIENT)
Dept: OCCUPATIONAL THERAPY | Facility: HOSPITAL | Age: 44
End: 2024-03-21
Payer: COMMERCIAL

## 2024-03-21 DIAGNOSIS — M25.731 CARPOMETACARPAL BOSS, RIGHT: ICD-10-CM

## 2024-03-21 DIAGNOSIS — M25.641 FINGER STIFFNESS, RIGHT: ICD-10-CM

## 2024-03-21 DIAGNOSIS — M79.641 HAND PAIN, RIGHT: Primary | ICD-10-CM

## 2024-03-21 DIAGNOSIS — G56.01 CARPAL TUNNEL SYNDROME ON RIGHT: ICD-10-CM

## 2024-03-21 DIAGNOSIS — M25.631 WRIST STIFFNESS, RIGHT: ICD-10-CM

## 2024-03-21 PROCEDURE — 97110 THERAPEUTIC EXERCISES: CPT | Mod: GO

## 2024-03-21 ASSESSMENT — PAIN DESCRIPTION - DESCRIPTORS: DESCRIPTORS: ACHING;THROBBING

## 2024-03-21 ASSESSMENT — PAIN - FUNCTIONAL ASSESSMENT: PAIN_FUNCTIONAL_ASSESSMENT: 0-10

## 2024-03-21 ASSESSMENT — PAIN SCALES - GENERAL: PAINLEVEL_OUTOF10: 8

## 2024-03-21 NOTE — PROGRESS NOTES
Occupational Therapy    Occupational Therapy Treatment  Visit: 4    Name: Ronda Dean  MRN: [id number]  : 1980  Date: 24                          Current Problem  1. Carpal tunnel syndrome on right  Follow Up In Occupational Therapy      2. Carpometacarpal boss, right  Follow Up In Occupational Therapy        Therapeutic Procedure Codes: and pain       Assessment:      Pt completed therapeutic exercises including the use of a contrast bath with gentle ROM to help reduce swelling and pain. Treatment provided to address ROM of wrist and digits,. Pt demonstrated improved flexibility and slight decrease in discomfort following the contrast bath, Pt still struggled with motion. Pt completed exercises with some difficulty.      Plan:     Pt to continue POC including contrast bath as appropriate to address ROM and function to increase efficiency with IADLs.     Subjective   General:   Pt reports on Monday she lost her balance while donning her PJ bottoms causing her to start to fall. She caught herself on the counter with her R hand and arm causing a significant increase in pain. Pt reports pain went to a 10/10. Pt noted increased swelling and pain. Pt did take Ibuprofen. Pt is noting a return of tingling when she is sleeping. Pt presents today with increased swelling, stiffness and a reluctance to move her hand secondary to pain.    Pain Assessment:  Pain Assessment  Pain Assessment: 0-10  Pain Score: 8  Pain Type: Acute pain  Pain Location: Hand  Pain Orientation: Right  Pain Descriptors: Aching, Throbbing    Objective     DPC Measurements:  WFL unless documented below   Index Long Ring Small Thumb   Right 4.5 4 3 2.5 6   Left              Therapy:  Exercises: Reps:   Moist heat with gentle passive stretch Ext   PROM All fingers and wrist to tolerance   AROM Fingers poorly tolerated, gentle wrist        Wrist flexion and ext    Edema Management Contrast bath 60 sec each x 5 with gentle wrist motion    Activities:    Foam Block                Modalities:                    Manual:                    Functional review:     Completed on: 03/11/24        OP Education:   Pt encouraged to continue with her heat and ice. Also suggested a contrast bath. Provided instruction for contrast bath.     Goals:  Active       OT Goals       OT Goal 1       Start:  03/11/24    Expected End:  06/03/24       LTG - Patient will indicate/ demonstrate the ability to resume all preinjury ADLs and IADLs without significant limits secondary to decreased ROM, decreased strength and/or pain as indicated by Quickdash score of less than 20%.           OT Goal 2       Start:  03/11/24    Expected End:  06/03/24       Develop and issue HEP to help maximize ROM, strength and tolerance to help maximize return to all pre-onset activities.             OT Goal 3       Start:  03/11/24    Expected End:  06/03/24       Pain to be less than or equal to 2/10 with greater than or equal to 45 minutes activity.           OT Goal 4       Start:  03/11/24    Expected End:  06/03/24       Patient will demonstrate a progressive increase in ROM as appropriate with R wrist  to be within 5-10 degrees of L Wrist to help patient resume normal ADL and IADL function.           OT Goal 5       Start:  03/11/24    Expected End:  06/03/24       Patient will demonstrate a progressive increase in ROM as appropriate with R fingers to be within 5-10 degrees of L fingers to help patient resume normal ADL and IADL function.

## 2024-03-25 ENCOUNTER — APPOINTMENT (OUTPATIENT)
Dept: OCCUPATIONAL THERAPY | Facility: HOSPITAL | Age: 44
End: 2024-03-25
Payer: COMMERCIAL

## 2024-03-28 ENCOUNTER — TREATMENT (OUTPATIENT)
Dept: OCCUPATIONAL THERAPY | Facility: HOSPITAL | Age: 44
End: 2024-03-28
Payer: COMMERCIAL

## 2024-03-28 DIAGNOSIS — M25.631 WRIST STIFFNESS, RIGHT: ICD-10-CM

## 2024-03-28 DIAGNOSIS — M79.641 HAND PAIN, RIGHT: ICD-10-CM

## 2024-03-28 DIAGNOSIS — G56.01 CARPAL TUNNEL SYNDROME ON RIGHT: ICD-10-CM

## 2024-03-28 DIAGNOSIS — M25.641 FINGER STIFFNESS, RIGHT: Primary | ICD-10-CM

## 2024-03-28 DIAGNOSIS — M25.731 CARPOMETACARPAL BOSS, RIGHT: ICD-10-CM

## 2024-03-28 PROCEDURE — 97110 THERAPEUTIC EXERCISES: CPT | Mod: GO

## 2024-03-28 ASSESSMENT — PAIN DESCRIPTION - DESCRIPTORS: DESCRIPTORS: ACHING

## 2024-03-28 ASSESSMENT — PAIN - FUNCTIONAL ASSESSMENT: PAIN_FUNCTIONAL_ASSESSMENT: 0-10

## 2024-03-28 ASSESSMENT — PAIN SCALES - GENERAL: PAINLEVEL_OUTOF10: 5 - MODERATE PAIN

## 2024-03-28 NOTE — PROGRESS NOTES
Occupational Therapy    Occupational Therapy Treatment  Visit: 5    Name: Ronda Dean  MRN: [id number]  : 1980  Date:24  Time Calculation  Start Time: 930  Stop Time: 1015  Time Calculation (min): 45 min     OT Therapeutic Procedures Time Entry  Therapeutic Exercise Time Entry: 45                 Current Problem  1. Finger stiffness, right        2. Carpal tunnel syndrome on right  Follow Up In Occupational Therapy      3. Carpometacarpal boss, right  Follow Up In Occupational Therapy      4. Wrist stiffness, right        5. Hand pain, right            Therapeutic Procedure Codes:  OT Therapeutic Procedures Time Entry  Therapeutic Exercise Time Entry: 45    Assessment:      Pt completed therapeutic exercises to address ROM of wrist and digits. Pt still struggling with discomfort an limited ROM. Pt completed exercises with some difficulty.      Plan:     Pt to continue POC as appropriate to address ROM and tolerance to increase efficiency with IADLs.     Subjective   General:  Pt doing better but still stiff and uncomfortable with end ranges.     Pain Assessment:  Pain Assessment  Pain Assessment: 0-10  Pain Score: 5 - Moderate pain  Pain Type: Acute pain  Pain Location: Hand  Pain Orientation: Right  Pain Descriptors: Aching    Objective       DPC Measurements:  WFL unless documented below: 24   Index Long Ring Small Thumb   Right 4.5 4 3 2.5 6   Left              Therapy:  Exercises: DOS 24 Reps:   Moist heat with gentle passive stretch Ext   PROM All fingers and wrist to tolerance   AROM Fingers poorly tolerated, gentle wrist        Wrist flexion and ext    Edema Management    Activities:    Foam Block                Modalities:                    Manual:                    Functional review:     Completed on: 24        OP Education:   Pt to continue to work on ROM.     Goals:  Active       OT Goals       OT Goal 1       Start:  24    Expected End:  24       LTG -  Patient will indicate/ demonstrate the ability to resume all preinjury ADLs and IADLs without significant limits secondary to decreased ROM, decreased strength and/or pain as indicated by Quickdash score of less than 20%.           OT Goal 2       Start:  03/11/24    Expected End:  06/03/24       Develop and issue HEP to help maximize ROM, strength and tolerance to help maximize return to all pre-onset activities.             OT Goal 3       Start:  03/11/24    Expected End:  06/03/24       Pain to be less than or equal to 2/10 with greater than or equal to 45 minutes activity.           OT Goal 4       Start:  03/11/24    Expected End:  06/03/24       Patient will demonstrate a progressive increase in ROM as appropriate with R wrist  to be within 5-10 degrees of L Wrist to help patient resume normal ADL and IADL function.           OT Goal 5       Start:  03/11/24    Expected End:  06/03/24       Patient will demonstrate a progressive increase in ROM as appropriate with R fingers to be within 5-10 degrees of L fingers to help patient resume normal ADL and IADL function.

## 2024-04-01 ENCOUNTER — TREATMENT (OUTPATIENT)
Dept: OCCUPATIONAL THERAPY | Facility: HOSPITAL | Age: 44
End: 2024-04-01
Payer: COMMERCIAL

## 2024-04-01 DIAGNOSIS — G56.01 CARPAL TUNNEL SYNDROME ON RIGHT: ICD-10-CM

## 2024-04-01 DIAGNOSIS — M25.731 CARPOMETACARPAL BOSS, RIGHT: ICD-10-CM

## 2024-04-01 PROCEDURE — 97110 THERAPEUTIC EXERCISES: CPT | Mod: GO,CO

## 2024-04-01 ASSESSMENT — PAIN DESCRIPTION - DESCRIPTORS: DESCRIPTORS: ACHING

## 2024-04-01 ASSESSMENT — PAIN SCALES - GENERAL: PAINLEVEL_OUTOF10: 5 - MODERATE PAIN

## 2024-04-01 ASSESSMENT — PAIN - FUNCTIONAL ASSESSMENT: PAIN_FUNCTIONAL_ASSESSMENT: 0-10

## 2024-04-01 NOTE — PROGRESS NOTES
Occupational Therapy    OT Treatment    Patient Name: Ronda Dean  MRN: [id number]  Today's Date: 4/1/2024  Visit 6  Time Calculation  Start Time: 1015  Stop Time: 1100  Time Calculation (min): 45 min             Therapeutic Codes:  OT Therapeutic Procedures Time Entry  Therapeutic Exercise Time Entry: 45  Current Problem:  1. Carpal tunnel syndrome on right  Follow Up In Occupational Therapy      2. Carpometacarpal boss, right  Follow Up In Occupational Therapy        Assessment:  Pt reports fatigue and soreness from new wrist stretches and starting light hand strengthening with foam block today and received HEP.    Plan:  Pt to continue with wrist stretches and hand strengthening to progress ADL and IADL performances.     Subjective   Pt reports still numbness in tips of fingers which makes it hard to use. Pt reports she is using her hand more and swelling has decrease and ROM have increased.     Pain:  Pain Assessment  Pain Assessment: 0-10  Pain Score: 5 - Moderate pain  Pain Type: Acute pain  Pain Location: Hand  Pain Orientation: Right  Pain Descriptors: Aching    Objective    Exercises: DOS 2/20/24 Reps:   Moist heat with gentle passive stretch Ext   PROM All fingers and wrist to tolerance   AROM Fingers poorly tolerated, gentle wrist   Light hand strengthening Medium Foam block 1x10 reps and digit ext 5 second holds   A/AAROM Wrist flexion and ext, RD, UD and Supination and Pronation 1x10 reps   Edema Management    Activities:    Foam Block                Modalities:                    Manual:                    Functional review:     Completed on: 03/11/24            OP EDUCATION:  Education  Education Comment:  (Pt educated and performed wrist A/AAROM stretches and light hand strengthening with foam block and added to HEP.)    Goals:  Active       OT Goals       OT Goal 1       Start:  03/11/24    Expected End:  06/03/24       LTG - Patient will indicate/ demonstrate the ability to resume all preinjury  ADLs and IADLs without significant limits secondary to decreased ROM, decreased strength and/or pain as indicated by Quickdash score of less than 20%.           OT Goal 2       Start:  03/11/24    Expected End:  06/03/24       Develop and issue HEP to help maximize ROM, strength and tolerance to help maximize return to all pre-onset activities.             OT Goal 3       Start:  03/11/24    Expected End:  06/03/24       Pain to be less than or equal to 2/10 with greater than or equal to 45 minutes activity.           OT Goal 4       Start:  03/11/24    Expected End:  06/03/24       Patient will demonstrate a progressive increase in ROM as appropriate with R wrist  to be within 5-10 degrees of L Wrist to help patient resume normal ADL and IADL function.           OT Goal 5       Start:  03/11/24    Expected End:  06/03/24       Patient will demonstrate a progressive increase in ROM as appropriate with R fingers to be within 5-10 degrees of L fingers to help patient resume normal ADL and IADL function.

## 2024-04-03 ENCOUNTER — APPOINTMENT (OUTPATIENT)
Dept: PRIMARY CARE | Facility: CLINIC | Age: 44
End: 2024-04-03
Payer: COMMERCIAL

## 2024-04-03 ENCOUNTER — TREATMENT (OUTPATIENT)
Dept: OCCUPATIONAL THERAPY | Facility: HOSPITAL | Age: 44
End: 2024-04-03
Payer: COMMERCIAL

## 2024-04-03 DIAGNOSIS — G56.01 CARPAL TUNNEL SYNDROME ON RIGHT: ICD-10-CM

## 2024-04-03 DIAGNOSIS — M25.731 CARPOMETACARPAL BOSS, RIGHT: ICD-10-CM

## 2024-04-03 PROCEDURE — 97110 THERAPEUTIC EXERCISES: CPT | Mod: GO,CO

## 2024-04-03 PROCEDURE — 97530 THERAPEUTIC ACTIVITIES: CPT | Mod: GO,CO

## 2024-04-03 ASSESSMENT — PAIN - FUNCTIONAL ASSESSMENT: PAIN_FUNCTIONAL_ASSESSMENT: 0-10

## 2024-04-03 ASSESSMENT — PAIN SCALES - GENERAL: PAINLEVEL_OUTOF10: 5 - MODERATE PAIN

## 2024-04-03 NOTE — PROGRESS NOTES
Occupational Therapy    OT Treatment    Patient Name: oRnda Dean  MRN: [id number]  Today's Date: 4/3/2024  Visit 7  Time Calculation  Start Time: 0800  Stop Time: 0900  Time Calculation (min): 60 min             Therapeutic Codes:  OT Therapeutic Procedures Time Entry  Therapeutic Activity Time Entry: 15  Therapeutic Exercise Time Entry: 45  Current Problem:  1. Carpal tunnel syndrome on right  Follow Up In Occupational Therapy      2. Carpometacarpal boss, right  Follow Up In Occupational Therapy        Assessment:  Pt tends to need an extra push at times and needs encouragement to focus on techniques and to push through some pain to achieve ROM. Pt also seems to be guarding hands at times when observed in waiting room and during tx session. Pt was educated on the importance to not guard hand to push through some pain to achieve ROM and improve function to return to work.    Plan:  Pt to continue with AAROM and hand strengthening to progress ADL and IADL performances.     Subjective   Pt report she is upset today about MD office and work situation. Pt also reports she forgot her resistive hand foam block at home today.     Pain:  Pain Assessment  Pain Assessment: 0-10  Pain Score: 5 - Moderate pain  Pain Type: Acute pain  Pain Location: Hand  Pain Orientation: Right    Objective    Exercises: DOS 2/20/24 Reps:   Moist heat with gentle passive stretch Ext   PROM All fingers and wrist to tolerance   AROM Fingers poorly tolerated, gentle wrist   Light hand strengthening    A/AAROM Wrist flexion and ext, RD, UD and Supination and Pronation 1x10 reps   Edema Management    Activities:    Foam Block    WB with chair Wrist with digit ext and MCP flexion 1x5 reps each with 30 second holds           Modalities:                    Manual:                    Functional review:     Completed on: 03/11/24            OP EDUCATION:  Education  Education Comment:  (Pt educated and performed wrist A/AAROM stretches with WB on  chair and light hand strengthening with foam block and added to HEP.)    Goals:  Active       OT Goals       OT Goal 1       Start:  03/11/24    Expected End:  06/03/24       LTG - Patient will indicate/ demonstrate the ability to resume all preinjury ADLs and IADLs without significant limits secondary to decreased ROM, decreased strength and/or pain as indicated by Quickdash score of less than 20%.           OT Goal 2       Start:  03/11/24    Expected End:  06/03/24       Develop and issue HEP to help maximize ROM, strength and tolerance to help maximize return to all pre-onset activities.             OT Goal 3       Start:  03/11/24    Expected End:  06/03/24       Pain to be less than or equal to 2/10 with greater than or equal to 45 minutes activity.           OT Goal 4       Start:  03/11/24    Expected End:  06/03/24       Patient will demonstrate a progressive increase in ROM as appropriate with R wrist  to be within 5-10 degrees of L Wrist to help patient resume normal ADL and IADL function.           OT Goal 5       Start:  03/11/24    Expected End:  06/03/24       Patient will demonstrate a progressive increase in ROM as appropriate with R fingers to be within 5-10 degrees of L fingers to help patient resume normal ADL and IADL function.

## 2024-04-04 ENCOUNTER — APPOINTMENT (OUTPATIENT)
Dept: OCCUPATIONAL THERAPY | Facility: HOSPITAL | Age: 44
End: 2024-04-04
Payer: COMMERCIAL

## 2024-04-08 ENCOUNTER — APPOINTMENT (OUTPATIENT)
Dept: OCCUPATIONAL THERAPY | Facility: HOSPITAL | Age: 44
End: 2024-04-08
Payer: COMMERCIAL

## 2024-04-08 ENCOUNTER — OFFICE VISIT (OUTPATIENT)
Dept: PRIMARY CARE | Facility: CLINIC | Age: 44
End: 2024-04-08
Payer: COMMERCIAL

## 2024-04-08 ENCOUNTER — LAB (OUTPATIENT)
Dept: LAB | Facility: LAB | Age: 44
End: 2024-04-08
Payer: COMMERCIAL

## 2024-04-08 VITALS
HEART RATE: 82 BPM | WEIGHT: 164 LBS | TEMPERATURE: 97.7 F | OXYGEN SATURATION: 98 % | SYSTOLIC BLOOD PRESSURE: 104 MMHG | DIASTOLIC BLOOD PRESSURE: 64 MMHG | BODY MASS INDEX: 26.47 KG/M2

## 2024-04-08 DIAGNOSIS — G47.00 INSOMNIA, UNSPECIFIED TYPE: ICD-10-CM

## 2024-04-08 DIAGNOSIS — R53.83 OTHER FATIGUE: ICD-10-CM

## 2024-04-08 DIAGNOSIS — R53.83 OTHER FATIGUE: Primary | ICD-10-CM

## 2024-04-08 DIAGNOSIS — K76.0 NONALCOHOLIC FATTY LIVER DISEASE: ICD-10-CM

## 2024-04-08 DIAGNOSIS — E78.2 MIXED HYPERLIPIDEMIA: ICD-10-CM

## 2024-04-08 DIAGNOSIS — F41.9 ANXIETY: ICD-10-CM

## 2024-04-08 LAB
ALBUMIN SERPL BCP-MCNC: 4.7 G/DL (ref 3.4–5)
ALP SERPL-CCNC: 76 U/L (ref 33–110)
ALT SERPL W P-5'-P-CCNC: 59 U/L (ref 7–45)
ANION GAP SERPL CALC-SCNC: 13 MMOL/L (ref 10–20)
AST SERPL W P-5'-P-CCNC: 36 U/L (ref 9–39)
BASOPHILS # BLD AUTO: 0.03 X10*3/UL (ref 0–0.1)
BASOPHILS NFR BLD AUTO: 0.5 %
BILIRUB SERPL-MCNC: 1 MG/DL (ref 0–1.2)
BUN SERPL-MCNC: 15 MG/DL (ref 6–23)
CALCIUM SERPL-MCNC: 9.5 MG/DL (ref 8.6–10.3)
CHLORIDE SERPL-SCNC: 105 MMOL/L (ref 98–107)
CO2 SERPL-SCNC: 27 MMOL/L (ref 21–32)
CREAT SERPL-MCNC: 0.79 MG/DL (ref 0.5–1.05)
EGFRCR SERPLBLD CKD-EPI 2021: >90 ML/MIN/1.73M*2
EOSINOPHIL # BLD AUTO: 0.09 X10*3/UL (ref 0–0.7)
EOSINOPHIL NFR BLD AUTO: 1.6 %
ERYTHROCYTE [DISTWIDTH] IN BLOOD BY AUTOMATED COUNT: 12.4 % (ref 11.5–14.5)
GLUCOSE SERPL-MCNC: 82 MG/DL (ref 74–99)
HCT VFR BLD AUTO: 42.3 % (ref 36–46)
HGB BLD-MCNC: 13.9 G/DL (ref 12–16)
IMM GRANULOCYTES # BLD AUTO: 0.02 X10*3/UL (ref 0–0.7)
IMM GRANULOCYTES NFR BLD AUTO: 0.4 % (ref 0–0.9)
LYMPHOCYTES # BLD AUTO: 2.5 X10*3/UL (ref 1.2–4.8)
LYMPHOCYTES NFR BLD AUTO: 45.8 %
MCH RBC QN AUTO: 30 PG (ref 26–34)
MCHC RBC AUTO-ENTMCNC: 32.9 G/DL (ref 32–36)
MCV RBC AUTO: 91 FL (ref 80–100)
MONOCYTES # BLD AUTO: 0.33 X10*3/UL (ref 0.1–1)
MONOCYTES NFR BLD AUTO: 6 %
NEUTROPHILS # BLD AUTO: 2.49 X10*3/UL (ref 1.2–7.7)
NEUTROPHILS NFR BLD AUTO: 45.7 %
NRBC BLD-RTO: 0 /100 WBCS (ref 0–0)
PLATELET # BLD AUTO: 176 X10*3/UL (ref 150–450)
POTASSIUM SERPL-SCNC: 4.3 MMOL/L (ref 3.5–5.3)
PROT SERPL-MCNC: 7.2 G/DL (ref 6.4–8.2)
RBC # BLD AUTO: 4.64 X10*6/UL (ref 4–5.2)
SODIUM SERPL-SCNC: 141 MMOL/L (ref 136–145)
TSH SERPL-ACNC: 1.5 MIU/L (ref 0.44–3.98)
VIT B12 SERPL-MCNC: 301 PG/ML (ref 211–911)
WBC # BLD AUTO: 5.5 X10*3/UL (ref 4.4–11.3)

## 2024-04-08 PROCEDURE — 85025 COMPLETE CBC W/AUTO DIFF WBC: CPT

## 2024-04-08 PROCEDURE — 99214 OFFICE O/P EST MOD 30 MIN: CPT | Performed by: FAMILY MEDICINE

## 2024-04-08 PROCEDURE — 1036F TOBACCO NON-USER: CPT | Performed by: FAMILY MEDICINE

## 2024-04-08 PROCEDURE — 84443 ASSAY THYROID STIM HORMONE: CPT

## 2024-04-08 PROCEDURE — 82607 VITAMIN B-12: CPT

## 2024-04-08 PROCEDURE — 80053 COMPREHEN METABOLIC PANEL: CPT

## 2024-04-08 PROCEDURE — 36415 COLL VENOUS BLD VENIPUNCTURE: CPT

## 2024-04-08 RX ORDER — QUETIAPINE FUMARATE 25 MG/1
25 TABLET, FILM COATED ORAL NIGHTLY
Qty: 30 TABLET | Refills: 0 | Status: SHIPPED | OUTPATIENT
Start: 2024-04-08 | End: 2024-05-08

## 2024-04-08 RX ORDER — ATORVASTATIN CALCIUM 10 MG/1
10 TABLET, FILM COATED ORAL DAILY
Qty: 30 TABLET | Refills: 0 | Status: SHIPPED | OUTPATIENT
Start: 2024-04-08 | End: 2024-05-08

## 2024-04-08 ASSESSMENT — ENCOUNTER SYMPTOMS
DYSPHORIC MOOD: 0
ABDOMINAL PAIN: 0
HEADACHES: 0
BLOOD IN STOOL: 0
NAUSEA: 0
FATIGUE: 1
CONSTIPATION: 0
NERVOUS/ANXIOUS: 1
POLYDIPSIA: 0
PALPITATIONS: 0
VOMITING: 0
DIARRHEA: 0
MYALGIAS: 0
DIFFICULTY URINATING: 0
DYSURIA: 0
POLYPHAGIA: 0
DIZZINESS: 0
SLEEP DISTURBANCE: 1
SHORTNESS OF BREATH: 0

## 2024-04-08 NOTE — PROGRESS NOTES
"Subjective   Patient ID: Ronda Dean is a 44 y.o. female who presents for Fatigue (Discuss fatigue x\"years\"- pt states still not feeling better) and multiple issues.     Fatigue  Associated symptoms include fatigue. Pertinent negatives include no abdominal pain, chest pain, headaches, myalgias, nausea, rash or vomiting.      Fatigue/mood: fatigue recurrent and chronic but worsening since had hand surgery in February.  Not sleeping well.  Hydroxyzine in the past did not help.  Ambien made her sleep walk.  Would like to try alternate medication.  Does report history of anxiety.  Mind does not shut off    Fatty liver: Did see hepatology who recommended cholesterol treatment.  Counseled did mention patient okay for statin.  Patient willing to try      Review of Systems   Constitutional:  Positive for fatigue.   HENT: Negative.     Eyes:  Negative for visual disturbance.   Respiratory:  Negative for shortness of breath.    Cardiovascular:  Negative for chest pain and palpitations.   Gastrointestinal:  Negative for abdominal pain, blood in stool, constipation, diarrhea, nausea and vomiting.   Endocrine: Negative for cold intolerance, heat intolerance, polydipsia, polyphagia and polyuria.   Genitourinary:  Negative for difficulty urinating and dysuria.   Musculoskeletal:  Negative for myalgias.   Skin:  Negative for rash.   Neurological:  Negative for dizziness and headaches.   Psychiatric/Behavioral:  Positive for sleep disturbance. Negative for dysphoric mood. The patient is nervous/anxious.        Objective   /64   Pulse 82   Temp 36.5 °C (97.7 °F)   Wt 74.4 kg (164 lb)   SpO2 98%   BMI 26.47 kg/m²     Physical Exam  Vitals and nursing note reviewed.   Constitutional:       General: She is not in acute distress.     Appearance: Normal appearance. She is not toxic-appearing.   HENT:      Head: Normocephalic.      Nose: Nose normal.      Mouth/Throat:      Pharynx: Oropharynx is clear.   Eyes:      General: " No scleral icterus.     Pupils: Pupils are equal, round, and reactive to light.   Cardiovascular:      Rate and Rhythm: Normal rate and regular rhythm.      Heart sounds: No murmur heard.  Pulmonary:      Effort: Pulmonary effort is normal. No respiratory distress.      Breath sounds: Normal breath sounds.   Abdominal:      General: Bowel sounds are normal.      Palpations: Abdomen is soft.      Tenderness: There is no abdominal tenderness. There is no guarding.   Musculoskeletal:         General: Swelling (rt hand-mild) present. No tenderness.      Right lower leg: No edema.      Left lower leg: No edema.   Skin:     General: Skin is warm.   Neurological:      General: No focal deficit present.      Mental Status: She is alert.      Cranial Nerves: No cranial nerve deficit.   Psychiatric:         Mood and Affect: Mood normal.         Assessment/Plan   Problem List Items Addressed This Visit             ICD-10-CM    Mixed hyperlipidemia E78.2    Relevant Medications    atorvastatin (Lipitor) 10 mg tablet    Nonalcoholic fatty liver disease K76.0    Relevant Orders    Comprehensive Metabolic Panel (Completed)     Other Visit Diagnoses         Codes    Other fatigue    -  Primary R53.83    Relevant Orders    Comprehensive Metabolic Panel (Completed)    CBC and Auto Differential (Completed)    TSH with reflex to Free T4 if abnormal (Completed)    Vitamin B12 (Completed)    Insomnia, unspecified type     G47.00    Relevant Medications    QUEtiapine (SEROquel) 25 mg tablet    Anxiety     F41.9    Relevant Medications    QUEtiapine (SEROquel) 25 mg tablet        Discussed side effects of meds. Recommendations given

## 2024-04-08 NOTE — PATIENT INSTRUCTIONS
Recommend a predominant low fat whole foods plant based diet.  Cut back on meat, dairy, processed carbs, salt and oils. Increase fiber in your diet.  Decrease alcohol as much as possible if you drink. Recommend regular exercise most days of the week(goal up to 150min per week). Also recommend good sleep habits aiming for 7-8 hours per night.     Obtain your blood work    Start seroquel.     If tolerating seroquel, start atorvastatin.take with coq10 100-200mg     Repeat liver test 1-2 weeks after starting atorvastatin    Return in 1 month, sooner if needed

## 2024-04-09 ENCOUNTER — TELEPHONE (OUTPATIENT)
Dept: PRIMARY CARE | Facility: CLINIC | Age: 44
End: 2024-04-09
Payer: COMMERCIAL

## 2024-04-09 NOTE — TELEPHONE ENCOUNTER
----- Message from Abiola Muhammad DO sent at 4/8/2024 10:36 PM EDT -----  Please tell patient her vitamin B 12, thyroid, blood counts were normal.  Her 1 liver test is only slightly high.  This is likely from her fatty liver.  Plan as given per her office visit

## 2024-04-10 ENCOUNTER — TREATMENT (OUTPATIENT)
Dept: OCCUPATIONAL THERAPY | Facility: HOSPITAL | Age: 44
End: 2024-04-10
Payer: COMMERCIAL

## 2024-04-10 DIAGNOSIS — G56.01 CARPAL TUNNEL SYNDROME ON RIGHT: ICD-10-CM

## 2024-04-10 DIAGNOSIS — M25.731 CARPOMETACARPAL BOSS, RIGHT: ICD-10-CM

## 2024-04-10 PROCEDURE — 97110 THERAPEUTIC EXERCISES: CPT | Mod: GO,CO

## 2024-04-10 PROCEDURE — 97530 THERAPEUTIC ACTIVITIES: CPT | Mod: GO,CO

## 2024-04-10 PROCEDURE — 97140 MANUAL THERAPY 1/> REGIONS: CPT | Mod: GO,CO

## 2024-04-10 ASSESSMENT — PAIN - FUNCTIONAL ASSESSMENT: PAIN_FUNCTIONAL_ASSESSMENT: 0-10

## 2024-04-10 ASSESSMENT — PAIN SCALES - GENERAL: PAINLEVEL_OUTOF10: 5 - MODERATE PAIN

## 2024-04-10 NOTE — PROGRESS NOTES
Occupational Therapy    OT Treatment    Patient Name: Ronda Dean  MRN: [id number]  Today's Date: 4/10/2024  Visit 8  Time Calculation  Start Time: 0735  Stop Time: 0850  Time Calculation (min): 75 min             Therapeutic Codes:  OT Therapeutic Procedures Time Entry  Manual Therapy Time Entry: 15  Therapeutic Activity Time Entry: 30  Therapeutic Exercise Time Entry: 30  Current Problem:  1. Carpal tunnel syndrome on right  Follow Up In Occupational Therapy      2. Carpometacarpal boss, right  Follow Up In Occupational Therapy        Assessment:  Pt is very hypersensitive with R hand and at scars with Desensitization and scar management performed today.     Plan:  Pt to continue with Desensitization with scar management and AAROM with strengthening to progress ADL and IADL performances.     Subjective   Pt reports less swelling and more use at home with R hand. Pt reports she is picking up more objects using R hand, but she notices she is favoring R IF and keeping it sticking out at times. Pt reports missing last appointment due to bad migraines.    Pain:  Pain Assessment  Pain Assessment: 0-10  Pain Score: 5 - Moderate pain  Pain Type: Acute pain  Pain Location: Hand  Pain Orientation: Right    Objective    Exercises: DOS 2/20/24 Reps:   Moist heat with gentle passive stretch Ext   PROM All fingers and wrist to tolerance   AROM Fingers poorly tolerated, gentle wrist   Light hand strengthening    A/AAROM Wrist flexion and ext, RD, UD and Supination and Pronation 1x10 reps   Edema Management    Activities:    Foam Block    WB with chair Wrist with digit ext and MCP flexion 1x7 reps each with 30 second holds   Clothes pins 3 finger pinch 1#-4# on and off with R hand only       Modalities:                    Manual:    Desensitization and scar management R hand and fingers with PROM 15 minutes                Functional review:     Completed on: 03/11/24            OP EDUCATION:  Education  Individual(s) Educated:  Patient  Education Provided: Edema control  Home Program:  (Pt educated on proper wear time anc care of edema glove to help reduce edema in R hand to increase ROM.)  Education Comment:  (Pt educated and performed wrist A/AAROM stretches with WB on chair and desensitization with scar management and added to HEP.)    Goals:  Active       OT Goals       OT Goal 1       Start:  03/11/24    Expected End:  06/03/24       LTG - Patient will indicate/ demonstrate the ability to resume all preinjury ADLs and IADLs without significant limits secondary to decreased ROM, decreased strength and/or pain as indicated by Quickdash score of less than 20%.           OT Goal 2       Start:  03/11/24    Expected End:  06/03/24       Develop and issue HEP to help maximize ROM, strength and tolerance to help maximize return to all pre-onset activities.             OT Goal 3       Start:  03/11/24    Expected End:  06/03/24       Pain to be less than or equal to 2/10 with greater than or equal to 45 minutes activity.           OT Goal 4       Start:  03/11/24    Expected End:  06/03/24       Patient will demonstrate a progressive increase in ROM as appropriate with R wrist  to be within 5-10 degrees of L Wrist to help patient resume normal ADL and IADL function.           OT Goal 5       Start:  03/11/24    Expected End:  06/03/24       Patient will demonstrate a progressive increase in ROM as appropriate with R fingers to be within 5-10 degrees of L fingers to help patient resume normal ADL and IADL function.

## 2024-04-11 ENCOUNTER — APPOINTMENT (OUTPATIENT)
Dept: OCCUPATIONAL THERAPY | Facility: HOSPITAL | Age: 44
End: 2024-04-11
Payer: COMMERCIAL

## 2024-04-15 ENCOUNTER — TREATMENT (OUTPATIENT)
Dept: OCCUPATIONAL THERAPY | Facility: HOSPITAL | Age: 44
End: 2024-04-15
Payer: COMMERCIAL

## 2024-04-15 DIAGNOSIS — G56.01 CARPAL TUNNEL SYNDROME ON RIGHT: ICD-10-CM

## 2024-04-15 DIAGNOSIS — M25.731 CARPOMETACARPAL BOSS, RIGHT: ICD-10-CM

## 2024-04-15 PROCEDURE — 97140 MANUAL THERAPY 1/> REGIONS: CPT | Mod: GO,CO

## 2024-04-15 PROCEDURE — 97110 THERAPEUTIC EXERCISES: CPT | Mod: GO,CO

## 2024-04-15 PROCEDURE — 97530 THERAPEUTIC ACTIVITIES: CPT | Mod: GO,CO

## 2024-04-15 ASSESSMENT — PAIN SCALES - GENERAL: PAINLEVEL_OUTOF10: 8

## 2024-04-15 ASSESSMENT — PAIN - FUNCTIONAL ASSESSMENT: PAIN_FUNCTIONAL_ASSESSMENT: 0-10

## 2024-04-15 NOTE — PROGRESS NOTES
Occupational Therapy    OT Treatment    Patient Name: Ronda Dean  MRN: [id number]  Today's Date: 4/15/2024  Visit 9  Time Calculation  Start Time: 1300  Stop Time: 1400  Time Calculation (min): 60 min             Therapeutic Codes:  OT Therapeutic Procedures Time Entry  Manual Therapy Time Entry: 15  Therapeutic Activity Time Entry: 15  Therapeutic Exercise Time Entry: 30  Current Problem:  1. Carpal tunnel syndrome on right  Follow Up In Occupational Therapy      2. Carpometacarpal boss, right  Follow Up In Occupational Therapy        Assessment:  Pt showing some slow signs of gains with exercises and stretches. Pt still needs encouragement to perform stretches and to push through some pain to increase ROM and abilities with R hand and wrist.     Plan:  Pt to continue with ROM stretches, IASTM and WB to progress ADL and IADL performances.     Subjective   Pt reports pain is higher today and she reports she is wearing edema glove for 1-2 hours at a time and for total of 6-8 hours daily. Pt reports she isn't wearing the edema glove much at night yet and not every night.     Pain:  Pain Assessment  Pain Assessment: 0-10  Pain Score: 8  Pain Type: Acute pain  Pain Location: Hand  Pain Orientation: Right    Objective    Exercises: DOS 2/20/24 Reps:   Moist heat with gentle passive stretch Ext   PROM All fingers and wrist to tolerance   AROM Fingers poorly tolerated, gentle wrist   Light hand strengthening    A/AAROM Wrist flexion and ext, RD, UD and Supination and Pronation 1x10 reps   Edema Management    Activities:    Foam Block    WB with chair Wrist with digit ext and MCP flexion 1x7 reps each with 30 second holds   Clothes pins 3 finger pinch 1#-4# on and off with R hand only       Modalities:                    Manual:    Desensitization and scar management R hand and fingers with PROM 15 minutes                Functional review:     Completed on: 03/11/24              OP EDUCATION:  Education  Individual(s)  Educated: Patient  Education Provided: Edema control  Education Comment:  (Pt educated and performed wrist A/AAROM stretches with WB on chair and desensitization with scar management and added to HEP.)    Goals:  Active       OT Goals       OT Goal 1       Start:  03/11/24    Expected End:  06/03/24       LTG - Patient will indicate/ demonstrate the ability to resume all preinjury ADLs and IADLs without significant limits secondary to decreased ROM, decreased strength and/or pain as indicated by Quickdash score of less than 20%.           OT Goal 2       Start:  03/11/24    Expected End:  06/03/24       Develop and issue HEP to help maximize ROM, strength and tolerance to help maximize return to all pre-onset activities.             OT Goal 3       Start:  03/11/24    Expected End:  06/03/24       Pain to be less than or equal to 2/10 with greater than or equal to 45 minutes activity.           OT Goal 4       Start:  03/11/24    Expected End:  06/03/24       Patient will demonstrate a progressive increase in ROM as appropriate with R wrist  to be within 5-10 degrees of L Wrist to help patient resume normal ADL and IADL function.           OT Goal 5       Start:  03/11/24    Expected End:  06/03/24       Patient will demonstrate a progressive increase in ROM as appropriate with R fingers to be within 5-10 degrees of L fingers to help patient resume normal ADL and IADL function.

## 2024-04-17 ENCOUNTER — OFFICE VISIT (OUTPATIENT)
Dept: ORTHOPEDIC SURGERY | Facility: CLINIC | Age: 44
End: 2024-04-17
Payer: COMMERCIAL

## 2024-04-17 ENCOUNTER — TREATMENT (OUTPATIENT)
Dept: OCCUPATIONAL THERAPY | Facility: HOSPITAL | Age: 44
End: 2024-04-17
Payer: COMMERCIAL

## 2024-04-17 VITALS — HEIGHT: 66 IN | WEIGHT: 160 LBS | BODY MASS INDEX: 25.71 KG/M2

## 2024-04-17 DIAGNOSIS — M25.731 CARPOMETACARPAL BOSS, RIGHT: ICD-10-CM

## 2024-04-17 DIAGNOSIS — G56.01 CARPAL TUNNEL SYNDROME ON RIGHT: Primary | ICD-10-CM

## 2024-04-17 DIAGNOSIS — G56.01 CARPAL TUNNEL SYNDROME ON RIGHT: ICD-10-CM

## 2024-04-17 PROCEDURE — 99024 POSTOP FOLLOW-UP VISIT: CPT | Performed by: ORTHOPAEDIC SURGERY

## 2024-04-17 PROCEDURE — 97110 THERAPEUTIC EXERCISES: CPT | Mod: GO,CO

## 2024-04-17 PROCEDURE — 97530 THERAPEUTIC ACTIVITIES: CPT | Mod: GO,CO

## 2024-04-17 PROCEDURE — 97140 MANUAL THERAPY 1/> REGIONS: CPT | Mod: GO,CO

## 2024-04-17 RX ORDER — METHYLPREDNISOLONE 4 MG/1
4 TABLET ORAL ONCE
Qty: 1 TABLET | Refills: 0 | Status: SHIPPED | OUTPATIENT
Start: 2024-04-17 | End: 2024-04-17

## 2024-04-17 RX ORDER — MELOXICAM 15 MG/1
15 TABLET ORAL DAILY
Qty: 30 TABLET | Refills: 11 | Status: SHIPPED | OUTPATIENT
Start: 2024-04-17 | End: 2025-04-17

## 2024-04-17 ASSESSMENT — PAIN SCALES - GENERAL
PAINLEVEL_OUTOF10: 8
PAINLEVEL_OUTOF10: 7

## 2024-04-17 ASSESSMENT — PAIN - FUNCTIONAL ASSESSMENT: PAIN_FUNCTIONAL_ASSESSMENT: 0-10

## 2024-04-17 NOTE — PROGRESS NOTES
44 y.o. female presents today for for follow up after right endoscopic carpal tunnel release and dorsal CMC boss excision. The patient reports having some swelling and pain in the right hand.  Reports she did have some hives in her arm after surgery that went away with topical hydrocortisone cream.  Also reports some numbness on the top of her hand.. The DOS was 8 weeks ago. Pain is controlled.  Has been going to occupational therapy without much improvement in her symptoms.    Review of Systems    Constitutional: see HPI, no fever, no chills, not feeling tired, no significant weight gain or weight loss.   Eyes: No vision changes  ENT: no nosebleeds.   Cardiovascular: no chest pain.   Respiratory: no shortness of breath and no cough.   Gastrointestinal: no abdominal pain, no nausea, no vomiting and no diarrhea.   Musculoskeletal: per HPI  Neurological: no headache, no gait disturbances  Psychiatric: no depression and no sleep disturbances.   Endocrine: no muscle weakness and no muscle cramps.   Hematologic/Lymphatic: no swollen glands and no tendency for easy bruising or excessive swelling.     Patient's past medical history, past surgical history, allergies, and medications have been reviewed unless otherwise noted in the chart.     Post-Op Exam  Inspection:  no evidence of infection, no erythema, moderate edema palpation:  compartments are soft, Range of Motion: 4040 flexion extension, full supination pronation, fingers 10 degrees from full extension, and about 1 cm from full flexion stability: Stable strength:  not tested, Skin:  incision site clean, dry and intact, healing without complication, Vascular:  capillary refill <2 seconds distally, Sensation:  intact to light touch distally, volarly, absent sensation dorsally over her index finger, intact over her thumb    Constitutional   General appearance: Alert and in no acute distress. Well developed, well nourished.    Eyes   External Eye, Conjunctiva and lids:  Normal external exam - pupils were equal in size, round, reactive to light (PERRL) with normal accommodation and extraocular movements intact (EOMI).   Ears, Nose, Mouth, and Throat   Hearing: Normal.   Neck   Neck: No neck mass was observed. Supple.   Pulmonary   Respiratory effort: No respiratory distress.   Cardiovascular   Examination of extremities: No peripheral edema.   Psychiatric   Judgment and insight: Intact.   Orientation to person, place, and time: Alert and oriented x 3.       Mood and affect: Normal.      6 weeks status post endoscopic carpal tunnel release and dorsal boss excision on the right with possible complex regional pain syndrome  Based on the history, physical exam and imaging studies above, the patient's presentation is consistent with the above diagnosis.  I had a long discussion with the patient regarding their presentation and the treatment options.    We again discussed her treatment options going forward along with their associated risks and benefits. After thorough discussion, the patient has elected to proceed with postoperative care. All questions were answered to the patients satisfaction who seems satisfied with the plan.  They will call the office with any questions/concerns.     Vitamin E cream prn to scar tissue  Activities as tolerated  OT- E/T  Steroid taper  Mobic 15 mg once daily  Her hand appears to be presenting similar to that of complex regional pain syndrome therefore we will do a steroid taper and started on an anti-inflammatory while continuing therapy and see her back in a month to evaluate her symptoms  FU 4 weeks - no XR

## 2024-04-17 NOTE — PROGRESS NOTES
Occupational Therapy    OT Treatment    Patient Name: Ronda Dean  MRN: [id number]  Today's Date: 4/17/2024  Visit 10  Time Calculation  Start Time: 0730  Stop Time: 0845  Time Calculation (min): 75 min             Therapeutic Codes:  OT Therapeutic Procedures Time Entry  Manual Therapy Time Entry: 15  Therapeutic Activity Time Entry: 30  Therapeutic Exercise Time Entry: 30  Current Problem:  1. Carpal tunnel syndrome on right  Follow Up In Occupational Therapy      2. Carpometacarpal boss, right  Follow Up In Occupational Therapy        Assessment:  Pt did much better today, with increase effort and ability to tolerate increase resistance and workload. Pt reports pain with touch and use, but pt was encourage to push through pain for exercises, scar management and daily use at home with normal ADL.    Plan:  Pt to continue with STM, therapy exercises and activities to progress ADL and IADL performances.     Subjective   Pt reports she still is in a lot of pain and she made an appointment to see MD [facility] today before she returns to work. Pt reports she tried to hang up her clothes in her closet the other day and she couldn't due it to the pain and sensitivity with R hand.     Pain:  Pain Assessment  Pain Assessment: 0-10  Pain Score: 8  Pain Type: Acute pain  Pain Location: Hand  Pain Orientation: Right    Objective    Exercises: DOS 2/20/24 Reps:   Moist heat with gentle passive stretch Ext   PROM All fingers and wrist to tolerance   AROM Fingers poorly tolerated, gentle wrist   Light hand strengthening    A/AAROM Wrist flexion and ext, RD, UD and Supination and Pronation 1x10 reps   Edema Management    Activities:    Foam Block Velcro checks on and off with thumb opposition pinch IF, MF, RF    WB with chair Wrist with digit ext and MCP flexion 1x7 reps each with 30 second holds   Clothes pins 3 finger pinch 1#-8# on and off with R hand only       Modalities:                    Manual:    Desensitization and scar  management R hand and fingers with PROM 15 minutes                Functional review:     Completed on: 04/17/24      DPC Measurements:  WFL unless documented below PROM   Index Long Ring Small Thumb   Right 0 0 0 0 0   Left                 OP EDUCATION:  Education  Individual(s) Educated: Patient  Education Provided: Edema control  Education Comment:  (Pt educated and performed wrist A/AAROM stretches with WB on chair and desensitization with scar management and oppostion pinch added to HEP.)    Goals:  Active       OT Goals       OT Goal 1       Start:  03/11/24    Expected End:  06/03/24       LTG - Patient will indicate/ demonstrate the ability to resume all preinjury ADLs and IADLs without significant limits secondary to decreased ROM, decreased strength and/or pain as indicated by Quickdash score of less than 20%.           OT Goal 2       Start:  03/11/24    Expected End:  06/03/24       Develop and issue HEP to help maximize ROM, strength and tolerance to help maximize return to all pre-onset activities.             OT Goal 3       Start:  03/11/24    Expected End:  06/03/24       Pain to be less than or equal to 2/10 with greater than or equal to 45 minutes activity.           OT Goal 4       Start:  03/11/24    Expected End:  06/03/24       Patient will demonstrate a progressive increase in ROM as appropriate with R wrist  to be within 5-10 degrees of L Wrist to help patient resume normal ADL and IADL function.           OT Goal 5       Start:  03/11/24    Expected End:  06/03/24       Patient will demonstrate a progressive increase in ROM as appropriate with R fingers to be within 5-10 degrees of L fingers to help patient resume normal ADL and IADL function.

## 2024-04-17 NOTE — PROGRESS NOTES
8 week post op right ECTR and 2nd boss excision 2/20/2024- She is still very swollen, numb and painfull.  She is doing OT twice a week with no change.  She is wearing a compression glove.  She states everything she touches feels like sandpaper.

## 2024-04-22 ENCOUNTER — TREATMENT (OUTPATIENT)
Dept: OCCUPATIONAL THERAPY | Facility: HOSPITAL | Age: 44
End: 2024-04-22
Payer: COMMERCIAL

## 2024-04-22 DIAGNOSIS — M25.731 CARPOMETACARPAL BOSS, RIGHT: ICD-10-CM

## 2024-04-22 DIAGNOSIS — G56.01 CARPAL TUNNEL SYNDROME ON RIGHT: ICD-10-CM

## 2024-04-22 PROCEDURE — 97110 THERAPEUTIC EXERCISES: CPT | Mod: GO,CO

## 2024-04-22 PROCEDURE — 97140 MANUAL THERAPY 1/> REGIONS: CPT | Mod: GO,CO

## 2024-04-22 ASSESSMENT — PAIN SCALES - GENERAL: PAINLEVEL_OUTOF10: 7

## 2024-04-22 ASSESSMENT — PAIN - FUNCTIONAL ASSESSMENT: PAIN_FUNCTIONAL_ASSESSMENT: 0-10

## 2024-04-22 NOTE — PROGRESS NOTES
Occupational Therapy    OT Treatment    Patient Name: Ronda Dean  MRN: [id number]  Today's Date: 4/22/2024  Visit 11  Time Calculation  Start Time: 1300  Stop Time: 1400  Time Calculation (min): 60 min             Therapeutic Codes:  OT Therapeutic Procedures Time Entry  Manual Therapy Time Entry: 15  Therapeutic Exercise Time Entry: 45  Current Problem:  1. Carpal tunnel syndrome on right  Follow Up In Occupational Therapy      2. Carpometacarpal boss, right  Follow Up In Occupational Therapy        Assessment:  Pt did well with tx session today and able to tolerate STM better today.  Plan:  Pt to continue with STM and functional strength and ROM to progress ADL and IADL performances.     Subjective Pt reports she seen MD Tyler and she was diagnosed with CRPS. Pt reports being off of work until 5/13/24 for when she she's MD next.     Pain:  Pain Assessment  Pain Assessment: 0-10  Pain Score: 7  Pain Type: Acute pain  Pain Location: Hand  Pain Orientation: Right    Objective    Exercises: DOS 2/20/24 Reps:   Moist heat with gentle passive stretch Ext   PROM All fingers and wrist to tolerance   AROM Fingers poorly tolerated, gentle wrist   Light hand strengthening    A/AAROM Wrist flexion and ext, RD, UD and Supination and Pronation 1x10 reps   Edema Management    Activities:    Foam Block Velcro checks on and off with thumb opposition pinch IF, MF, RF    WB with chair Wrist with digit ext and MCP flexion 1x7 reps each with 30 second holds   Clothes pins 3 finger pinch 1#-8# on and off with R hand only       Modalities:                    Manual:    Desensitization and scar management R hand and fingers with PROM 15 minutes                Functional review:     Completed on: 04/17/24          OP EDUCATION:  Education  Individual(s) Educated: Patient  Education Provided: Edema control  Education Comment:  (Pt educated and performed wrist A/AAROM stretches with WB on chair and desensitization with scar management  and oppostion pinch added to HEP.)    Goals:  Active       OT Goals       OT Goal 1       Start:  03/11/24    Expected End:  06/03/24       LTG - Patient will indicate/ demonstrate the ability to resume all preinjury ADLs and IADLs without significant limits secondary to decreased ROM, decreased strength and/or pain as indicated by Quickdash score of less than 20%.           OT Goal 2       Start:  03/11/24    Expected End:  06/03/24       Develop and issue HEP to help maximize ROM, strength and tolerance to help maximize return to all pre-onset activities.             OT Goal 3       Start:  03/11/24    Expected End:  06/03/24       Pain to be less than or equal to 2/10 with greater than or equal to 45 minutes activity.           OT Goal 4       Start:  03/11/24    Expected End:  06/03/24       Patient will demonstrate a progressive increase in ROM as appropriate with R wrist  to be within 5-10 degrees of L Wrist to help patient resume normal ADL and IADL function.           OT Goal 5       Start:  03/11/24    Expected End:  06/03/24       Patient will demonstrate a progressive increase in ROM as appropriate with R fingers to be within 5-10 degrees of L fingers to help patient resume normal ADL and IADL function.

## 2024-04-24 ENCOUNTER — APPOINTMENT (OUTPATIENT)
Dept: OCCUPATIONAL THERAPY | Facility: HOSPITAL | Age: 44
End: 2024-04-24
Payer: COMMERCIAL

## 2024-04-29 ENCOUNTER — TREATMENT (OUTPATIENT)
Dept: OCCUPATIONAL THERAPY | Facility: HOSPITAL | Age: 44
End: 2024-04-29
Payer: COMMERCIAL

## 2024-04-29 DIAGNOSIS — M25.731 CARPOMETACARPAL BOSS, RIGHT: ICD-10-CM

## 2024-04-29 DIAGNOSIS — G56.01 CARPAL TUNNEL SYNDROME ON RIGHT: ICD-10-CM

## 2024-04-29 PROCEDURE — 97110 THERAPEUTIC EXERCISES: CPT | Mod: GO,CO

## 2024-04-29 PROCEDURE — 97140 MANUAL THERAPY 1/> REGIONS: CPT | Mod: GO,CO

## 2024-04-29 ASSESSMENT — PAIN SCALES - GENERAL: PAINLEVEL_OUTOF10: 5 - MODERATE PAIN

## 2024-04-29 ASSESSMENT — PAIN - FUNCTIONAL ASSESSMENT: PAIN_FUNCTIONAL_ASSESSMENT: 0-10

## 2024-04-29 NOTE — PROGRESS NOTES
Occupational Therapy    OT Treatment    Patient Name: Ronda Dean  MRN: [id number]  Today's Date: 4/29/2024  Visit 12  Time Calculation  Start Time: 0730  Stop Time: 0830  Time Calculation (min): 60 min             Therapeutic Codes:  OT Therapeutic Procedures Time Entry  Manual Therapy Time Entry: 15  Therapeutic Exercise Time Entry: 45  Current Problem:  1. Carpal tunnel syndrome on right  Follow Up In Occupational Therapy      2. Carpometacarpal boss, right  Follow Up In Occupational Therapy        Assessment:  Pt did well with increase resistance added to hand and pinch strengthening and added to HEP. Pt tolerated STM well today, but still sensitive to touch with less swelling present today.     Plan:  Pt to continue with hand and pinch strengthening and detailed A/AAROM to progress ADL and IADL performances.     Subjective   Pt reports hand is doing better. Pt reports she was able to get outside to do some light yard work and spring decorations which made her feel good. Pt reports she is wearing edema glove a lot more more with noticeable present decrease in edema in R hand today.     Pain:  Pain Assessment  Pain Assessment: 0-10  Pain Score: 5 - Moderate pain  Pain Type: Acute pain  Pain Location: Hand  Pain Orientation: Right    Objective    Exercises: DOS 2/20/24 Reps:   Moist heat with gentle passive stretch Ext   PROM All fingers and wrist to tolerance   AROM Fingers poorly tolerated, gentle wrist   Light hand strengthening Blue foam Block 1x10 reps   A/AAROM Wrist flexion and ext, RD, UD and Supination and Pronation 1x10 reps   Edema Management    Activities:    Foam Block Velcro checks on and off with thumb opposition pinch IF, MF, RF    WB with chair Wrist with digit ext and MCP flexion 1x7 reps each with 30 second holds   Clothes pins 3 finger pinch 4#-6# 2x10 reps       Modalities:                    Manual:    Desensitization and scar management R hand and fingers with PROM 15 minutes                 Functional review:     Completed on: 04/17/24      DPC Measurements:  WFL unless documented below AROM prior to start of OT tx   Index Long Ring Small Thumb   Right 3 3.5 3 3 3.5   Left                 OP EDUCATION:  Education  Individual(s) Educated: Patient  Education Provided: Edema control  Education Comment:  (Pt educated and performed wrist A/AAROM stretches with WB on chair and desensitization with scar management and oppostion pinch added to HEP.)    Goals:  Active       OT Goals       OT Goal 1       Start:  03/11/24    Expected End:  06/03/24       LTG - Patient will indicate/ demonstrate the ability to resume all preinjury ADLs and IADLs without significant limits secondary to decreased ROM, decreased strength and/or pain as indicated by Quickdash score of less than 20%.           OT Goal 2       Start:  03/11/24    Expected End:  06/03/24       Develop and issue HEP to help maximize ROM, strength and tolerance to help maximize return to all pre-onset activities.             OT Goal 3       Start:  03/11/24    Expected End:  06/03/24       Pain to be less than or equal to 2/10 with greater than or equal to 45 minutes activity.           OT Goal 4       Start:  03/11/24    Expected End:  06/03/24       Patient will demonstrate a progressive increase in ROM as appropriate with R wrist  to be within 5-10 degrees of L Wrist to help patient resume normal ADL and IADL function.           OT Goal 5       Start:  03/11/24    Expected End:  06/03/24       Patient will demonstrate a progressive increase in ROM as appropriate with R fingers to be within 5-10 degrees of L fingers to help patient resume normal ADL and IADL function.

## 2024-05-01 ENCOUNTER — APPOINTMENT (OUTPATIENT)
Dept: OCCUPATIONAL THERAPY | Facility: HOSPITAL | Age: 44
End: 2024-05-01
Payer: COMMERCIAL

## 2024-05-03 ENCOUNTER — OFFICE VISIT (OUTPATIENT)
Dept: UROLOGY | Facility: CLINIC | Age: 44
End: 2024-05-03
Payer: COMMERCIAL

## 2024-05-03 DIAGNOSIS — N39.3 SUI (STRESS URINARY INCONTINENCE, FEMALE): ICD-10-CM

## 2024-05-03 DIAGNOSIS — R10.2 PELVIC PAIN: ICD-10-CM

## 2024-05-03 DIAGNOSIS — M62.89 PELVIC FLOOR DYSFUNCTION: ICD-10-CM

## 2024-05-03 DIAGNOSIS — N32.81 OAB (OVERACTIVE BLADDER): Primary | ICD-10-CM

## 2024-05-03 PROCEDURE — 99214 OFFICE O/P EST MOD 30 MIN: CPT | Performed by: STUDENT IN AN ORGANIZED HEALTH CARE EDUCATION/TRAINING PROGRAM

## 2024-05-03 RX ORDER — OSPEMIFENE 60 MG/1
60 TABLET, FILM COATED ORAL DAILY
Qty: 30 TABLET | Refills: 1 | Status: SHIPPED | OUTPATIENT
Start: 2024-05-03 | End: 2025-05-03

## 2024-05-03 NOTE — PROGRESS NOTES
HISTORY OF PRESENT ILLNESS:  Ronda Dean is a 44 y.o. female who presents today for a follow up visit. She reports she still has urgency and urge incontinence. She reports her leakage is fine, but she has pain when laughing with friends. She thinks she fully empties her bladder. She states she will occasionally trickle after urinating. She states sex is painful as well. She states she has tried vaginal estrogen in patches, bills, and vaginally, which caused side effects.            Past Medical History  She has a past medical history of Arthritis, Depression, Dysuria (05/08/2023), GERD (gastroesophageal reflux disease), Hyperlipidemia, unspecified (04/14/2016), Migraines, NAFL (nonalcoholic fatty liver), Neuropathic pain of chest (05/08/2023), Pain of right breast (05/08/2023), Personal history of other diseases of the digestive system (07/07/2020), Sleep apnea, TMJ (dislocation of temporomandibular joint), TMJ (temporomandibular joint disorder), Urinary urgency (05/08/2023), and Vaginal discharge (05/08/2023).    Surgical History  She has a past surgical history that includes Total abdominal hysterectomy (04/16/2015); Cervical biopsy w/ loop electrode excision (04/16/2015); Cholecystectomy (04/16/2015); Hysterectomy (09/07/2021); Other surgical history (07/08/2020); Other surgical history (07/08/2020); Other surgical history (07/08/2020); Other surgical history (02/16/2021); and Other surgical history (02/16/2021).     Social History  She reports that she has never smoked. She has never used smokeless tobacco. She reports that she does not drink alcohol and does not use drugs.    Family History  Family History   Problem Relation Name Age of Onset    Diabetes Father      Hypertension Father      Diabetes Father's Sister      Stomach cancer Father's Sister      Diabetes Paternal Grandmother      Hypertension Paternal Grandmother      Coronary artery disease Paternal Grandmother          MI>60s    Diabetes Paternal  "Cousin          Allergies  Pregabalin, Bupropion, Gabapentin, Hydrochlorothiazide, Naltrexone-bupropion, Ondansetron hcl, Promethazine, Trazodone, and Penicillins      A comprehensive 10+ review of systems was negative except for: see hpi                          PHYSICAL EXAMINATION:  BP Readings from Last 3 Encounters:   04/08/24 104/64   02/20/24 131/88   12/22/23 112/78      Wt Readings from Last 3 Encounters:   04/17/24 72.6 kg (160 lb)   04/08/24 74.4 kg (164 lb)   02/20/24 72.6 kg (160 lb)      BMI: Estimated body mass index is 25.82 kg/m² as calculated from the following:    Height as of 4/17/24: 1.676 m (5' 6\").    Weight as of 4/17/24: 72.6 kg (160 lb).  BSA: Estimated body surface area is 1.84 meters squared as calculated from the following:    Height as of 4/17/24: 1.676 m (5' 6\").    Weight as of 4/17/24: 72.6 kg (160 lb).  HEENT: Normocephalic, atraumatic, PER EOMI, nonicteric, trachea normal, thyroid normal, oropharynx normal.  CARDIAC: regular rate & rhythm, S1 & S2 normal.  No heaves, thrills, gallops or murmurs.  LUNGS: Clear to auscultation, no spinal or CV tenderness.  EXTREMITIES: No evidence of cyanosis, clubbing or edema.       Pelvic exam:   10/10 pain with palpation of b/l LA, L>R and exacerbating pain in lower abdomen with palpation of LA       Assessment:  44-year-old  with dyspareunia, mixed incontinence, hypertonic pelvic floor     Stress  incontinence  s/p sling placement 12/22/2023  Still experiencing some stress incontinence but better than prior,       Urgency:   has failed Myrbetriq  However I feel like a lot of her symptoms are due to hypertonic pelvic floor  We will send back to PT    If she does not improve we will obtain UDS    Chronic pelvic pain:  Start PT  Given uber lube    GUSM:  Has bad reactions with estrogen including vaginal estrogen  Will try Osphena      All questions and concerns were answered and addressed.  The patient expressed understanding and agrees with the " plan.     Johan Phillips MD    Scribe Attestation  By signing my name below, I, Radha Vallecillo, Scribe   attest that this documentation has been prepared under the direction and in the presence of Johan Phillips MD.

## 2024-05-06 ENCOUNTER — APPOINTMENT (OUTPATIENT)
Dept: ORTHOPEDIC SURGERY | Facility: CLINIC | Age: 44
End: 2024-05-06
Payer: COMMERCIAL

## 2024-05-06 ENCOUNTER — TELEPHONE (OUTPATIENT)
Dept: PRIMARY CARE | Facility: CLINIC | Age: 44
End: 2024-05-06

## 2024-05-06 NOTE — TELEPHONE ENCOUNTER
Pt states that she was supposed to call to get bw orders placed. Pt hasn't started Cholesterol medication due to pt having surgery. Will cb to schedule appt once started on med.

## 2024-05-07 ENCOUNTER — APPOINTMENT (OUTPATIENT)
Dept: PRIMARY CARE | Facility: CLINIC | Age: 44
End: 2024-05-07
Payer: COMMERCIAL

## 2024-05-09 NOTE — TELEPHONE ENCOUNTER
[facility] [place]  PROGRESS NOTE  Chief Complaint:   Patient presents with:  Medication Follow-Up: Discuss Sertraline      HPI:   This is a 26year old male coming in for follow-up on his medication.   He is now working full-time at [employer] Patient called again No Lab Order Placed. Patient cancelled her 5/7/2024 appt. Please Advise.   standard drinks      Types: 7 Cans of beer per week    Drug use: No    Family History:  Family History   Family history unknown:  Yes     Allergies:    Latex                   RASH  Sertraline              FATIGUE  Current Meds:    Current Outpatient Medica encounter: 69\". Weight as of this encounter: 157 lb 4 oz. Vital signs reviewed. Physical Exam:  GEN: Wake and alert. He is yawning frequently. He is not in any distress.   HEENT:  Head:  Normocephalic, atraumatic Eyes: EOMI, PERRLA, no scleral icte due on 08/17/2019    Patient/Caregiver Education: Patient/Caregiver Education: There are no barriers to learning. Medical education done. Outcome: Patient verbalizes understanding.  Patient is notified to call with any questions, complications, allergies,

## 2024-05-10 DIAGNOSIS — E78.5 HYPERLIPIDEMIA, UNSPECIFIED HYPERLIPIDEMIA TYPE: Primary | ICD-10-CM

## 2024-05-13 ENCOUNTER — OFFICE VISIT (OUTPATIENT)
Dept: ORTHOPEDIC SURGERY | Facility: CLINIC | Age: 44
End: 2024-05-13
Payer: COMMERCIAL

## 2024-05-13 VITALS — HEIGHT: 66 IN | WEIGHT: 160 LBS | BODY MASS INDEX: 25.71 KG/M2

## 2024-05-13 DIAGNOSIS — M25.731 CARPOMETACARPAL BOSS, RIGHT: ICD-10-CM

## 2024-05-13 DIAGNOSIS — G56.01 CARPAL TUNNEL SYNDROME ON RIGHT: ICD-10-CM

## 2024-05-13 DIAGNOSIS — G56.01 CARPAL TUNNEL SYNDROME ON RIGHT: Primary | ICD-10-CM

## 2024-05-13 PROCEDURE — 99024 POSTOP FOLLOW-UP VISIT: CPT | Performed by: ORTHOPAEDIC SURGERY

## 2024-05-13 ASSESSMENT — PAIN SCALES - GENERAL: PAINLEVEL_OUTOF10: 5 - MODERATE PAIN

## 2024-05-13 ASSESSMENT — PAIN - FUNCTIONAL ASSESSMENT: PAIN_FUNCTIONAL_ASSESSMENT: 0-10

## 2024-05-13 NOTE — PROGRESS NOTES
1 month follow up Right ECTR with dorsal CMC boss excision done 02/20/2024.  She continues to have numbness and swelling.  She has noticed her middle finger is pulling to one side.  She states it hurts to hold things with that hand.

## 2024-05-13 NOTE — PROGRESS NOTES
44 y.o. female presents today for for follow up after right endoscopic carpal tunnel release and dorsal CMC boss excision. The patient reports having some swelling and pain in the right hand.  Reports she did have some hives in her arm after surgery that went away with topical hydrocortisone cream.  Also reports some numbness on the top of her hand.. The DOS was 10 weeks ago. Pain is controlled.  Has been going to occupational therapy without much improvement in her symptoms, though she stopped going.  Did do the steroid taper which helped with some of her swelling.    Review of Systems    Constitutional: see HPI, no fever, no chills, not feeling tired, no significant weight gain or weight loss.   Eyes: No vision changes  ENT: no nosebleeds.   Cardiovascular: no chest pain.   Respiratory: no shortness of breath and no cough.   Gastrointestinal: no abdominal pain, no nausea, no vomiting and no diarrhea.   Musculoskeletal: per HPI  Neurological: no headache, no gait disturbances  Psychiatric: no depression and no sleep disturbances.   Endocrine: no muscle weakness and no muscle cramps.   Hematologic/Lymphatic: no swollen glands and no tendency for easy bruising or excessive swelling.     Patient's past medical history, past surgical history, allergies, and medications have been reviewed unless otherwise noted in the chart.     Post-Op Exam  Inspection:  no evidence of infection, no erythema, moderate edema palpation:  compartments are soft, Range of Motion: 4040 flexion extension, full supination pronation, fingers 10 degrees from full extension, and about 1 cm from full flexion stability: Stable strength:  not tested, Skin:  incision site clean, dry and intact, healing without complication, Vascular:  capillary refill <2 seconds distally, Sensation:  intact to light touch distally, volarly, absent sensation dorsally over her index finger -states it causes her pain, hypersensitive to a degree, intact over her  thumb    Constitutional   General appearance: Alert and in no acute distress. Well developed, well nourished.    Eyes   External Eye, Conjunctiva and lids: Normal external exam - pupils were equal in size, round, reactive to light (PERRL) with normal accommodation and extraocular movements intact (EOMI).   Ears, Nose, Mouth, and Throat   Hearing: Normal.   Neck   Neck: No neck mass was observed. Supple.   Pulmonary   Respiratory effort: No respiratory distress.   Cardiovascular   Examination of extremities: No peripheral edema.   Psychiatric   Judgment and insight: Intact.   Orientation to person, place, and time: Alert and oriented x 3.       Mood and affect: Normal.      10 weeks status post endoscopic carpal tunnel release and dorsal boss excision on the right with possible complex regional pain syndrome  Based on the history, physical exam and imaging studies above, the patient's presentation is consistent with the above diagnosis.  I had a long discussion with the patient regarding their presentation and the treatment options.    We again discussed her treatment options going forward along with their associated risks and benefits. After thorough discussion, the patient has elected to proceed with postoperative care. All questions were answered to the patients satisfaction who seems satisfied with the plan.  They will call the office with any questions/concerns.     Vitamin E cream prn to scar tissue  Activities as tolerated  OT- E/T -she stopped going to therapy because she did not like the place she was going for, I discussed with her the importance of this and she is willing to try a new facility for therapy  Steroid taper -completed  Mobic 15 mg once daily  Again, her hand appears to be presenting similar to that of complex regional pain syndrome therefore we will do a steroid taper and started on an anti-inflammatory while continuing therapy and see her back in a month to evaluate her symptoms  FU 4 weeks -  no XR

## 2024-05-20 ENCOUNTER — APPOINTMENT (OUTPATIENT)
Dept: PHYSICAL THERAPY | Facility: HOSPITAL | Age: 44
End: 2024-05-20
Payer: COMMERCIAL

## 2024-05-21 NOTE — PROGRESS NOTES
Patient ID: Ronda Dean is a 44 y.o. female who presents for No chief complaint on file..    Referring Provider: Dr. Phillips    Pt was referred for osphena   Last visit with referring provider: 5/3/24    Preferred Pharmacy:    [facility]/pharmacy #[id number] [place], [place] - [address] [place] [address] AT CORNER OF [address] [address]  Phone: [phone] Fax: [phone]     [facility]  [address] [facility] [address]  Phone: [phone] Fax: [phone]      Copay assistance:   Do you have copays on your medications?   Do you have trouble affording your current medications?      Patient Assistance Screening (VAF)    Patient verbally reports monthly or yearly income which is less than 400% federal poverty level   Application for program has been submitted for the following medications: ***  Patient has been informed that program team will be reaching out to them to discuss necessary documentation, instructed to answer phone/return voicemail.   Patient aware this process may take up to 6 weeks.   If approved medication must be filled through [facility] pharmacy and may be picked up or mailed to patient.       Subjective      LMP:   Days of bleeding:   Symptoms:   Frequency:    H/O pregnancy:   Any plans for a pregnancy in the next year:     Menopausal age:   <60 years of age or within 10 years of menopause   Uterus:       Any Contraindications and/or Cautions to HT:   PH/FH breast cancer:  Estrogen sensitive?  PH/FH of ovarian cancer:   PH/FH of uterine cancer:   PH/FH of colon cancer:   PH/FH of pancreatic cancer:   PH/FH Dementia:   Stroke, MI, VTE, clotting disorder, or congenital heart disease:  Systemic lupus erythematosus (increased clot risk):   H/o Gallbladder disease?   Tobacco?   Alcohol?       Nutrition:   Goal: 21+ grams of fiber daily  Focus on whole foods, colorful, diversity  Omega-3's - low mercury, S.M.A.S.H. fish, 2 servings/week or supplement  Limit saturated fats,  "refined carbs/simple sugars, caffeine/alcohol    Movement:   Joint pain?   Strength training?   Sweat?     Sleep:   Daytime brain fog/memory troubles?  Stress:     Social Connection:       GI issues?   Frequency of BM?  Histamine?   Hx Migraine?       Vaginal Symptoms  Dyspareunia (pain before/during/after intercourse):   Vaginal Bleeding:   Vaginal Dryness:   Dysuria (pain, burning, stinging, or itching with urination):   Vaginal and/or vulvar irritation/itching:   Recurrent urinary tract infections:   Recurrent yeast infections:   No results found for: \"ESTRADIOLFRE\", \"PROGESTERONE\", \"ESTRADIOL\", \"FSH\"    Has bad reactions with estrogen including vaginal estrogen  Will try Osphena    Non Pharm Mgmt:   Uber-lube  Previous Treatment:   Estradiol ***  Current Treatment:   Osphena    Vasomotor symptoms   Frequency:   Triggers:     Non-Pharm Mgmt:     Previous Treatment:     Current Treatment:     Lab Results   Component Value Date    AST 36 04/08/2024    AST 22 02/02/2024    AST 31 10/08/2023    ALT 59 (H) 04/08/2024    ALT 31 02/02/2024    ALT 43 10/08/2023    GGT 26 10/27/2021       Libido    Previous Treatment:     Current Treatment:     No results found for: \"TESTOSTERONE\"    Urinary Incontinence   Has failed Myrbetriq, trying PT for pelvic floor  Frequency:   Urgency:   Incontinence:     Non-Pharm Mgmt:     Previous Treatment:     Current Treatment:     Lab Results   Component Value Date    CREATININE 0.79 04/08/2024    GFRF >90 08/07/2023       Bone Health  Osteopenia or osteoporosis:   Fracture history:   Estradiol can be used as an alternative to first-line therapies to prevent bone loss in patients with moderate to severe vasomotor symptoms of menopause. In patients without menopausal vasomotor symptoms who require fracture risk reduction, other first-line therapies are preferred.   Avoid initiating in patients >60 years of age or who are >10 years beyond menopause.   Ensure adequate calcium and vitamin D " "intake during therapy.    Previous Treatment:     Current Treatment:             Cardiovascular Health  The 10-year ASCVD risk score (Lynn HINDS, et al., 2019) is: 0.6%    Values used to calculate the score:      Age: 44 years      Sex: Female      Is Non- : No      Diabetic: No      Tobacco smoker: No      Systolic Blood Pressure: 104 mmHg      Is BP treated: No      HDL Cholesterol: 54.9 mg/dL      Total Cholesterol: 226 mg/dL    Lab Results   Component Value Date    CHOL 226 (H) 02/02/2024     Lab Results   Component Value Date    HDL 54.9 02/02/2024     Lab Results   Component Value Date    LDLCALC 147 (H) 02/02/2024     Lab Results   Component Value Date    TRIG 122 02/02/2024     No components found for: \"CHOLHDL\"   BP Readings from Last 3 Encounters:   04/08/24 104/64   02/20/24 131/88   12/22/23 112/78      Estrogen compounds are generally associated with lipid effects such as increased HDL-cholesterol and decreased LDL-cholesterol. Triglycerides may also be increased in patients with preexisting hypertriglyceridemia.    Blood Sugar Balance  Lab Results   Component Value Date    GLUCOSE 82 04/08/2024    HGBA1C 4.6 06/03/2023    HGBA1C 4.9 04/23/2021       Thyroid  Lab Results   Component Value Date    TSH 1.50 04/08/2024    THYROIDPAB 28 08/22/2018      Thyroid disorders may cause similar symptoms to menopause. Oral HRT and phytoestrogen supplements do not affect normal thyroid function; however, they may impact doses of thyroid medication in women treated for hypothyroidism. HRT given through the skin by gel, patch or spray do not impact thyroid replacement doses however.    Iron Status  Lab Results   Component Value Date    IRON 76 09/19/2023    TIBC 340 09/19/2023    FERRITIN 199 (H) 09/19/2023        Potassium  Lab Results   Component Value Date    K 4.3 04/08/2024        Vitamin D3  Lab Results   Component Value Date    VITD25 27 (A) 08/07/2023       Current Outpatient Medications " on File Prior to Visit   Medication Sig Dispense Refill    acetaminophen (Tylenol) 500 mg tablet Take 2 tablets (1,000 mg) by mouth every 6 hours if needed for mild pain (1 - 3). 30 tablet 0    atorvastatin (Lipitor) 10 mg tablet Take 1 tablet (10 mg) by mouth once daily. (Patient not taking: Reported on 4/17/2024) 30 tablet 0    cholecalciferol (Vitamin D-3) 50 MCG (2000 UT) tablet Take 1 tablet (50 mcg) by mouth once daily.      famotidine (Pepcid) 40 mg tablet Take 1 tablet (40 mg) by mouth once daily at bedtime.      ibuprofen 600 mg tablet Take 1 tablet (600 mg) by mouth every 8 hours if needed. W/ food and water      Lactobacillus acidophilus (PROBIOTIC ACIDOPHILUS ORAL) Probiotic Acidophilus oral capsules      meloxicam (Mobic) 15 mg tablet Take 1 tablet (15 mg) by mouth once daily. (Patient not taking: Reported on 5/13/2024) 30 tablet 11    omeprazole (PriLOSEC) 40 mg DR capsule Take 1 capsule (40 mg) by mouth once daily.      ospemifene (Osphena) 60 mg tablet Take 60 mg by mouth once daily. 30 tablet 1    QUEtiapine (SEROquel) 25 mg tablet Take 1 tablet (25 mg) by mouth once daily at bedtime. (Patient not taking: Reported on 4/17/2024) 30 tablet 0    semaglutide, weight loss, (Wegovy) 0.25 mg/0.5 mL pen injector Inject 0.25 mg under the skin. 2 mL 0    VITAMIN B COMPLEX ORAL Vitamin B Complex oral tablets       No current facility-administered medications on file prior to visit.        Medication and allergy reconciliation completed ***    Drug Interactions ***  No significant drug interactions identified    Assessment/Plan         START    CONTINUE  Progesterone 100mg or 200mg ***   Dosed either cyclically (preferred in late menopausal transition or early postmenopause) or continuously (preferred if >2 to 3 years postmenopause)  LABS      Follow-up: ***     Time spent with pt: Total length of time *** (minutes) of the encounter and more than 50% was spent counseling the patient.      Tami Shook, Pharm.D,  [facility]  Clinical Pharmacist  Pharmacy Services  [phone]    Continue all meds under the continuation of care with the referring provider and clinical pharmacy team.    Verbal consent to manage patient's drug therapy was obtained from the patient and/or an individual authorized to act on behalf of a patient. They were informed they may decline to participate or withdraw from participation in pharmacy services at any time.

## 2024-05-22 ENCOUNTER — APPOINTMENT (OUTPATIENT)
Dept: PHARMACY | Facility: HOSPITAL | Age: 44
End: 2024-05-22
Payer: COMMERCIAL

## 2024-06-10 ENCOUNTER — OFFICE VISIT (OUTPATIENT)
Dept: ORTHOPEDIC SURGERY | Facility: CLINIC | Age: 44
End: 2024-06-10
Payer: COMMERCIAL

## 2024-06-10 DIAGNOSIS — G56.01 CARPAL TUNNEL SYNDROME ON RIGHT: Primary | ICD-10-CM

## 2024-06-10 DIAGNOSIS — G56.03 CARPAL TUNNEL SYNDROME, BILATERAL: ICD-10-CM

## 2024-06-10 DIAGNOSIS — M25.731 CARPOMETACARPAL BOSS, RIGHT: ICD-10-CM

## 2024-06-10 PROCEDURE — 99214 OFFICE O/P EST MOD 30 MIN: CPT | Performed by: ORTHOPAEDIC SURGERY

## 2024-06-10 PROCEDURE — 1036F TOBACCO NON-USER: CPT | Performed by: ORTHOPAEDIC SURGERY

## 2024-06-10 RX ORDER — METHYLPREDNISOLONE 4 MG/1
TABLET ORAL
Qty: 21 TABLET | Refills: 0 | Status: SHIPPED | OUTPATIENT
Start: 2024-06-10

## 2024-06-10 NOTE — PROGRESS NOTES
Follow up right endoscopic carpal tunnel release and dorsal CMC boss excision done on 2/20/2024  Patient has been back to work since 5/15/2024 and doing well

## 2024-06-10 NOTE — PROGRESS NOTES
44 y.o. female presents today for for follow up after right endoscopic carpal tunnel release and dorsal CMC boss excision. The patient reports having some swelling and pain in the right hand.  Reports she did have some hives in her arm after surgery that went away with topical hydrocortisone cream.  Also reports some numbness on the top of her hand.. The DOS was 14 weeks ago. Pain is controlled.  Has been going to occupational therapy without much improvement in her symptoms, though she stopped going.  Did do the steroid taper which helped with some of her swelling.  Has been going to therapy now again.  Is back to work also.    Review of Systems    Constitutional: see HPI, no fever, no chills, not feeling tired, no significant weight gain or weight loss.   Eyes: No vision changes  ENT: no nosebleeds.   Cardiovascular: no chest pain.   Respiratory: no shortness of breath and no cough.   Gastrointestinal: no abdominal pain, no nausea, no vomiting and no diarrhea.   Musculoskeletal: per HPI  Neurological: no headache, no gait disturbances  Psychiatric: no depression and no sleep disturbances.   Endocrine: no muscle weakness and no muscle cramps.   Hematologic/Lymphatic: no swollen glands and no tendency for easy bruising or excessive swelling.     Patient's past medical history, past surgical history, allergies, and medications have been reviewed unless otherwise noted in the chart.     Post-Op Exam  Inspection:  no evidence of infection, no erythema, moderate edema palpation:  compartments are soft, Range of Motion: 40/40 flexion extension, full supination pronation, fingers 10 degrees from full extension, and about 1 cm from full flexion stability: Stable strength:  not tested, Skin:  incision site clean, dry and intact, healing without complication, Vascular:  capillary refill <2 seconds distally, Sensation:  intact to light touch distally, volarly,  intact but decrease sensation dorsally over her index finger  dorsal central hand - states it causes her pain, hypersensitive to a degree, intact over her thumb    Constitutional   General appearance: Alert and in no acute distress. Well developed, well nourished.    Eyes   External Eye, Conjunctiva and lids: Normal external exam - pupils were equal in size, round, reactive to light (PERRL) with normal accommodation and extraocular movements intact (EOMI).   Ears, Nose, Mouth, and Throat   Hearing: Normal.   Neck   Neck: No neck mass was observed. Supple.   Pulmonary   Respiratory effort: No respiratory distress.   Cardiovascular   Examination of extremities: No peripheral edema.   Psychiatric   Judgment and insight: Intact.   Orientation to person, place, and time: Alert and oriented x 3.       Mood and affect: Normal.      14 weeks status post endoscopic carpal tunnel release and dorsal boss excision on the right with possible complex regional pain syndrome  Based on the history, physical exam and imaging studies above, the patient's presentation is consistent with the above diagnosis.  I had a long discussion with the patient regarding their presentation and the treatment options.    We again discussed her treatment options going forward along with their associated risks and benefits. After thorough discussion, the patient has elected to proceed with postoperative care. All questions were answered to the patients satisfaction who seems satisfied with the plan.  They will call the office with any questions/concerns.     Vitamin E cream prn to scar tissue  Activities as tolerated  OT- E/T -she stopped going to therapy because she did not like the place she was going for, I discussed with her the importance of this and she is willing to try a new facility for therapy -states she has been going to the new facility  Steroid taper  Mobic 15 mg once daily  Again, her hand appears to be presenting similar to that of complex regional pain syndrome therefore we will do a steroid  taper and started on an anti-inflammatory while continuing therapy and see her back in a month to evaluate her symptoms  Refer to Dr. Edwards for a second opinion  FU 8 weeks - no XR

## 2024-06-11 ENCOUNTER — APPOINTMENT (OUTPATIENT)
Dept: PHYSICAL THERAPY | Facility: HOSPITAL | Age: 44
End: 2024-06-11
Payer: COMMERCIAL

## 2024-06-15 ENCOUNTER — LAB (OUTPATIENT)
Dept: LAB | Facility: LAB | Age: 44
End: 2024-06-15
Payer: COMMERCIAL

## 2024-06-15 DIAGNOSIS — E78.5 HYPERLIPIDEMIA, UNSPECIFIED HYPERLIPIDEMIA TYPE: ICD-10-CM

## 2024-06-15 LAB
ALBUMIN SERPL BCP-MCNC: 4.8 G/DL (ref 3.4–5)
ALP SERPL-CCNC: 70 U/L (ref 33–110)
ALT SERPL W P-5'-P-CCNC: 25 U/L (ref 7–45)
ANION GAP SERPL CALC-SCNC: 13 MMOL/L (ref 10–20)
AST SERPL W P-5'-P-CCNC: 21 U/L (ref 9–39)
BILIRUB SERPL-MCNC: 1.3 MG/DL (ref 0–1.2)
BUN SERPL-MCNC: 16 MG/DL (ref 6–23)
CALCIUM SERPL-MCNC: 9.6 MG/DL (ref 8.6–10.3)
CHLORIDE SERPL-SCNC: 109 MMOL/L (ref 98–107)
CHOLEST SERPL-MCNC: 201 MG/DL (ref 0–199)
CHOLESTEROL/HDL RATIO: 3.9
CO2 SERPL-SCNC: 24 MMOL/L (ref 21–32)
CREAT SERPL-MCNC: 0.77 MG/DL (ref 0.5–1.05)
EGFRCR SERPLBLD CKD-EPI 2021: >90 ML/MIN/1.73M*2
GLUCOSE SERPL-MCNC: 75 MG/DL (ref 74–99)
HDLC SERPL-MCNC: 50.9 MG/DL
LDLC SERPL CALC-MCNC: 120 MG/DL
NON HDL CHOLESTEROL: 150 MG/DL (ref 0–149)
POTASSIUM SERPL-SCNC: 4.6 MMOL/L (ref 3.5–5.3)
PROT SERPL-MCNC: 6.8 G/DL (ref 6.4–8.2)
SODIUM SERPL-SCNC: 141 MMOL/L (ref 136–145)
TRIGL SERPL-MCNC: 150 MG/DL (ref 0–149)
VLDL: 30 MG/DL (ref 0–40)

## 2024-06-15 PROCEDURE — 80061 LIPID PANEL: CPT

## 2024-06-15 PROCEDURE — 36415 COLL VENOUS BLD VENIPUNCTURE: CPT

## 2024-06-15 PROCEDURE — 80053 COMPREHEN METABOLIC PANEL: CPT

## 2024-06-24 ENCOUNTER — APPOINTMENT (OUTPATIENT)
Dept: ORTHOPEDIC SURGERY | Facility: CLINIC | Age: 44
End: 2024-06-24
Payer: COMMERCIAL

## 2024-06-24 ENCOUNTER — APPOINTMENT (OUTPATIENT)
Dept: PRIMARY CARE | Facility: CLINIC | Age: 44
End: 2024-06-24
Payer: COMMERCIAL

## 2024-07-01 ENCOUNTER — APPOINTMENT (OUTPATIENT)
Dept: ORTHOPEDIC SURGERY | Facility: CLINIC | Age: 44
End: 2024-07-01
Payer: COMMERCIAL

## 2024-07-02 ENCOUNTER — HOSPITAL ENCOUNTER (OUTPATIENT)
Dept: RADIOLOGY | Facility: HOSPITAL | Age: 44
Discharge: HOME | End: 2024-07-02
Payer: COMMERCIAL

## 2024-07-02 VITALS — BODY MASS INDEX: 24.91 KG/M2 | HEIGHT: 66 IN | WEIGHT: 155 LBS

## 2024-07-02 DIAGNOSIS — Z12.31 SCREENING MAMMOGRAM FOR BREAST CANCER: ICD-10-CM

## 2024-07-02 PROCEDURE — 77063 BREAST TOMOSYNTHESIS BI: CPT | Performed by: RADIOLOGY

## 2024-07-02 PROCEDURE — 77067 SCR MAMMO BI INCL CAD: CPT | Performed by: RADIOLOGY

## 2024-07-02 PROCEDURE — 77067 SCR MAMMO BI INCL CAD: CPT

## 2024-07-03 ENCOUNTER — APPOINTMENT (OUTPATIENT)
Dept: PRIMARY CARE | Facility: CLINIC | Age: 44
End: 2024-07-03
Payer: COMMERCIAL

## 2024-07-09 ENCOUNTER — APPOINTMENT (OUTPATIENT)
Dept: PRIMARY CARE | Facility: CLINIC | Age: 44
End: 2024-07-09
Payer: COMMERCIAL

## 2024-07-15 ENCOUNTER — APPOINTMENT (OUTPATIENT)
Dept: ORTHOPEDIC SURGERY | Facility: CLINIC | Age: 44
End: 2024-07-15
Payer: COMMERCIAL

## 2024-07-24 ENCOUNTER — TELEPHONE (OUTPATIENT)
Dept: ORTHOPEDIC SURGERY | Facility: CLINIC | Age: 44
End: 2024-07-24
Payer: COMMERCIAL

## 2024-07-24 NOTE — TELEPHONE ENCOUNTER
Attempted to call patient, no answer. Left voicemail to return call.    Wanted to know if she could move up her appointment on 8/5/24 to 1:15PM

## 2024-08-02 ENCOUNTER — APPOINTMENT (OUTPATIENT)
Dept: UROLOGY | Facility: CLINIC | Age: 44
End: 2024-08-02
Payer: COMMERCIAL

## 2024-08-05 ENCOUNTER — APPOINTMENT (OUTPATIENT)
Dept: ORTHOPEDIC SURGERY | Facility: CLINIC | Age: 44
End: 2024-08-05
Payer: COMMERCIAL

## 2024-08-06 ENCOUNTER — APPOINTMENT (OUTPATIENT)
Dept: PRIMARY CARE | Facility: CLINIC | Age: 44
End: 2024-08-06
Payer: COMMERCIAL

## 2024-08-19 ENCOUNTER — APPOINTMENT (OUTPATIENT)
Dept: ORTHOPEDIC SURGERY | Facility: CLINIC | Age: 44
End: 2024-08-19
Payer: COMMERCIAL

## 2024-08-20 ENCOUNTER — TELEPHONE (OUTPATIENT)
Dept: UROLOGY | Facility: CLINIC | Age: 44
End: 2024-08-20
Payer: COMMERCIAL

## 2024-08-20 DIAGNOSIS — N39.0 RECURRENT UTI: ICD-10-CM

## 2024-08-20 DIAGNOSIS — N39.0 RECURRENT UTI: Primary | ICD-10-CM

## 2024-08-20 RX ORDER — PHENAZOPYRIDINE HYDROCHLORIDE 200 MG/1
200 TABLET, FILM COATED ORAL 3 TIMES DAILY
Qty: 10 TABLET | Refills: 0 | Status: SHIPPED | OUTPATIENT
Start: 2024-08-20 | End: 2024-08-21

## 2024-08-20 RX ORDER — CEPHALEXIN 500 MG/1
500 CAPSULE ORAL 2 TIMES DAILY
Qty: 14 CAPSULE | Refills: 0 | Status: SHIPPED | OUTPATIENT
Start: 2024-08-20 | End: 2024-08-27

## 2024-08-20 RX ORDER — SULFAMETHOXAZOLE AND TRIMETHOPRIM 800; 160 MG/1; MG/1
1 TABLET ORAL 2 TIMES DAILY
Qty: 10 TABLET | Refills: 0 | Status: SHIPPED | OUTPATIENT
Start: 2024-08-20 | End: 2024-08-20

## 2024-08-20 NOTE — TELEPHONE ENCOUNTER
Patient calling in stated she went to a Urgent Care over the weekend for a UTI -- She stated they gave her (Macrobid) for her symptoms, she thinks the antibiotic is making her sick and it is not helping with the UTI. She is wanting to know if we can put a UA in the system for her to go do and maybe something else she can take for it. She is in a lot of pain when urinating.

## 2024-08-21 RX ORDER — PHENAZOPYRIDINE HYDROCHLORIDE 200 MG/1
200 TABLET, FILM COATED ORAL 3 TIMES DAILY
Qty: 10 TABLET | Refills: 0 | Status: SHIPPED | OUTPATIENT
Start: 2024-08-21

## 2024-08-23 ENCOUNTER — TELEPHONE (OUTPATIENT)
Dept: UROLOGY | Facility: CLINIC | Age: 44
End: 2024-08-23

## 2024-08-23 ENCOUNTER — LAB (OUTPATIENT)
Dept: LAB | Facility: LAB | Age: 44
End: 2024-08-23
Payer: COMMERCIAL

## 2024-08-23 DIAGNOSIS — N39.0 RECURRENT UTI: ICD-10-CM

## 2024-08-23 PROCEDURE — 87086 URINE CULTURE/COLONY COUNT: CPT

## 2024-08-23 NOTE — TELEPHONE ENCOUNTER
Patient going to get culture done today, she is still not feeling well, in a lot of pain, wondering if it is something other than a UTI  She just wanted you to know, and will wait until results are in from culture  Thank you

## 2024-08-24 LAB — BACTERIA UR CULT: NO GROWTH

## 2024-08-26 DIAGNOSIS — N39.0 RECURRENT UTI: Primary | ICD-10-CM

## 2024-08-26 RX ORDER — CIPROFLOXACIN 500 MG/1
500 TABLET ORAL 2 TIMES DAILY
Qty: 10 TABLET | Refills: 0 | Status: SHIPPED | OUTPATIENT
Start: 2024-08-26 | End: 2024-08-31

## 2024-09-10 ENCOUNTER — TELEPHONE (OUTPATIENT)
Dept: UROLOGY | Facility: CLINIC | Age: 44
End: 2024-09-10
Payer: COMMERCIAL

## 2024-09-10 NOTE — TELEPHONE ENCOUNTER
Patient calling in stated after three rounds of antibiotics that this still isn't working. She is wondering what you would like to do now. She is also experiencing a lot of back pain too

## 2024-09-17 ENCOUNTER — APPOINTMENT (OUTPATIENT)
Dept: UROLOGY | Facility: CLINIC | Age: 44
End: 2024-09-17
Payer: COMMERCIAL

## 2024-09-17 DIAGNOSIS — R10.2 PELVIC PAIN: ICD-10-CM

## 2024-09-17 DIAGNOSIS — N95.8 GENITOURINARY SYNDROME OF MENOPAUSE: ICD-10-CM

## 2024-09-17 DIAGNOSIS — N32.81 OAB (OVERACTIVE BLADDER): ICD-10-CM

## 2024-09-17 DIAGNOSIS — M62.89 PELVIC FLOOR DYSFUNCTION: Primary | ICD-10-CM

## 2024-09-17 PROCEDURE — 99214 OFFICE O/P EST MOD 30 MIN: CPT | Performed by: STUDENT IN AN ORGANIZED HEALTH CARE EDUCATION/TRAINING PROGRAM

## 2024-09-17 RX ORDER — TIZANIDINE 4 MG/1
4 TABLET ORAL EVERY 6 HOURS PRN
Qty: 60 TABLET | Refills: 11 | Status: SHIPPED | OUTPATIENT
Start: 2024-09-17 | End: 2025-03-16

## 2024-09-17 NOTE — PROGRESS NOTES
HISTORY OF PRESENT ILLNESS:  Ronda Dean is a 44 y.o. female who presents today for a follow up visit. She is having pelvic pain and is concerned it could be related to the sling. She reports she has some difficulty emptying her bladder as well.          Past Medical History  She has a past medical history of Arthritis, Depression, Dysuria (05/08/2023), GERD (gastroesophageal reflux disease), Hyperlipidemia, unspecified (04/14/2016), Migraines, NAFL (nonalcoholic fatty liver), Neuropathic pain of chest (05/08/2023), Pain of right breast (05/08/2023), Personal history of other diseases of the digestive system (07/07/2020), Sleep apnea, TMJ (dislocation of temporomandibular joint), TMJ (temporomandibular joint disorder), Urinary urgency (05/08/2023), and Vaginal discharge (05/08/2023).    Surgical History  She has a past surgical history that includes Total abdominal hysterectomy (04/16/2015); Cervical biopsy w/ loop electrode excision (04/16/2015); Cholecystectomy (04/16/2015); Hysterectomy (09/07/2021); Other surgical history (07/08/2020); Other surgical history (07/08/2020); Other surgical history (07/08/2020); Other surgical history (02/16/2021); and Other surgical history (02/16/2021).     Social History  She reports that she has never smoked. She has never used smokeless tobacco. She reports that she does not drink alcohol and does not use drugs.    Family History  Family History   Problem Relation Name Age of Onset    Diabetes Father      Hypertension Father      Diabetes Father's Sister      Stomach cancer Father's Sister      Diabetes Paternal Grandmother      Hypertension Paternal Grandmother      Coronary artery disease Paternal Grandmother          MI>60s    Diabetes Paternal Cousin          Allergies  Pregabalin, Bupropion, Gabapentin, Hydrochlorothiazide, Naltrexone-bupropion, Ondansetron hcl, Promethazine, Trazodone, and Penicillins      A comprehensive 10+ review of systems was negative except for:  "see hpi                          PHYSICAL EXAMINATION:  BP Readings from Last 3 Encounters:   06/21/24 115/84   04/08/24 104/64   02/20/24 131/88      Wt Readings from Last 3 Encounters:   07/02/24 70.3 kg (155 lb)   05/13/24 72.6 kg (160 lb)   04/17/24 72.6 kg (160 lb)      BMI: Estimated body mass index is 25.02 kg/m² as calculated from the following:    Height as of 7/2/24: 1.676 m (5' 6\").    Weight as of 7/2/24: 70.3 kg (155 lb).  BSA: Estimated body surface area is 1.81 meters squared as calculated from the following:    Height as of 7/2/24: 1.676 m (5' 6\").    Weight as of 7/2/24: 70.3 kg (155 lb).  HEENT: Normocephalic, atraumatic, PER EOMI, nonicteric, trachea normal, thyroid normal, oropharynx normal.  CARDIAC: regular rate & rhythm, S1 & S2 normal.  No heaves, thrills, gallops or murmurs.  LUNGS: Clear to auscultation, no spinal or CV tenderness.  EXTREMITIES: No evidence of cyanosis, clubbing or edema.    PVR 35  No POP  No tenderness with palpation of urethra  10/10 pain with palpation of LA           Assessment:  44-year-old  with dyspareunia, mixed incontinence, hypertonic pelvic floor     Stress  incontinence  s/p sling placement 12/22/2023  Still experiencing some stress incontinence but better than prior,        Urgency:   has failed Myrbetriq  However I feel like a lot of her symptoms are due to hypertonic pelvic floor  We will send back to PT     If she does not improve we will obtain UDS     Chronic pelvic pain:  Start PT  Given uber lube  Referral to pelvic floor physical therapy for relaxation   Rx zanaflex   If still having urinary symptoms will repeat UDS      GUSM:  Has bad reactions with estrogen including vaginal estrogen  We can try Osphena if persists             Follow up in 2 to 3 months       All questions and concerns were answered and addressed.  The patient expressed understanding and agrees with the plan.     Johan Phillips MD    Scribe Attestation  By signing my name below, I, " Vicky Sotomayor   attest that this documentation has been prepared under the direction and in the presence of Johan Phillips MD.

## 2024-09-19 ENCOUNTER — TELEPHONE (OUTPATIENT)
Dept: OBSTETRICS AND GYNECOLOGY | Facility: CLINIC | Age: 44
End: 2024-09-19
Payer: COMMERCIAL

## 2024-09-19 NOTE — TELEPHONE ENCOUNTER
Patient called stating she has been experiencing UTI for the last month and a half. Stating its been on and off. Patient is stating the last couple of days been experiencing yellow discharge. Please advise on where to schedule patient.

## 2024-09-23 ENCOUNTER — HOSPITAL ENCOUNTER (EMERGENCY)
Facility: HOSPITAL | Age: 44
Discharge: HOME | End: 2024-09-23
Payer: COMMERCIAL

## 2024-09-23 ENCOUNTER — APPOINTMENT (OUTPATIENT)
Dept: RADIOLOGY | Facility: HOSPITAL | Age: 44
End: 2024-09-23
Payer: COMMERCIAL

## 2024-09-23 VITALS
OXYGEN SATURATION: 100 % | DIASTOLIC BLOOD PRESSURE: 78 MMHG | SYSTOLIC BLOOD PRESSURE: 113 MMHG | RESPIRATION RATE: 16 BRPM | TEMPERATURE: 98 F | HEIGHT: 66 IN | WEIGHT: 142 LBS | BODY MASS INDEX: 22.82 KG/M2 | HEART RATE: 71 BPM

## 2024-09-23 DIAGNOSIS — R10.30 LOWER ABDOMINAL PAIN: Primary | ICD-10-CM

## 2024-09-23 LAB
ANION GAP SERPL CALC-SCNC: 9 MMOL/L (ref 10–20)
APPEARANCE UR: CLEAR
BASOPHILS # BLD AUTO: 0.04 X10*3/UL (ref 0–0.1)
BASOPHILS NFR BLD AUTO: 0.6 %
BILIRUB UR STRIP.AUTO-MCNC: NEGATIVE MG/DL
BUN SERPL-MCNC: 14 MG/DL (ref 6–23)
CALCIUM SERPL-MCNC: 9.2 MG/DL (ref 8.6–10.3)
CHLORIDE SERPL-SCNC: 107 MMOL/L (ref 98–107)
CLUE CELLS SPEC QL WET PREP: NORMAL
CO2 SERPL-SCNC: 27 MMOL/L (ref 21–32)
COLOR UR: ABNORMAL
CREAT SERPL-MCNC: 0.96 MG/DL (ref 0.5–1.05)
EGFRCR SERPLBLD CKD-EPI 2021: 76 ML/MIN/1.73M*2
EOSINOPHIL # BLD AUTO: 0.09 X10*3/UL (ref 0–0.7)
EOSINOPHIL NFR BLD AUTO: 1.3 %
ERYTHROCYTE [DISTWIDTH] IN BLOOD BY AUTOMATED COUNT: 12.2 % (ref 11.5–14.5)
GLUCOSE SERPL-MCNC: 73 MG/DL (ref 74–99)
GLUCOSE UR STRIP.AUTO-MCNC: NORMAL MG/DL
HCT VFR BLD AUTO: 44.3 % (ref 36–46)
HGB BLD-MCNC: 14.8 G/DL (ref 12–16)
HOLD SPECIMEN: NORMAL
IMM GRANULOCYTES # BLD AUTO: 0.01 X10*3/UL (ref 0–0.7)
IMM GRANULOCYTES NFR BLD AUTO: 0.1 % (ref 0–0.9)
KETONES UR STRIP.AUTO-MCNC: NEGATIVE MG/DL
LEUKOCYTE ESTERASE UR QL STRIP.AUTO: ABNORMAL
LYMPHOCYTES # BLD AUTO: 2.15 X10*3/UL (ref 1.2–4.8)
LYMPHOCYTES NFR BLD AUTO: 31.3 %
MCH RBC QN AUTO: 29.8 PG (ref 26–34)
MCHC RBC AUTO-ENTMCNC: 33.4 G/DL (ref 32–36)
MCV RBC AUTO: 89 FL (ref 80–100)
MONOCYTES # BLD AUTO: 0.41 X10*3/UL (ref 0.1–1)
MONOCYTES NFR BLD AUTO: 6 %
MUCOUS THREADS #/AREA URNS AUTO: NORMAL /LPF
NEUTROPHILS # BLD AUTO: 4.18 X10*3/UL (ref 1.2–7.7)
NEUTROPHILS NFR BLD AUTO: 60.7 %
NITRITE UR QL STRIP.AUTO: NEGATIVE
NRBC BLD-RTO: 0 /100 WBCS (ref 0–0)
PH UR STRIP.AUTO: 5.5 [PH]
PLATELET # BLD AUTO: 153 X10*3/UL (ref 150–450)
POTASSIUM SERPL-SCNC: 4.3 MMOL/L (ref 3.5–5.3)
PROT UR STRIP.AUTO-MCNC: NEGATIVE MG/DL
RBC # BLD AUTO: 4.96 X10*6/UL (ref 4–5.2)
RBC # UR STRIP.AUTO: NEGATIVE /UL
RBC #/AREA URNS AUTO: NORMAL /HPF
SODIUM SERPL-SCNC: 139 MMOL/L (ref 136–145)
SP GR UR STRIP.AUTO: 1.02
SQUAMOUS #/AREA URNS AUTO: NORMAL /HPF
T VAGINALIS SPEC QL WET PREP: NORMAL
TRICHOMONAS REFLEX COMMENT: NORMAL
UROBILINOGEN UR STRIP.AUTO-MCNC: NORMAL MG/DL
WBC # BLD AUTO: 6.9 X10*3/UL (ref 4.4–11.3)
WBC #/AREA URNS AUTO: NORMAL /HPF
WBC VAG QL WET PREP: NORMAL
YEAST VAG QL WET PREP: NORMAL

## 2024-09-23 PROCEDURE — 74176 CT ABD & PELVIS W/O CONTRAST: CPT

## 2024-09-23 PROCEDURE — 87086 URINE CULTURE/COLONY COUNT: CPT | Mod: PORLAB | Performed by: NURSE PRACTITIONER

## 2024-09-23 PROCEDURE — 74176 CT ABD & PELVIS W/O CONTRAST: CPT | Performed by: RADIOLOGY

## 2024-09-23 PROCEDURE — 2500000004 HC RX 250 GENERAL PHARMACY W/ HCPCS (ALT 636 FOR OP/ED): Performed by: NURSE PRACTITIONER

## 2024-09-23 PROCEDURE — 36415 COLL VENOUS BLD VENIPUNCTURE: CPT | Performed by: NURSE PRACTITIONER

## 2024-09-23 PROCEDURE — 96361 HYDRATE IV INFUSION ADD-ON: CPT

## 2024-09-23 PROCEDURE — 80048 BASIC METABOLIC PNL TOTAL CA: CPT | Performed by: NURSE PRACTITIONER

## 2024-09-23 PROCEDURE — 96374 THER/PROPH/DIAG INJ IV PUSH: CPT

## 2024-09-23 PROCEDURE — 99284 EMERGENCY DEPT VISIT MOD MDM: CPT | Mod: 25

## 2024-09-23 PROCEDURE — 87210 SMEAR WET MOUNT SALINE/INK: CPT | Performed by: NURSE PRACTITIONER

## 2024-09-23 PROCEDURE — 81001 URINALYSIS AUTO W/SCOPE: CPT | Performed by: NURSE PRACTITIONER

## 2024-09-23 PROCEDURE — 85025 COMPLETE CBC W/AUTO DIFF WBC: CPT | Performed by: NURSE PRACTITIONER

## 2024-09-23 PROCEDURE — 87491 CHLMYD TRACH DNA AMP PROBE: CPT | Mod: PORLAB | Performed by: NURSE PRACTITIONER

## 2024-09-23 RX ORDER — KETOROLAC TROMETHAMINE 30 MG/ML
30 INJECTION, SOLUTION INTRAMUSCULAR; INTRAVENOUS ONCE
Status: COMPLETED | OUTPATIENT
Start: 2024-09-23 | End: 2024-09-23

## 2024-09-23 ASSESSMENT — PAIN - FUNCTIONAL ASSESSMENT
PAIN_FUNCTIONAL_ASSESSMENT: 0-10
PAIN_FUNCTIONAL_ASSESSMENT: 0-10

## 2024-09-23 ASSESSMENT — PAIN SCALES - GENERAL
PAINLEVEL_OUTOF10: 5 - MODERATE PAIN
PAINLEVEL_OUTOF10: 5 - MODERATE PAIN

## 2024-09-23 NOTE — ED PROVIDER NOTES
Chief Complaint   Patient presents with   • Abdominal Pain     Pt arrived to the ED r/t x1.5 month stent of urinary symptoms which has now transitioned into lower abd pain and lower lumbar tenderness. Pt states the symptoms initially began as painful urination. Pt has seen her urologist several times with changes in abx with no avail. Pt endorses nausea without vomiting.        HPI       44 year old female presents to the Emergency Department today complaining of a 1-2 month history of urinary frequency and urgency as well as burning with urination.Has developed lower abdominal pain with radiation to her back over the past several days. Reports to having nausea associated with the above. Denies any associated fever, chills, headache, neck pain, chest pain, shortness of breath, abdominal pain, nausea, vomiting, diarrhea, constipation, hematemesis, hematochezia, melena, or vaginal dicharge.       History provided by:  Patient             Patient History   Past Medical History:   Diagnosis Date   • Arthritis    • Depression    • Dysuria 05/08/2023   • GERD (gastroesophageal reflux disease)    • Hyperlipidemia, unspecified 04/14/2016    Dyslipidemia   • Migraines    • NAFL (nonalcoholic fatty liver)    • Neuropathic pain of chest 05/08/2023   • Pain of right breast 05/08/2023   • Personal history of other diseases of the digestive system 07/07/2020    History of fatty infiltration of liver   • Sleep apnea    • TMJ (dislocation of temporomandibular joint)    • TMJ (temporomandibular joint disorder)    • Urinary urgency 05/08/2023   • Vaginal discharge 05/08/2023     Past Surgical History:   Procedure Laterality Date   • CERVICAL BIOPSY  W/ LOOP ELECTRODE EXCISION  04/16/2015    Cervical Loop Electrosurgical Excision (LEEP)   • CHOLECYSTECTOMY  04/16/2015    Cholecystectomy Laparoscopic   • HYSTERECTOMY  09/07/2021    Hysterectomy   • OTHER SURGICAL HISTORY  07/08/2020    Exploratory laparotomy   • OTHER SURGICAL HISTORY   07/08/2020    Umbilical hernia repair   • OTHER SURGICAL HISTORY  07/08/2020    Tonsillectomy   • OTHER SURGICAL HISTORY  02/16/2021    Esophagogastroduodenoscopy   • OTHER SURGICAL HISTORY  02/16/2021    Colonoscopy   • TOTAL ABDOMINAL HYSTERECTOMY  04/16/2015    Total Abdominal Hysterectomy     Family History   Problem Relation Name Age of Onset   • Diabetes Father     • Hypertension Father     • Diabetes Father's Sister     • Stomach cancer Father's Sister     • Diabetes Paternal Grandmother     • Hypertension Paternal Grandmother     • Coronary artery disease Paternal Grandmother          MI>60s   • Diabetes Paternal Cousin       Social History     Tobacco Use   • Smoking status: Never   • Smokeless tobacco: Never   Vaping Use   • Vaping status: Never Used   Substance Use Topics   • Alcohol use: Never   • Drug use: Never           Physical Exam  Constitutional:       Appearance: Normal appearance.   HENT:      Head: Normocephalic.      Right Ear: Tympanic membrane, ear canal and external ear normal.      Left Ear: Tympanic membrane, ear canal and external ear normal.      Nose: Nose normal.      Mouth/Throat:      Mouth: Mucous membranes are moist.      Pharynx: Oropharynx is clear. No oropharyngeal exudate or posterior oropharyngeal erythema.   Eyes:      Conjunctiva/sclera: Conjunctivae normal.      Pupils: Pupils are equal, round, and reactive to light.   Cardiovascular:      Rate and Rhythm: Normal rate and regular rhythm.      Pulses:           Radial pulses are 3+ on the right side and 3+ on the left side.        Dorsalis pedis pulses are 3+ on the right side and 3+ on the left side.      Heart sounds: Normal heart sounds. No murmur heard.     No friction rub. No gallop.   Pulmonary:      Effort: Pulmonary effort is normal. No respiratory distress.      Breath sounds: Normal breath sounds. No wheezing, rhonchi or rales.   Abdominal:      General: Abdomen is flat. Bowel sounds are normal.      Palpations:  Abdomen is soft.      Tenderness: There is no abdominal tenderness. There is no right CVA tenderness, left CVA tenderness, guarding or rebound. Negative signs include Posey's sign and McBurney's sign.   Musculoskeletal:         General: No swelling or deformity.      Cervical back: Full passive range of motion without pain.      Right lower leg: No edema.      Left lower leg: No edema.   Lymphadenopathy:      Cervical: No cervical adenopathy.   Skin:     Capillary Refill: Capillary refill takes less than 2 seconds.      Coloration: Skin is not jaundiced.      Findings: No rash.   Neurological:      General: No focal deficit present.      Mental Status: She is alert and oriented to person, place, and time. Mental status is at baseline.      Gait: Gait is intact.   Psychiatric:         Mood and Affect: Mood normal.         Behavior: Behavior is cooperative.         Labs Reviewed   BASIC METABOLIC PANEL - Abnormal       Result Value    Glucose 73 (*)     Sodium 139      Potassium 4.3      Chloride 107      Bicarbonate 27      Anion Gap 9 (*)     Urea Nitrogen 14      Creatinine 0.96      eGFR 76      Calcium 9.2     URINALYSIS WITH REFLEX CULTURE AND MICROSCOPIC - Abnormal    Color, Urine Light-Yellow      Appearance, Urine Clear      Specific Gravity, Urine 1.021      pH, Urine 5.5      Protein, Urine NEGATIVE      Glucose, Urine Normal      Blood, Urine NEGATIVE      Ketones, Urine NEGATIVE      Bilirubin, Urine NEGATIVE      Urobilinogen, Urine Normal      Nitrite, Urine NEGATIVE      Leukocyte Esterase, Urine 75 Marian/µL (*)    URINE CULTURE   CBC WITH AUTO DIFFERENTIAL    WBC 6.9      nRBC 0.0      RBC 4.96      Hemoglobin 14.8      Hematocrit 44.3      MCV 89      MCH 29.8      MCHC 33.4      RDW 12.2      Platelets 153      Neutrophils % 60.7      Immature Granulocytes %, Automated 0.1      Lymphocytes % 31.3      Monocytes % 6.0      Eosinophils % 1.3      Basophils % 0.6      Neutrophils Absolute 4.18       Immature Granulocytes Absolute, Automated 0.01      Lymphocytes Absolute 2.15      Monocytes Absolute 0.41      Eosinophils Absolute 0.09      Basophils Absolute 0.04     TRICHOMONAS WET PREP REFLEX TO PCR    Trichomonas None Seen      Clue Cells None Seen      Yeast None Seen      WBC 1-2      Trichomonas reflex comment        Value: Trichomonas was not seen by wet prep. Reflex Trichomonas vaginalis by Amplified Detection.   MICROSCOPIC ONLY, URINE    WBC, Urine 1-5      RBC, Urine NONE      Squamous Epithelial Cells, Urine 1-9 (SPARSE)      Mucus, Urine FEW     URINALYSIS WITH REFLEX CULTURE AND MICROSCOPIC    Narrative:     The following orders were created for panel order Urinalysis with Reflex Culture and Microscopic.  Procedure                               Abnormality         Status                     ---------                               -----------         ------                     Urinalysis with Reflex C...[[id number]]  Abnormal            Final result               Extra Urine Gray Tube[[id number]]                            In process                   Please view results for these tests on the individual orders.   C. TRACHOMATIS + N. GONORRHOEAE, AMPLIFIED   EXTRA URINE GRAY TUBE   TRICH VAGINALIS, AMPLIFIED       CT abdomen pelvis wo IV contrast   Final Result   Nonobstructive left-sided nephrolithiasis. No hydronephrosis or signs   of obstructive uropathy.        MACRO:   None.        Signed by: Graeme Garcia 9/23/2024 8:56 AM   Dictation workstation:   [id number]               ED Course & MDM   Diagnoses as of 09/23/24 1104   Lower abdominal pain           Medical Decision Making  Patient was seen and evaluated by myself. Saline lock was established with labs drawn and results as above. Given 1 Liter of normal saline wide open over 1 hour. Given Toradol as well. After receiving the medication and IV fluids, reported feeling improved. Blood counts, electrolytes, and kidney function were  unremarkable. Urinalysis shows leukocytes, but no nitrites,bacteria, and limited WBC's. Urine was sent for C & S which is pending. With the fact that she has completed 3 courses of antibiotics and her last urine culture showed no growth, I will hold off on treatment unless culture dictates need. Wet prep was negative. GC and Chlamydia cultures were obtained and pending. CT scan of her abdomen and pelvis without contrast shows nonobstructive left-sided nephrolithiasis without hydronephrosis or signs of obstructive uropathy. Repeat abdominal evaluation reveals an abdomen soft, nondistended, and nontender to palpation. There was no rebound, rigidity, or guarding noted. There were no peritoneal signs noted. Continued to have multiple benign serial abdominal exams. At this time, we find no underlying evidence of acute pancreatitis, cholecystitis, cholangitis, diverticulitis, or appendicitis. I am unclear as to the direct etiology of her symptoms. She already has a prescription for Pyridium. Follow up with their doctor in 3 days. Return if worse in any way. Discharged in stable condition with computer instructions.    Diagnostic Impression:     1. Acute lower abdominal pain    2. IV meds and fluids in ED              Your medication list        ASK your doctor about these medications        Instructions Last Dose Given Next Dose Due   acetaminophen 500 mg tablet  Commonly known as: Tylenol      Take 2 tablets (1,000 mg) by mouth every 6 hours if needed for mild pain (1 - 3).       atorvastatin 10 mg tablet  Commonly known as: Lipitor      Take 1 tablet (10 mg) by mouth once daily.       cholecalciferol 50 MCG (2000 UT) tablet  Commonly known as: Vitamin D-3           famotidine 40 mg tablet  Commonly known as: Pepcid           ibuprofen 600 mg tablet           meloxicam 15 mg tablet  Commonly known as: Mobic      Take 1 tablet (15 mg) by mouth once daily.       methylPREDNISolone 4 mg tablets  Commonly known as: Medrol  Norberto      Follow schedule on package instructions       omeprazole 40 mg DR capsule  Commonly known as: PriLOSEC           Osphena 60 mg tablet  Generic drug: ospemifene      Take 60 mg by mouth once daily.       phenazopyridine 200 mg tablet  Commonly known as: Pyridium      TAKE 1 TABLET BY MOUTH 3 TIMES A DAY       PROBIOTIC ACIDOPHILUS ORAL           QUEtiapine 25 mg tablet  Commonly known as: SEROquel      Take 1 tablet (25 mg) by mouth once daily at bedtime.       tiZANidine 4 mg tablet  Commonly known as: Zanaflex      Take 1 tablet (4 mg) by mouth every 6 hours if needed for muscle spasms.       VITAMIN B COMPLEX ORAL           Wegovy 0.25 mg/0.5 mL pen injector  Generic drug: semaglutide (weight loss)      Inject 0.25 mg under the skin.                  Procedure  Procedures     Rustam Allen, MILAGRO-CNP  09/23/24 1109

## 2024-09-24 ENCOUNTER — PREP FOR PROCEDURE (OUTPATIENT)
Dept: UROLOGY | Facility: CLINIC | Age: 44
End: 2024-09-24
Payer: COMMERCIAL

## 2024-09-24 DIAGNOSIS — N39.3 SUI (STRESS URINARY INCONTINENCE, FEMALE): Primary | ICD-10-CM

## 2024-09-24 LAB
BACTERIA UR CULT: NORMAL
C TRACH RRNA SPEC QL NAA+PROBE: NEGATIVE
N GONORRHOEA DNA SPEC QL PROBE+SIG AMP: NEGATIVE

## 2024-09-24 RX ORDER — ACETAMINOPHEN 325 MG/1
975 TABLET ORAL ONCE
OUTPATIENT
Start: 2024-09-24 | End: 2024-09-24

## 2024-09-24 RX ORDER — CELECOXIB 400 MG/1
400 CAPSULE ORAL ONCE
OUTPATIENT
Start: 2024-09-24 | End: 2024-09-24

## 2024-09-24 RX ORDER — CEFAZOLIN SODIUM 2 G/100ML
2 INJECTION, SOLUTION INTRAVENOUS ONCE
OUTPATIENT
Start: 2024-09-24 | End: 2024-09-24

## 2024-09-25 ENCOUNTER — APPOINTMENT (OUTPATIENT)
Dept: PRIMARY CARE | Facility: CLINIC | Age: 44
End: 2024-09-25
Payer: COMMERCIAL

## 2024-09-25 VITALS
OXYGEN SATURATION: 99 % | HEART RATE: 82 BPM | SYSTOLIC BLOOD PRESSURE: 122 MMHG | TEMPERATURE: 97.6 F | DIASTOLIC BLOOD PRESSURE: 80 MMHG | HEIGHT: 66 IN | WEIGHT: 144 LBS | BODY MASS INDEX: 23.14 KG/M2

## 2024-09-25 DIAGNOSIS — N20.0 KIDNEY STONES: ICD-10-CM

## 2024-09-25 DIAGNOSIS — Z00.00 ROUTINE GENERAL MEDICAL EXAMINATION AT A HEALTH CARE FACILITY: Primary | ICD-10-CM

## 2024-09-25 DIAGNOSIS — K76.0 NONALCOHOLIC FATTY LIVER DISEASE: ICD-10-CM

## 2024-09-25 DIAGNOSIS — E78.2 MIXED HYPERLIPIDEMIA: ICD-10-CM

## 2024-09-25 DIAGNOSIS — R10.2 PELVIC PAIN: ICD-10-CM

## 2024-09-25 PROCEDURE — 99396 PREV VISIT EST AGE 40-64: CPT | Performed by: FAMILY MEDICINE

## 2024-09-25 PROCEDURE — 99213 OFFICE O/P EST LOW 20 MIN: CPT | Performed by: FAMILY MEDICINE

## 2024-09-25 PROCEDURE — 3008F BODY MASS INDEX DOCD: CPT | Performed by: FAMILY MEDICINE

## 2024-09-25 PROCEDURE — 1036F TOBACCO NON-USER: CPT | Performed by: FAMILY MEDICINE

## 2024-09-25 ASSESSMENT — ENCOUNTER SYMPTOMS
NAUSEA: 0
ABDOMINAL PAIN: 0
DIARRHEA: 0
DIZZINESS: 0
DYSPHORIC MOOD: 0
DYSURIA: 0
MYALGIAS: 0
POLYDIPSIA: 0
ARTHRALGIAS: 0
SHORTNESS OF BREATH: 0
HEADACHES: 0
BLOOD IN STOOL: 0
DIFFICULTY URINATING: 0
CONSTIPATION: 0
PALPITATIONS: 0
ABDOMINAL DISTENTION: 0
POLYPHAGIA: 0
SLEEP DISTURBANCE: 0
FATIGUE: 0
VOMITING: 0

## 2024-09-25 ASSESSMENT — PATIENT HEALTH QUESTIONNAIRE - PHQ9
2. FEELING DOWN, DEPRESSED OR HOPELESS: NOT AT ALL
1. LITTLE INTEREST OR PLEASURE IN DOING THINGS: NOT AT ALL
1. LITTLE INTEREST OR PLEASURE IN DOING THINGS: NOT AT ALL
SUM OF ALL RESPONSES TO PHQ9 QUESTIONS 1 AND 2: 0
SUM OF ALL RESPONSES TO PHQ9 QUESTIONS 1 AND 2: 0
2. FEELING DOWN, DEPRESSED OR HOPELESS: NOT AT ALL

## 2024-09-25 NOTE — PROGRESS NOTES
"Subjective   Patient ID: Ronda Dean is a 44 y.o. female who presents for Annual Exam and multiple issues    HPI   Lipids: has been high but improving. Last (147). Never started statin.   Pelvic pain: recently in ER for pelvic pain. Urology following. ?related to sling. Has upcoming apt for losening of sling per pt. Ucx have been negative.   BMI: now nml. Take ozempic from different provider    Never started seroquel for sleep. Sleeping better    Review of Systems   Constitutional:  Negative for fatigue.   HENT: Negative.     Eyes:  Negative for visual disturbance.   Respiratory:  Negative for shortness of breath.    Cardiovascular:  Negative for chest pain and palpitations.   Gastrointestinal:  Negative for abdominal distention, abdominal pain, blood in stool, constipation, diarrhea, nausea and vomiting.   Endocrine: Negative for cold intolerance, heat intolerance, polydipsia, polyphagia and polyuria.   Genitourinary:  Negative for difficulty urinating and dysuria.   Musculoskeletal:  Negative for arthralgias and myalgias.   Skin:  Negative for rash.   Neurological:  Negative for dizziness and headaches.   Psychiatric/Behavioral:  Negative for dysphoric mood and sleep disturbance.        Objective   /80   Pulse 82   Temp 36.4 °C (97.6 °F)   Ht 1.664 m (5' 5.5\")   Wt 65.3 kg (144 lb)   SpO2 99%   BMI 23.60 kg/m²     Physical Exam  Vitals and nursing note reviewed.   Constitutional:       General: She is not in acute distress.     Appearance: Normal appearance. She is not toxic-appearing.   HENT:      Head: Normocephalic.      Right Ear: Tympanic membrane normal.      Left Ear: Tympanic membrane normal.      Nose: Nose normal.      Mouth/Throat:      Pharynx: Oropharynx is clear.   Eyes:      General: No scleral icterus.     Pupils: Pupils are equal, round, and reactive to light.   Cardiovascular:      Rate and Rhythm: Normal rate and regular rhythm.      Pulses: Normal pulses.      Heart " sounds: No murmur heard.  Pulmonary:      Effort: Pulmonary effort is normal. No respiratory distress.      Breath sounds: Normal breath sounds.   Abdominal:      Palpations: Abdomen is soft.      Tenderness: There is abdominal tenderness. There is left CVA tenderness. There is no right CVA tenderness or rebound.   Musculoskeletal:         General: No tenderness.      Cervical back: Neck supple.      Right lower leg: No edema.      Left lower leg: No edema.   Lymphadenopathy:      Cervical: No cervical adenopathy.   Skin:     General: Skin is warm.   Neurological:      General: No focal deficit present.      Mental Status: She is alert.      Cranial Nerves: No cranial nerve deficit.   Psychiatric:         Mood and Affect: Mood normal.         Assessment/Plan   Problem List Items Addressed This Visit             ICD-10-CM    Mixed hyperlipidemia E78.2    Relevant Orders    Comprehensive Metabolic Panel    Lipid Panel    Nonalcoholic fatty liver disease K76.0     Other Visit Diagnoses         Codes    Routine general medical examination at a health care facility    -  Primary Z00.00    Kidney stones     N20.0    Pelvic pain     R10.2          Discussed blood work and wellness issues. Reviewed screenings and immunizations. Recommendations given. Defers Hep a and B vaccines until after surgery    Reviewed ER reports.

## 2024-09-25 NOTE — PATIENT INSTRUCTIONS
Recommend a predominant low fat whole foods plant based diet.  Cut back on meat, dairy, processed carbs, salt and oils(especially palm and coconut). Increase fiber in your diet.  Decrease alcohol as much as possible if you drink. Recommend regular exercise most days of the week(goal up to 150min per week). Also recommend good sleep habits aiming for 7-8 hours per night.     Follow up with your specialists as scheduled    Recommend hep A and B vaccines    Recommend pelvic PT when you are healed from surgery    Return in 3 months, sooner if needed

## 2024-09-27 ENCOUNTER — TELEPHONE (OUTPATIENT)
Dept: PREADMISSION TESTING | Facility: HOSPITAL | Age: 44
End: 2024-09-27
Payer: COMMERCIAL

## 2024-10-01 ENCOUNTER — APPOINTMENT (OUTPATIENT)
Dept: UROLOGY | Facility: CLINIC | Age: 44
End: 2024-10-01
Payer: COMMERCIAL

## 2024-10-02 ENCOUNTER — APPOINTMENT (OUTPATIENT)
Dept: OBSTETRICS AND GYNECOLOGY | Facility: CLINIC | Age: 44
End: 2024-10-02
Payer: COMMERCIAL

## 2024-10-07 ENCOUNTER — ANESTHESIA EVENT (OUTPATIENT)
Dept: OPERATING ROOM | Facility: HOSPITAL | Age: 44
End: 2024-10-07
Payer: COMMERCIAL

## 2024-10-11 ENCOUNTER — HOSPITAL ENCOUNTER (OUTPATIENT)
Facility: HOSPITAL | Age: 44
Setting detail: OUTPATIENT SURGERY
Discharge: HOME | End: 2024-10-11
Attending: STUDENT IN AN ORGANIZED HEALTH CARE EDUCATION/TRAINING PROGRAM | Admitting: STUDENT IN AN ORGANIZED HEALTH CARE EDUCATION/TRAINING PROGRAM
Payer: COMMERCIAL

## 2024-10-11 ENCOUNTER — PHARMACY VISIT (OUTPATIENT)
Dept: PHARMACY | Facility: CLINIC | Age: 44
End: 2024-10-11
Payer: MEDICARE

## 2024-10-11 ENCOUNTER — ANESTHESIA (OUTPATIENT)
Dept: OPERATING ROOM | Facility: HOSPITAL | Age: 44
End: 2024-10-11
Payer: COMMERCIAL

## 2024-10-11 VITALS
RESPIRATION RATE: 16 BRPM | DIASTOLIC BLOOD PRESSURE: 63 MMHG | OXYGEN SATURATION: 100 % | HEART RATE: 73 BPM | TEMPERATURE: 97.9 F | SYSTOLIC BLOOD PRESSURE: 107 MMHG | HEIGHT: 65 IN | BODY MASS INDEX: 23.99 KG/M2 | WEIGHT: 144 LBS

## 2024-10-11 DIAGNOSIS — N39.3 SUI (STRESS URINARY INCONTINENCE, FEMALE): ICD-10-CM

## 2024-10-11 DIAGNOSIS — R10.2 PELVIC PAIN IN FEMALE: Primary | ICD-10-CM

## 2024-10-11 PROCEDURE — 7100000001 HC RECOVERY ROOM TIME - INITIAL BASE CHARGE: Performed by: STUDENT IN AN ORGANIZED HEALTH CARE EDUCATION/TRAINING PROGRAM

## 2024-10-11 PROCEDURE — 2500000004 HC RX 250 GENERAL PHARMACY W/ HCPCS (ALT 636 FOR OP/ED): Performed by: ANESTHESIOLOGY

## 2024-10-11 PROCEDURE — 2500000004 HC RX 250 GENERAL PHARMACY W/ HCPCS (ALT 636 FOR OP/ED): Mod: JZ | Performed by: STUDENT IN AN ORGANIZED HEALTH CARE EDUCATION/TRAINING PROGRAM

## 2024-10-11 PROCEDURE — RXMED WILLOW AMBULATORY MEDICATION CHARGE

## 2024-10-11 PROCEDURE — 7100000010 HC PHASE TWO TIME - EACH INCREMENTAL 1 MINUTE: Performed by: STUDENT IN AN ORGANIZED HEALTH CARE EDUCATION/TRAINING PROGRAM

## 2024-10-11 PROCEDURE — 3600000003 HC OR TIME - INITIAL BASE CHARGE - PROCEDURE LEVEL THREE: Performed by: STUDENT IN AN ORGANIZED HEALTH CARE EDUCATION/TRAINING PROGRAM

## 2024-10-11 PROCEDURE — 7100000002 HC RECOVERY ROOM TIME - EACH INCREMENTAL 1 MINUTE: Performed by: STUDENT IN AN ORGANIZED HEALTH CARE EDUCATION/TRAINING PROGRAM

## 2024-10-11 PROCEDURE — 2500000004 HC RX 250 GENERAL PHARMACY W/ HCPCS (ALT 636 FOR OP/ED): Performed by: NURSE ANESTHETIST, CERTIFIED REGISTERED

## 2024-10-11 PROCEDURE — 3600000008 HC OR TIME - EACH INCREMENTAL 1 MINUTE - PROCEDURE LEVEL THREE: Performed by: STUDENT IN AN ORGANIZED HEALTH CARE EDUCATION/TRAINING PROGRAM

## 2024-10-11 PROCEDURE — 3700000002 HC GENERAL ANESTHESIA TIME - EACH INCREMENTAL 1 MINUTE: Performed by: STUDENT IN AN ORGANIZED HEALTH CARE EDUCATION/TRAINING PROGRAM

## 2024-10-11 PROCEDURE — 2500000001 HC RX 250 WO HCPCS SELF ADMINISTERED DRUGS (ALT 637 FOR MEDICARE OP): Performed by: STUDENT IN AN ORGANIZED HEALTH CARE EDUCATION/TRAINING PROGRAM

## 2024-10-11 PROCEDURE — 2500000004 HC RX 250 GENERAL PHARMACY W/ HCPCS (ALT 636 FOR OP/ED): Performed by: STUDENT IN AN ORGANIZED HEALTH CARE EDUCATION/TRAINING PROGRAM

## 2024-10-11 PROCEDURE — 7100000009 HC PHASE TWO TIME - INITIAL BASE CHARGE: Performed by: STUDENT IN AN ORGANIZED HEALTH CARE EDUCATION/TRAINING PROGRAM

## 2024-10-11 PROCEDURE — 57288 REPAIR BLADDER DEFECT: CPT | Performed by: STUDENT IN AN ORGANIZED HEALTH CARE EDUCATION/TRAINING PROGRAM

## 2024-10-11 PROCEDURE — 3700000001 HC GENERAL ANESTHESIA TIME - INITIAL BASE CHARGE: Performed by: STUDENT IN AN ORGANIZED HEALTH CARE EDUCATION/TRAINING PROGRAM

## 2024-10-11 PROCEDURE — 2720000007 HC OR 272 NO HCPCS: Performed by: STUDENT IN AN ORGANIZED HEALTH CARE EDUCATION/TRAINING PROGRAM

## 2024-10-11 RX ORDER — PHENYLEPHRINE HCL IN 0.9% NACL 0.4MG/10ML
SYRINGE (ML) INTRAVENOUS AS NEEDED
Status: DISCONTINUED | OUTPATIENT
Start: 2024-10-11 | End: 2024-10-11

## 2024-10-11 RX ORDER — LIDOCAINE HYDROCHLORIDE AND EPINEPHRINE 10; 10 MG/ML; UG/ML
INJECTION, SOLUTION INFILTRATION; PERINEURAL AS NEEDED
Status: DISCONTINUED | OUTPATIENT
Start: 2024-10-11 | End: 2024-10-11 | Stop reason: HOSPADM

## 2024-10-11 RX ORDER — ALBUTEROL SULFATE 0.83 MG/ML
2.5 SOLUTION RESPIRATORY (INHALATION) ONCE AS NEEDED
Status: DISCONTINUED | OUTPATIENT
Start: 2024-10-11 | End: 2024-10-11 | Stop reason: HOSPADM

## 2024-10-11 RX ORDER — LIDOCAINE HYDROCHLORIDE 10 MG/ML
0.1 INJECTION, SOLUTION EPIDURAL; INFILTRATION; INTRACAUDAL; PERINEURAL ONCE
Status: DISCONTINUED | OUTPATIENT
Start: 2024-10-11 | End: 2024-10-11 | Stop reason: HOSPADM

## 2024-10-11 RX ORDER — PROPOFOL 10 MG/ML
INJECTION, EMULSION INTRAVENOUS AS NEEDED
Status: DISCONTINUED | OUTPATIENT
Start: 2024-10-11 | End: 2024-10-11

## 2024-10-11 RX ORDER — KETOROLAC TROMETHAMINE 10 MG/1
10 TABLET, FILM COATED ORAL EVERY 6 HOURS PRN
Qty: 20 TABLET | Refills: 0 | Status: SHIPPED | OUTPATIENT
Start: 2024-10-11

## 2024-10-11 RX ORDER — SCOLOPAMINE TRANSDERMAL SYSTEM 1 MG/1
1 PATCH, EXTENDED RELEASE TRANSDERMAL ONCE
Status: DISCONTINUED | OUTPATIENT
Start: 2024-10-11 | End: 2024-10-11 | Stop reason: HOSPADM

## 2024-10-11 RX ORDER — FENTANYL CITRATE 50 UG/ML
INJECTION, SOLUTION INTRAMUSCULAR; INTRAVENOUS AS NEEDED
Status: DISCONTINUED | OUTPATIENT
Start: 2024-10-11 | End: 2024-10-11

## 2024-10-11 RX ORDER — LABETALOL HYDROCHLORIDE 5 MG/ML
5 INJECTION, SOLUTION INTRAVENOUS ONCE AS NEEDED
Status: DISCONTINUED | OUTPATIENT
Start: 2024-10-11 | End: 2024-10-11 | Stop reason: HOSPADM

## 2024-10-11 RX ORDER — HYDRALAZINE HYDROCHLORIDE 20 MG/ML
5 INJECTION INTRAMUSCULAR; INTRAVENOUS EVERY 30 MIN PRN
Status: DISCONTINUED | OUTPATIENT
Start: 2024-10-11 | End: 2024-10-11 | Stop reason: HOSPADM

## 2024-10-11 RX ORDER — OXYCODONE AND ACETAMINOPHEN 5; 325 MG/1; MG/1
1 TABLET ORAL EVERY 4 HOURS PRN
Status: DISCONTINUED | OUTPATIENT
Start: 2024-10-11 | End: 2024-10-11 | Stop reason: HOSPADM

## 2024-10-11 RX ORDER — MORPHINE SULFATE 2 MG/ML
2 INJECTION, SOLUTION INTRAMUSCULAR; INTRAVENOUS EVERY 5 MIN PRN
Status: DISCONTINUED | OUTPATIENT
Start: 2024-10-11 | End: 2024-10-11 | Stop reason: HOSPADM

## 2024-10-11 RX ORDER — CELECOXIB 200 MG/1
400 CAPSULE ORAL ONCE
Status: COMPLETED | OUTPATIENT
Start: 2024-10-11 | End: 2024-10-11

## 2024-10-11 RX ORDER — DROPERIDOL 2.5 MG/ML
0.62 INJECTION, SOLUTION INTRAMUSCULAR; INTRAVENOUS ONCE AS NEEDED
Status: DISCONTINUED | OUTPATIENT
Start: 2024-10-11 | End: 2024-10-11 | Stop reason: HOSPADM

## 2024-10-11 RX ORDER — MEPERIDINE HYDROCHLORIDE 25 MG/ML
12.5 INJECTION INTRAMUSCULAR; INTRAVENOUS; SUBCUTANEOUS EVERY 10 MIN PRN
Status: DISCONTINUED | OUTPATIENT
Start: 2024-10-11 | End: 2024-10-11 | Stop reason: HOSPADM

## 2024-10-11 RX ORDER — CEFAZOLIN SODIUM 2 G/100ML
2 INJECTION, SOLUTION INTRAVENOUS ONCE
Status: COMPLETED | OUTPATIENT
Start: 2024-10-11 | End: 2024-10-11

## 2024-10-11 RX ORDER — SODIUM CHLORIDE, SODIUM LACTATE, POTASSIUM CHLORIDE, CALCIUM CHLORIDE 600; 310; 30; 20 MG/100ML; MG/100ML; MG/100ML; MG/100ML
100 INJECTION, SOLUTION INTRAVENOUS CONTINUOUS
Status: DISCONTINUED | OUTPATIENT
Start: 2024-10-11 | End: 2024-10-11 | Stop reason: HOSPADM

## 2024-10-11 RX ORDER — ACETAMINOPHEN 325 MG/1
975 TABLET ORAL ONCE
Status: COMPLETED | OUTPATIENT
Start: 2024-10-11 | End: 2024-10-11

## 2024-10-11 RX ORDER — TRAMADOL HYDROCHLORIDE 50 MG/1
50 TABLET ORAL EVERY 6 HOURS PRN
Qty: 15 TABLET | Refills: 0 | Status: SHIPPED | OUTPATIENT
Start: 2024-10-11

## 2024-10-11 RX ORDER — POLYETHYLENE GLYCOL 3350 17 G/17G
17 POWDER, FOR SOLUTION ORAL DAILY
Qty: 510 G | Refills: 3 | Status: SHIPPED | OUTPATIENT
Start: 2024-10-11

## 2024-10-11 RX ORDER — FAMOTIDINE 10 MG/ML
20 INJECTION INTRAVENOUS ONCE
Status: COMPLETED | OUTPATIENT
Start: 2024-10-11 | End: 2024-10-11

## 2024-10-11 RX ORDER — ADHESIVE BANDAGE
30 BANDAGE TOPICAL DAILY PRN
Qty: 355 ML | Refills: 0 | Status: SHIPPED | OUTPATIENT
Start: 2024-10-11

## 2024-10-11 RX ORDER — DOCUSATE SODIUM 100 MG/1
200 CAPSULE, LIQUID FILLED ORAL 2 TIMES DAILY
Qty: 30 CAPSULE | Refills: 3 | Status: SHIPPED | OUTPATIENT
Start: 2024-10-11

## 2024-10-11 RX ORDER — MIDAZOLAM HYDROCHLORIDE 1 MG/ML
INJECTION, SOLUTION INTRAMUSCULAR; INTRAVENOUS AS NEEDED
Status: DISCONTINUED | OUTPATIENT
Start: 2024-10-11 | End: 2024-10-11

## 2024-10-11 RX ORDER — LIDOCAINE HYDROCHLORIDE 20 MG/ML
INJECTION, SOLUTION INFILTRATION; PERINEURAL AS NEEDED
Status: DISCONTINUED | OUTPATIENT
Start: 2024-10-11 | End: 2024-10-11

## 2024-10-11 RX ORDER — METOCLOPRAMIDE HYDROCHLORIDE 5 MG/ML
INJECTION INTRAMUSCULAR; INTRAVENOUS AS NEEDED
Status: DISCONTINUED | OUTPATIENT
Start: 2024-10-11 | End: 2024-10-11

## 2024-10-11 RX ORDER — SODIUM CHLORIDE 9 MG/ML
INJECTION, SOLUTION INTRAVENOUS CONTINUOUS PRN
Status: DISCONTINUED | OUTPATIENT
Start: 2024-10-11 | End: 2024-10-11

## 2024-10-11 RX ORDER — DIPHENHYDRAMINE HYDROCHLORIDE 50 MG/ML
12.5 INJECTION INTRAMUSCULAR; INTRAVENOUS ONCE AS NEEDED
Status: DISCONTINUED | OUTPATIENT
Start: 2024-10-11 | End: 2024-10-11 | Stop reason: HOSPADM

## 2024-10-11 SDOH — HEALTH STABILITY: MENTAL HEALTH: CURRENT SMOKER: 0

## 2024-10-11 ASSESSMENT — PAIN SCALES - GENERAL
PAINLEVEL_OUTOF10: 0 - NO PAIN
PAIN_LEVEL: 0
PAINLEVEL_OUTOF10: 0 - NO PAIN
PAINLEVEL_OUTOF10: 0 - NO PAIN

## 2024-10-11 NOTE — DISCHARGE INSTRUCTIONS
Call Dr. Phillips for any problems and/or concerns     *Walk as much as possible, it is ok to use stairs carefully  *Ok to shower, avoid soaking in tub baths or swimming for 6 weeks after surgery   *Continue the coughing and deep breathing exercises that your learned in the hospital  *No lifting/straining and avoid constipation for 6 weeks (Avoid strenuous activity)  *Prevent constipation by using stool softeners and staying hydrated, so that you do not strain against your stitches or have pain from constipation  *Vaginal rest for 6 weeks (Do not put anything in your vagina except prescribed vaginal estrogen. Do not use tampons or douches. Do not have sex until cleared by physician)     Call Doctor right away for:     *Fever above 100.4/shaking chills  *Bright red vaginal bleeding or bleeding that soaks more than one sanitary pad per hour  *A foul smelling discharge from the vagina  *Trouble urinating or burning with urination  *Severe pain or bloating in your abdomen  *Persistent nausea/vomiting  *Redness, swelling, or drainage at your incision sites  *Chest pain/shortness of breath-call 911.     - You will be going home with prescriptions for pain medication. If you are able to take them, alternate a dose of Acetaminophen (Tylenol) and Ketorolac (Toradol) every 3 hours. (For example, 1000mg of Tylenol at 09:00am, 10mg Toradol at 12:00pm, 1000mg of Tylenol at 3:00pm, 10mg Toradol at 6:00pm).   - Use any prescribed narcotic (ie Tramadol) for breakthrough pain as prescribed. Use a stool softener, such as Miralax, to prevent constipation from the narcotic medication.

## 2024-10-11 NOTE — H&P
History Of Present Illness  Ronda Dean is a 44 y.o. female presenting with pelvic pain and difficulty emptying bladder.    Underwent mid-urethral sling using synthetic mesh 12/22/2023.     Past Medical History  Past Medical History:   Diagnosis Date    Arthritis     Depression     Dysuria 05/08/2023    GERD (gastroesophageal reflux disease)     Hyperlipidemia, unspecified 04/14/2016    Dyslipidemia    Kidney stones     Migraines     NAFL (nonalcoholic fatty liver)     Neuropathic pain of chest 05/08/2023    Pain of right breast 05/08/2023    Personal history of other diseases of the digestive system 07/07/2020    History of fatty infiltration of liver    PONV (postoperative nausea and vomiting)     Sleep apnea     TMJ (dislocation of temporomandibular joint)     TMJ (temporomandibular joint disorder)     Urinary urgency 05/08/2023    Vaginal discharge 05/08/2023       Surgical History  Past Surgical History:   Procedure Laterality Date    CERVICAL BIOPSY  W/ LOOP ELECTRODE EXCISION  04/16/2015    Cervical Loop Electrosurgical Excision (LEEP)    CHOLECYSTECTOMY  04/16/2015    Cholecystectomy Laparoscopic    HYSTERECTOMY  09/07/2021    Hysterectomy    OTHER SURGICAL HISTORY  07/08/2020    Exploratory laparotomy    OTHER SURGICAL HISTORY  07/08/2020    Umbilical hernia repair    OTHER SURGICAL HISTORY  07/08/2020    Tonsillectomy    OTHER SURGICAL HISTORY  02/16/2021    Esophagogastroduodenoscopy    OTHER SURGICAL HISTORY  02/16/2021    Colonoscopy    TOTAL ABDOMINAL HYSTERECTOMY  04/16/2015    Total Abdominal Hysterectomy        Social History  She reports that she has never smoked. She has never used smokeless tobacco. She reports that she does not drink alcohol and does not use drugs.    Family History  Family History   Problem Relation Name Age of Onset    Diabetes Father      Hypertension Father      Diabetes Father's Sister      Stomach cancer Father's Sister      Diabetes Paternal Grandmother       Hypertension Paternal Grandmother      Coronary artery disease Paternal Grandmother          MI>60s    Diabetes Paternal Cousin          Allergies  Pregabalin, Bupropion, Gabapentin, Hydrochlorothiazide, Naltrexone-bupropion, Ondansetron hcl, Promethazine, Trazodone, and Penicillins    Review of Systems   All other systems reviewed and are negative.       Physical Exam  Constitutional:       General: She is not in acute distress.  Cardiovascular:      Comments: Warm and well perfused  Pulmonary:      Effort: Pulmonary effort is normal.   Abdominal:      General: There is no distension.      Palpations: Abdomen is soft.   Musculoskeletal:         General: Normal range of motion.   Skin:     General: Skin is warm and dry.   Neurological:      General: No focal deficit present.   Psychiatric:         Mood and Affect: Mood normal.          Last Recorded Vitals  There were no vitals taken for this visit.       Assessment/Plan   Assessment & Plan  DEBRA (stress urinary incontinence, female)    Pelvic pain in female      Plan for exam under anesthesia and lysis of mid-urethral sling.       I spent 10 minutes in the professional and overall care of this patient.      Frances Gardner MD

## 2024-10-11 NOTE — OP NOTE
slinglysis Operative Note     Date: 10/11/2024  OR Location: [facility]    Name: Ronda Dean, : 1980, Age: 44 y.o., MRN: [id number], Sex: female    Diagnosis  Pre-op Diagnosis      * DEBRA (stress urinary incontinence, female) [N39.3] Post-op Diagnosis     * DEBRA (stress urinary incontinence, female) [N39.3]     Procedures  Sling lysis   Surgeons      * Johan Phillips - Primary    Resident/Fellow/Other Assistant:  Surgeons and Role:  * No surgeons found with a matching role *    Procedure Summary  Anesthesia: General  ASA: II  Anesthesia Staff: CRNA: MILAGRO Kim-CRNA  Estimated Blood Loss: 5 mL  Intra-op Medications:   Administrations occurring from 1203 to 1303 on 10/11/24:   Medication Name Total Dose   ceFAZolin (Ancef) 2 g in dextrose (iso)  mL 2 g              Anesthesia Record               Intraprocedure I/O Totals          Intake    NaCl 0.9 % infusion 250.00 mL    ceFAZolin (Ancef) 2 g in dextrose (iso)  mL 100.00 mL    Total Intake 350 mL       Output    Est. Blood Loss 5 mL    Total Output 5 mL       Net    Net Volume 345 mL          Specimen: No specimens collected     Staff:   Circulator: Lois  Scrub Person: Jeanette         Drains and/or Catheters: * None in log *    Tourniquet Times:         Implants:     Findings: sling intact, in correct position    Indications: Ronda Dean is an 44 y.o. female who is having surgery for DEBRA (stress urinary incontinence, female) [N39.3].     The patient was seen in the preoperative area. The risks, benefits, complications, treatment options, non-operative alternatives, expected recovery and outcomes were discussed with the patient. The possibilities of reaction to medication, pulmonary aspiration, injury to surrounding structures, bleeding, recurrent infection, the need for additional procedures, failure to diagnose a condition, and creating a complication requiring transfusion or operation were discussed with the patient. The patient concurred  with the proposed plan, giving informed consent.  The site of surgery was properly noted/marked if necessary per policy. The patient has been actively warmed in preoperative area. Preoperative antibiotics have been ordered and given within 1 hours of incision. Venous thrombosis prophylaxis have been ordered including bilateral sequential compression devices    Procedure Details: Procedure: the patient was prepped and draped in the usual sterile fashion. The  cystoscope was inserted through the urethra into the bladder, a complete survey of the bladder and urethra revealed no abnormalities specifically  no note made of mesh erosion within the bladder or ventral urethral surface.  We then proceeded with the sling excision the vaginal epithelium was grasped with 2 Allys clamps along the midline 1 cm proximal to the urethral meatus.  the area was injected with 1% lidocaine with epinephrine and then an incision was made with the scalpel the periurethral tissue was dissected off of the vaginal epithelium Metzenbaum scissors  to the level of the endopelvic fascia at the retropubic tunnels. once this was completed the sling was identified and was dissected off of the urethra and grasped with a right angle clamp and then cut with Mcallister scissors. The vaginal epithelium was then repaired using a running 2-0 Vicryl stitch. the patient was then taken out of dorsal lithotomy, anesthesia was reversed and she was taken recovery in stable condition.    Complications:  None; patient tolerated the procedure well.    Disposition: PACU - hemodynamically stable.  Condition: stable         Additional Details:     Attending Attestation: I was present and scrubbed for the entire procedure.    Johan Phillips  Phone Number: [phone]

## 2024-10-11 NOTE — ANESTHESIA PROCEDURE NOTES
Airway  Date/Time: 10/11/2024 12:39 PM  Urgency: elective    Airway not difficult    Staffing  Performed: ANGELINA   Authorized by: LANDY Kim    Performed by: LANDY Kim  Patient location during procedure: OR    Indications and Patient Condition  Indications for airway management: anesthesia and airway protection  Spontaneous Ventilation: absent  Sedation level: deep  Preoxygenated: yes  Patient position: sniffing  Mask difficulty assessment: 1 - vent by mask    Final Airway Details  Final airway type: supraglottic airway      Successful airway: Supraglottic airway: igel.  Size 4     Number of attempts at approach: 1

## 2024-10-11 NOTE — ANESTHESIA PREPROCEDURE EVALUATION
Patient: Ronda Dean    Procedure Information       Date/Time: 10/11/24 1203    Procedure: slinglysis - ~30 min for procedure  sling lysis    Location: [facility] / [facility]    Surgeons: Johan Phillips MD            Relevant Problems   Anesthesia (within normal limits)      Cardiac   (+) Hyperlipidemia   (+) Mixed hyperlipidemia      Pulmonary   (+) Obstructive sleep apnea syndrome      Neuro   (+) Carpal tunnel syndrome on right   (+) Chronic migraine   (+) Depressive disorder   (+) Herniation of cervical intervertebral disc with radiculopathy   (+) Lumbar radiculopathy      GI   (+) Irritable bowel syndrome      Liver   (+) Nonalcoholic fatty liver disease      Musculoskeletal   (+) Carpal tunnel syndrome on right   (+) Lumbar spondylosis   (+) Spinal stenosis of cervical region       Clinical information reviewed:   Tobacco  Allergies  Meds  Problems  Med Hx  Surg Hx  OB Status    Fam Hx  Soc Hx        NPO Detail:  NPO/Void Status  Date of Last Liquid: 10/11/24  Time of Last Liquid: 0930  Date of Last Solid: 10/10/24         Physical Exam    Airway  Mallampati: I  TM distance: >3 FB  Neck ROM: full     Cardiovascular - normal exam     Dental - normal exam     Pulmonary - normal exam     Abdominal - normal exam         Anesthesia Plan    History of general anesthesia?: yes  History of complications of general anesthesia?: no    ASA 2     general     The patient is not a current smoker.    intravenous induction   Postoperative administration of opioids is intended.  Anesthetic plan and risks discussed with patient.    Plan discussed with CRNA.

## 2024-10-11 NOTE — ANESTHESIA POSTPROCEDURE EVALUATION
Patient: Ronda Dean    Procedure Summary       Date: 10/11/24 Room / Location: [facility]    Anesthesia Start: 1227 Anesthesia Stop: 1318    Procedure: slinglysis Diagnosis:       DEBRA (stress urinary incontinence, female)      (DEBRA (stress urinary incontinence, female) [N39.3])    Surgeons: Johan Phillips MD Responsible Provider: LANDY Kim    Anesthesia Type: general ASA Status: 2            Anesthesia Type: general    Vitals Value Taken Time   /66 10/11/24 1340   Temp 36.6 °C (97.9 °F) 10/11/24 1316   Pulse 87 10/11/24 1340   Resp 14 10/11/24 1340   SpO2 100 % 10/11/24 1340       Anesthesia Post Evaluation    Patient location during evaluation: PACU  Patient participation: complete - patient participated  Level of consciousness: awake  Pain score: 0  Pain management: adequate  Airway patency: patent  Cardiovascular status: acceptable  Respiratory status: acceptable  Hydration status: acceptable  Postoperative Nausea and Vomiting: none    No notable events documented.

## 2024-10-14 ASSESSMENT — PAIN SCALES - GENERAL: PAINLEVEL_OUTOF10: 0 - NO PAIN

## 2024-10-14 NOTE — SIGNIFICANT EVENT
Pt stated she has some sensitivity to touch on her sternum with some yellowish bruising. She states it hurts if she pushes on it even slightly. She did not have any of these symptoms prior to surgery. I will refer this to Anesthesia and/or the OR staff.

## 2024-11-12 ENCOUNTER — APPOINTMENT (OUTPATIENT)
Dept: UROLOGY | Facility: CLINIC | Age: 44
End: 2024-11-12
Payer: COMMERCIAL

## 2024-11-12 VITALS — DIASTOLIC BLOOD PRESSURE: 87 MMHG | SYSTOLIC BLOOD PRESSURE: 126 MMHG

## 2024-11-12 DIAGNOSIS — R10.2 PELVIC PAIN IN FEMALE: ICD-10-CM

## 2024-11-12 DIAGNOSIS — Z09 POSTOP CHECK: ICD-10-CM

## 2024-11-12 DIAGNOSIS — M62.89 PELVIC FLOOR DYSFUNCTION: Primary | ICD-10-CM

## 2024-11-12 PROCEDURE — 99211 OFF/OP EST MAY X REQ PHY/QHP: CPT | Performed by: STUDENT IN AN ORGANIZED HEALTH CARE EDUCATION/TRAINING PROGRAM

## 2024-11-12 ASSESSMENT — COLUMBIA-SUICIDE SEVERITY RATING SCALE - C-SSRS
1. IN THE PAST MONTH, HAVE YOU WISHED YOU WERE DEAD OR WISHED YOU COULD GO TO SLEEP AND NOT WAKE UP?: NO
6. HAVE YOU EVER DONE ANYTHING, STARTED TO DO ANYTHING, OR PREPARED TO DO ANYTHING TO END YOUR LIFE?: NO
2. HAVE YOU ACTUALLY HAD ANY THOUGHTS OF KILLING YOURSELF?: NO

## 2024-11-12 ASSESSMENT — ENCOUNTER SYMPTOMS
LOSS OF SENSATION IN FEET: 0
OCCASIONAL FEELINGS OF UNSTEADINESS: 0
DEPRESSION: 0

## 2024-11-12 NOTE — PROGRESS NOTES
HISTORY OF PRESENT ILLNESS:  Ronda Dean is a 44 y.o. female who presents today for a follow up visit. She did not have pelvic pain in the past.  She is status post sling lysis on 10/11/24. She does currently have some pelvic pain, especially with intercourse          Past Medical History  She has a past medical history of Arthritis, Depression, Dysuria (05/08/2023), GERD (gastroesophageal reflux disease), Hyperlipidemia, unspecified (04/14/2016), Kidney stones, Migraines, NAFL (nonalcoholic fatty liver), Neuropathic pain of chest (05/08/2023), Pain of right breast (05/08/2023), Personal history of other diseases of the digestive system (07/07/2020), PONV (postoperative nausea and vomiting), Sleep apnea, TMJ (dislocation of temporomandibular joint), TMJ (temporomandibular joint disorder), Urinary urgency (05/08/2023), and Vaginal discharge (05/08/2023).    Surgical History  She has a past surgical history that includes Total abdominal hysterectomy (04/16/2015); Cervical biopsy w/ loop electrode excision (04/16/2015); Cholecystectomy (04/16/2015); Hysterectomy (09/07/2021); Other surgical history (07/08/2020); Other surgical history (07/08/2020); Other surgical history (07/08/2020); Other surgical history (02/16/2021); and Other surgical history (02/16/2021).     Social History  She reports that she has never smoked. She has never used smokeless tobacco. She reports that she does not drink alcohol and does not use drugs.    Family History  Family History   Problem Relation Name Age of Onset    Diabetes Father      Hypertension Father      Diabetes Father's Sister      Stomach cancer Father's Sister      Diabetes Paternal Grandmother      Hypertension Paternal Grandmother      Coronary artery disease Paternal Grandmother          MI>60s    Diabetes Paternal Cousin          Allergies  Pregabalin, Bupropion, Gabapentin, Hydrochlorothiazide, Naltrexone-bupropion, Ondansetron hcl, Promethazine, Trazodone, and  "Penicillins      A comprehensive 10+ review of systems was negative except for: see hpi                          PHYSICAL EXAMINATION:  BP Readings from Last 3 Encounters:   11/12/24 126/87   10/11/24 107/63   09/25/24 122/80      Wt Readings from Last 3 Encounters:   10/11/24 65.3 kg (144 lb)   09/25/24 65.3 kg (144 lb)   09/23/24 64.4 kg (142 lb)      BMI: Estimated body mass index is 23.96 kg/m² as calculated from the following:    Height as of 10/11/24: 1.651 m (5' 5\").    Weight as of 10/11/24: 65.3 kg (144 lb).  BSA: Estimated body surface area is 1.73 meters squared as calculated from the following:    Height as of 10/11/24: 1.651 m (5' 5\").    Weight as of 10/11/24: 65.3 kg (144 lb).  HEENT: Normocephalic, atraumatic, PER EOMI, nonicteric, trachea normal, thyroid normal, oropharynx normal.  CARDIAC: regular rate & rhythm, S1 & S2 normal.  No heaves, thrills, gallops or murmurs.  LUNGS: Clear to auscultation, no spinal or CV tenderness.  EXTREMITIES: No evidence of cyanosis, clubbing or edema.               Assessment:  44-year-old  with dyspareunia, mixed incontinence, hypertonic pelvic floor     Stress  incontinence  -s/p sling placement 12/22/2023  -Still experiencing some stress incontinence but better than prior,  -S/P sling lysis on 10/11/24, voiding easier, rare DEBRA     -can consider bulking        Urgency:  -has failed Myrbetriq  -improved after lysis     pelvic pain:    -Given uber lube  -Referral to pelvic floor physical therapy  -Rx zanaflex          GUSM:  -Has bad reactions with estrogen including vaginal estrogen  -We can try Osphena if persists       Follow up in 3 months        All questions and concerns were answered and addressed.  The patient expressed understanding and agrees with the plan.     Johan Phillips MD    Scribe Attestation  By signing my name below, I, Radha Vallecillo, Scrtyrone   attest that this documentation has been prepared under the direction and in the presence of Johan Phillips, " MD.

## 2024-11-18 ENCOUNTER — OFFICE VISIT (OUTPATIENT)
Dept: PRIMARY CARE | Facility: CLINIC | Age: 44
End: 2024-11-18
Payer: COMMERCIAL

## 2024-11-18 VITALS
WEIGHT: 142 LBS | OXYGEN SATURATION: 100 % | TEMPERATURE: 97.6 F | HEART RATE: 72 BPM | BODY MASS INDEX: 23.63 KG/M2 | SYSTOLIC BLOOD PRESSURE: 120 MMHG | DIASTOLIC BLOOD PRESSURE: 72 MMHG

## 2024-11-18 DIAGNOSIS — R07.81 RIB PAIN: ICD-10-CM

## 2024-11-18 DIAGNOSIS — M62.838 MUSCLE SPASM: ICD-10-CM

## 2024-11-18 DIAGNOSIS — N23 KIDNEY PAIN: Primary | ICD-10-CM

## 2024-11-18 LAB
POC APPEARANCE, URINE: CLEAR
POC BILIRUBIN, URINE: NEGATIVE
POC BLOOD, URINE: NEGATIVE
POC COLOR, URINE: YELLOW
POC GLUCOSE, URINE: NEGATIVE MG/DL
POC KETONES, URINE: NEGATIVE MG/DL
POC LEUKOCYTES, URINE: ABNORMAL
POC NITRITE,URINE: NEGATIVE
POC PH, URINE: 6 PH
POC PROTEIN, URINE: NEGATIVE MG/DL
POC SPECIFIC GRAVITY, URINE: 1.02
POC UROBILINOGEN, URINE: 0.2 EU/DL

## 2024-11-18 PROCEDURE — 1036F TOBACCO NON-USER: CPT | Performed by: FAMILY MEDICINE

## 2024-11-18 PROCEDURE — 81003 URINALYSIS AUTO W/O SCOPE: CPT | Performed by: FAMILY MEDICINE

## 2024-11-18 PROCEDURE — 87086 URINE CULTURE/COLONY COUNT: CPT

## 2024-11-18 PROCEDURE — 99214 OFFICE O/P EST MOD 30 MIN: CPT | Performed by: FAMILY MEDICINE

## 2024-11-18 RX ORDER — CYCLOBENZAPRINE HCL 10 MG
5-10 TABLET ORAL 2 TIMES DAILY PRN
Qty: 14 TABLET | Refills: 0 | Status: SHIPPED | OUTPATIENT
Start: 2024-11-18 | End: 2024-12-18

## 2024-11-18 ASSESSMENT — ENCOUNTER SYMPTOMS
DYSURIA: 0
DIZZINESS: 0
SHORTNESS OF BREATH: 0
DYSPHORIC MOOD: 0
ABDOMINAL PAIN: 0
CONSTIPATION: 0
FATIGUE: 0
VOMITING: 0
NAUSEA: 0
PALPITATIONS: 0
HEADACHES: 0
DIARRHEA: 0
SLEEP DISTURBANCE: 0

## 2024-11-18 NOTE — PATIENT INSTRUCTIONS
Start flexeril. Do not mix with tizanidine    Start as needed meloxicam    Go to the ER if any of your symptoms are significantly worsening.     Follow up with your specialists as scheduled    Return as scheduled, sooner if needed

## 2024-11-18 NOTE — PROGRESS NOTES
"Subjective   Patient ID: Ronda Dean is a 44 y.o. female who presents for Rib Injury (Discuss rib \"popping\" discomfort x 1 month) and multiple issues    HPI   Rib pain/spasms: Repeating onset x 1 month after had bladder surgery.  Feels popping in her left anterior ribs with some pain.  No fall or injury.  Having spasms in her left lower back.  Felt pain after lifting.  No radiation down legs.  No bowel or bladder changes.  Tizanidine not helping.  Flexeril has helped in the past.  Taking Tylenol without relief.    Review of Systems   Constitutional:  Negative for fatigue.   Eyes:  Negative for visual disturbance.   Respiratory:  Negative for shortness of breath.    Cardiovascular:  Negative for chest pain and palpitations.   Gastrointestinal:  Negative for abdominal pain, constipation, diarrhea, nausea and vomiting.   Genitourinary:  Negative for dysuria.   Skin:  Negative for rash.   Neurological:  Negative for dizziness and headaches.   Psychiatric/Behavioral:  Negative for dysphoric mood and sleep disturbance.        Objective   /72   Pulse 72   Temp 36.4 °C (97.6 °F)   Wt 64.4 kg (142 lb)   SpO2 100%   BMI 23.63 kg/m²     Physical Exam  Vitals and nursing note reviewed.   Constitutional:       General: She is not in acute distress.     Appearance: Normal appearance. She is not toxic-appearing.   HENT:      Head: Normocephalic.   Eyes:      Pupils: Pupils are equal, round, and reactive to light.   Cardiovascular:      Rate and Rhythm: Normal rate and regular rhythm.      Heart sounds: No murmur heard.  Pulmonary:      Effort: Pulmonary effort is normal. No respiratory distress.      Breath sounds: Normal breath sounds.   Abdominal:      Palpations: Abdomen is soft.      Tenderness: There is abdominal tenderness (Upper left quadrant). There is left CVA tenderness. There is no right CVA tenderness or guarding.   Musculoskeletal:         General: Tenderness (multple tender points left lower back) " present.      Right lower leg: No edema.      Left lower leg: No edema.      Comments: Mild tenderness left anterior inferior ribs.  No crepitus   Skin:     General: Skin is warm.   Neurological:      General: No focal deficit present.      Mental Status: She is alert.      Cranial Nerves: No cranial nerve deficit.   Psychiatric:         Mood and Affect: Mood normal.         Assessment/Plan   Problem List Items Addressed This Visit    None  Visit Diagnoses         Codes    Kidney pain    -  Primary N23    Relevant Orders    POCT UA Automated manually resulted (Completed)    Urine Culture    Muscle spasm     M62.838    Relevant Medications    cyclobenzaprine (Flexeril) 10 mg tablet    Rib pain     R07.81        Discussed side effects of meds.  Recommend she does not mix with tizanidine.  Recommend she try Mobic.  Has Rx at home declines steroid taper.

## 2024-11-20 LAB — BACTERIA UR CULT: NO GROWTH

## 2024-11-22 ENCOUNTER — TELEPHONE (OUTPATIENT)
Dept: PRIMARY CARE | Facility: CLINIC | Age: 44
End: 2024-11-22
Payer: COMMERCIAL

## 2024-11-22 NOTE — TELEPHONE ENCOUNTER
Pt called stating she is still in pain and iel told her to cb if she is still hurting and she would order testing. Please advise

## 2024-11-27 ENCOUNTER — APPOINTMENT (OUTPATIENT)
Dept: OBSTETRICS AND GYNECOLOGY | Facility: CLINIC | Age: 44
End: 2024-11-27
Payer: COMMERCIAL

## 2024-11-27 NOTE — TELEPHONE ENCOUNTER
Called and spoke with pt, per pt IEL told her that she would order imaging if pain is not any better. Pain is still on the L side. Pt requesting order for imaging. Informed to go to ER if pain becomes severe. Thanks. JW

## 2024-12-12 DIAGNOSIS — R07.81 RIB PAIN: Primary | ICD-10-CM

## 2024-12-12 DIAGNOSIS — R07.89 CHEST WALL PAIN: ICD-10-CM

## 2024-12-14 ENCOUNTER — HOSPITAL ENCOUNTER (OUTPATIENT)
Dept: RADIOLOGY | Facility: HOSPITAL | Age: 44
Discharge: HOME | End: 2024-12-14
Payer: COMMERCIAL

## 2024-12-14 DIAGNOSIS — R07.81 RIB PAIN: ICD-10-CM

## 2024-12-14 DIAGNOSIS — R07.89 CHEST WALL PAIN: ICD-10-CM

## 2024-12-14 PROCEDURE — 71046 X-RAY EXAM CHEST 2 VIEWS: CPT

## 2024-12-14 PROCEDURE — 71046 X-RAY EXAM CHEST 2 VIEWS: CPT | Performed by: RADIOLOGY

## 2024-12-14 PROCEDURE — 71100 X-RAY EXAM RIBS UNI 2 VIEWS: CPT | Mod: LT

## 2024-12-14 PROCEDURE — 71100 X-RAY EXAM RIBS UNI 2 VIEWS: CPT | Mod: LEFT SIDE | Performed by: RADIOLOGY

## 2024-12-23 ENCOUNTER — LAB (OUTPATIENT)
Dept: LAB | Facility: LAB | Age: 44
End: 2024-12-23
Payer: COMMERCIAL

## 2024-12-23 DIAGNOSIS — N39.0 RECURRENT UTI: ICD-10-CM

## 2024-12-23 DIAGNOSIS — E78.2 MIXED HYPERLIPIDEMIA: ICD-10-CM

## 2024-12-23 LAB
ALBUMIN SERPL BCP-MCNC: 4.5 G/DL (ref 3.4–5)
ALP SERPL-CCNC: 76 U/L (ref 33–110)
ALT SERPL W P-5'-P-CCNC: 24 U/L (ref 7–45)
ANION GAP SERPL CALC-SCNC: 11 MMOL/L (ref 10–20)
AST SERPL W P-5'-P-CCNC: 19 U/L (ref 9–39)
BILIRUB SERPL-MCNC: 1.5 MG/DL (ref 0–1.2)
BUN SERPL-MCNC: 10 MG/DL (ref 6–23)
CALCIUM SERPL-MCNC: 9.3 MG/DL (ref 8.6–10.3)
CHLORIDE SERPL-SCNC: 106 MMOL/L (ref 98–107)
CHOLEST SERPL-MCNC: 163 MG/DL (ref 0–199)
CHOLESTEROL/HDL RATIO: 3
CO2 SERPL-SCNC: 26 MMOL/L (ref 21–32)
CREAT SERPL-MCNC: 0.9 MG/DL (ref 0.5–1.05)
EGFRCR SERPLBLD CKD-EPI 2021: 82 ML/MIN/1.73M*2
GLUCOSE SERPL-MCNC: 70 MG/DL (ref 74–99)
HDLC SERPL-MCNC: 54.5 MG/DL
LDLC SERPL CALC-MCNC: 88 MG/DL
NON HDL CHOLESTEROL: 109 MG/DL (ref 0–149)
POTASSIUM SERPL-SCNC: 4.2 MMOL/L (ref 3.5–5.3)
PROT SERPL-MCNC: 6.5 G/DL (ref 6.4–8.2)
SODIUM SERPL-SCNC: 139 MMOL/L (ref 136–145)
TRIGL SERPL-MCNC: 103 MG/DL (ref 0–149)
VLDL: 21 MG/DL (ref 0–40)

## 2024-12-23 PROCEDURE — 36415 COLL VENOUS BLD VENIPUNCTURE: CPT

## 2024-12-23 PROCEDURE — 87086 URINE CULTURE/COLONY COUNT: CPT

## 2024-12-23 PROCEDURE — 80053 COMPREHEN METABOLIC PANEL: CPT

## 2024-12-23 PROCEDURE — 80061 LIPID PANEL: CPT

## 2024-12-24 LAB — BACTERIA UR CULT: NORMAL

## 2024-12-26 ENCOUNTER — APPOINTMENT (OUTPATIENT)
Dept: PRIMARY CARE | Facility: CLINIC | Age: 44
End: 2024-12-26
Payer: COMMERCIAL

## 2024-12-26 ENCOUNTER — LAB (OUTPATIENT)
Dept: LAB | Facility: LAB | Age: 44
End: 2024-12-26
Payer: COMMERCIAL

## 2024-12-26 VITALS
OXYGEN SATURATION: 99 % | BODY MASS INDEX: 22.8 KG/M2 | WEIGHT: 137 LBS | SYSTOLIC BLOOD PRESSURE: 104 MMHG | DIASTOLIC BLOOD PRESSURE: 68 MMHG | HEART RATE: 78 BPM | TEMPERATURE: 97.4 F

## 2024-12-26 DIAGNOSIS — L67.9: ICD-10-CM

## 2024-12-26 DIAGNOSIS — R10.11 RIGHT UPPER QUADRANT ABDOMINAL PAIN: Primary | ICD-10-CM

## 2024-12-26 DIAGNOSIS — R10.11 RIGHT UPPER QUADRANT ABDOMINAL PAIN: ICD-10-CM

## 2024-12-26 DIAGNOSIS — R74.8 ELEVATED LIVER ENZYMES: ICD-10-CM

## 2024-12-26 DIAGNOSIS — L73.9: ICD-10-CM

## 2024-12-26 DIAGNOSIS — E78.2 MIXED HYPERLIPIDEMIA: ICD-10-CM

## 2024-12-26 DIAGNOSIS — R07.1 PAINFUL RESPIRATION: ICD-10-CM

## 2024-12-26 LAB
BASOPHILS # BLD AUTO: 0.03 X10*3/UL (ref 0–0.1)
BASOPHILS NFR BLD AUTO: 0.8 %
D DIMER PPP FEU-MCNC: 242 NG/ML FEU
EOSINOPHIL # BLD AUTO: 0.04 X10*3/UL (ref 0–0.7)
EOSINOPHIL NFR BLD AUTO: 1.1 %
ERYTHROCYTE [DISTWIDTH] IN BLOOD BY AUTOMATED COUNT: 12.3 % (ref 11.5–14.5)
HCT VFR BLD AUTO: 43.4 % (ref 36–46)
HGB BLD-MCNC: 14.6 G/DL (ref 12–16)
IMM GRANULOCYTES # BLD AUTO: 0 X10*3/UL (ref 0–0.7)
IMM GRANULOCYTES NFR BLD AUTO: 0 % (ref 0–0.9)
LIPASE SERPL-CCNC: 39 U/L (ref 9–82)
LYMPHOCYTES # BLD AUTO: 2.17 X10*3/UL (ref 1.2–4.8)
LYMPHOCYTES NFR BLD AUTO: 59.8 %
MCH RBC QN AUTO: 29.6 PG (ref 26–34)
MCHC RBC AUTO-ENTMCNC: 33.6 G/DL (ref 32–36)
MCV RBC AUTO: 88 FL (ref 80–100)
MONOCYTES # BLD AUTO: 0.31 X10*3/UL (ref 0.1–1)
MONOCYTES NFR BLD AUTO: 8.5 %
NEUTROPHILS # BLD AUTO: 1.08 X10*3/UL (ref 1.2–7.7)
NEUTROPHILS NFR BLD AUTO: 29.8 %
NRBC BLD-RTO: 0 /100 WBCS (ref 0–0)
PLATELET # BLD AUTO: 138 X10*3/UL (ref 150–450)
RBC # BLD AUTO: 4.93 X10*6/UL (ref 4–5.2)
TSH SERPL-ACNC: 0.88 MIU/L (ref 0.44–3.98)
WBC # BLD AUTO: 3.6 X10*3/UL (ref 4.4–11.3)

## 2024-12-26 PROCEDURE — 85379 FIBRIN DEGRADATION QUANT: CPT

## 2024-12-26 PROCEDURE — 84443 ASSAY THYROID STIM HORMONE: CPT

## 2024-12-26 PROCEDURE — 1036F TOBACCO NON-USER: CPT | Performed by: FAMILY MEDICINE

## 2024-12-26 PROCEDURE — 85025 COMPLETE CBC W/AUTO DIFF WBC: CPT

## 2024-12-26 PROCEDURE — 99214 OFFICE O/P EST MOD 30 MIN: CPT | Performed by: FAMILY MEDICINE

## 2024-12-26 PROCEDURE — 83690 ASSAY OF LIPASE: CPT

## 2024-12-26 ASSESSMENT — ENCOUNTER SYMPTOMS
SLEEP DISTURBANCE: 0
NAUSEA: 0
DYSPHORIC MOOD: 0
PALPITATIONS: 0
FATIGUE: 0
DIZZINESS: 0
DIARRHEA: 0
COUGH: 0
CONSTIPATION: 0
BLOOD IN STOOL: 0
VOMITING: 0
DYSURIA: 0
FEVER: 0
HEADACHES: 0
DIFFICULTY URINATING: 0
MYALGIAS: 0

## 2024-12-26 NOTE — PROGRESS NOTES
Subjective   Patient ID: Ronda Dean is a 44 y.o. female who presents for Hyperlipidemia (Recheck, review labs.) and multiple issues    Hyperlipidemia  Pertinent negatives include no chest pain or myalgias.      Fatty liver: bili sl high 1.5. still having RUQ discomfort.  No longer with gallbladder.  Prior liver consult reviewed.  Hepatology recommended diet and exercise.  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  Has been on semaglutide but does not feel related  Lipids: now nml. HDL 54, LDL 88(120),   Hair: Reports thinning hair.  Falling out more but no bald areas.  Painful respiration: Recurrent.  Hurts mostly right upper quadrant when takes deep breaths.  No recent travel.  Occasionally feels short of breath with activity    Review of Systems   Constitutional:  Negative for fatigue and fever.   HENT: Negative.     Eyes:  Negative for visual disturbance.   Respiratory:  Negative for cough.    Cardiovascular:  Negative for chest pain and palpitations.   Gastrointestinal:  Negative for blood in stool, constipation, diarrhea, nausea and vomiting.   Genitourinary:  Negative for difficulty urinating and dysuria.   Musculoskeletal:  Negative for myalgias.   Skin:  Negative for rash.   Neurological:  Negative for dizziness and headaches.   Psychiatric/Behavioral:  Negative for dysphoric mood and sleep disturbance.        Objective   /68   Pulse 78   Temp 36.3 °C (97.4 °F)   Wt 62.1 kg (137 lb)   SpO2 99%   BMI 22.80 kg/m²     Physical Exam  Vitals and nursing note reviewed.   Constitutional:       General: She is not in acute distress.     Appearance: Normal appearance. She is not toxic-appearing.   HENT:      Head: Normocephalic.      Mouth/Throat:      Pharynx: Oropharynx is clear.   Eyes:      General: No scleral icterus.     Pupils: Pupils are equal, round, and reactive to light.   Cardiovascular:      Rate and Rhythm: Normal rate and regular rhythm.      Heart sounds: No murmur  heard.  Pulmonary:      Effort: Pulmonary effort is normal. No respiratory distress.      Breath sounds: Normal breath sounds.   Abdominal:      General: Bowel sounds are normal.      Palpations: Abdomen is soft.      Tenderness: There is abdominal tenderness (Right upper quadrant and epigastric area). There is no guarding.   Musculoskeletal:         General: No tenderness.      Right lower leg: No edema.      Left lower leg: No edema.   Skin:     General: Skin is warm.   Neurological:      General: No focal deficit present.      Mental Status: She is alert.      Cranial Nerves: No cranial nerve deficit.   Psychiatric:         Mood and Affect: Mood normal.         Assessment/Plan   Problem List Items Addressed This Visit             ICD-10-CM    Mixed hyperlipidemia E78.2    Relevant Orders    Comprehensive Metabolic Panel    Elevated liver enzymes R74.8    Relevant Orders    Comprehensive Metabolic Panel     Other Visit Diagnoses         Codes    Right upper quadrant abdominal pain    -  Primary R10.11    Relevant Orders    US abdomen limited    CBC and Auto Differential (Completed)    Lipase (Completed)    Painful respiration     R07.1    Relevant Orders    D-dimer, quantitative (Completed)    Hair and hair follicle disorder     L67.9, L73.9    Relevant Orders    Tsh With Reflex To Free T4 If Abnormal (Completed)        Reviewed blood work, imaging and prior consults.  Recommendations given

## 2024-12-26 NOTE — PATIENT INSTRUCTIONS
Recommend a predominant low fat whole foods plant based diet.  Cut back on meat, dairy, processed carbs, salt and oils(especially palm and coconut). Increase fiber in your diet.  Decrease alcohol as much as possible if you drink. Recommend regular exercise most days of the week(goal up to 150min per week). Also recommend good sleep habits aiming for 7-8 hours per night.     You were referred for US abdomen and further blood work    Go to the ER if any of your symptoms are significantly worsening.     Follow up with your specialists as scheduled    Restart omeprazole    Consider trial off wegovy to see if this helps your stomach issues    Return in 6 months, sooner if needed

## 2024-12-27 ENCOUNTER — TELEPHONE (OUTPATIENT)
Dept: PRIMARY CARE | Facility: CLINIC | Age: 44
End: 2024-12-27
Payer: COMMERCIAL

## 2024-12-27 ENCOUNTER — HOSPITAL ENCOUNTER (OUTPATIENT)
Dept: RADIOLOGY | Facility: HOSPITAL | Age: 44
Discharge: HOME | End: 2024-12-27
Payer: COMMERCIAL

## 2024-12-27 DIAGNOSIS — R10.11 RIGHT UPPER QUADRANT ABDOMINAL PAIN: ICD-10-CM

## 2024-12-27 PROCEDURE — 76705 ECHO EXAM OF ABDOMEN: CPT

## 2024-12-27 NOTE — TELEPHONE ENCOUNTER
----- Message from Abiola Muhammad sent at 12/26/2024  8:31 PM EST -----  Please tell patient that her blood work overall looked okay.  White count slightly low but can be monitored.  Platelet count just slightly low 138.  Normal 150.  Should monitor for easy bruising.  Thyroid level normal

## 2025-01-07 ENCOUNTER — APPOINTMENT (OUTPATIENT)
Dept: PRIMARY CARE | Facility: CLINIC | Age: 45
End: 2025-01-07
Payer: COMMERCIAL

## 2025-01-09 ENCOUNTER — APPOINTMENT (OUTPATIENT)
Facility: CLINIC | Age: 45
End: 2025-01-09
Payer: COMMERCIAL

## 2025-01-22 ENCOUNTER — APPOINTMENT (OUTPATIENT)
Facility: CLINIC | Age: 45
End: 2025-01-22
Payer: COMMERCIAL

## 2025-01-22 ENCOUNTER — LAB (OUTPATIENT)
Dept: LAB | Facility: LAB | Age: 45
End: 2025-01-22
Payer: COMMERCIAL

## 2025-01-22 VITALS
HEIGHT: 65 IN | DIASTOLIC BLOOD PRESSURE: 82 MMHG | HEART RATE: 76 BPM | WEIGHT: 138.8 LBS | SYSTOLIC BLOOD PRESSURE: 119 MMHG | BODY MASS INDEX: 23.13 KG/M2 | OXYGEN SATURATION: 98 %

## 2025-01-22 DIAGNOSIS — K52.9 CHRONIC DIARRHEA: ICD-10-CM

## 2025-01-22 DIAGNOSIS — N39.0 RECURRENT UTI: ICD-10-CM

## 2025-01-22 DIAGNOSIS — R19.4 CHANGE IN BOWEL HABITS: Primary | ICD-10-CM

## 2025-01-22 DIAGNOSIS — R11.2 NAUSEA AND VOMITING, UNSPECIFIED VOMITING TYPE: ICD-10-CM

## 2025-01-22 DIAGNOSIS — R10.13 EPIGASTRIC PAIN: ICD-10-CM

## 2025-01-22 PROCEDURE — 99214 OFFICE O/P EST MOD 30 MIN: CPT | Performed by: INTERNAL MEDICINE

## 2025-01-22 PROCEDURE — 3008F BODY MASS INDEX DOCD: CPT | Performed by: INTERNAL MEDICINE

## 2025-01-22 PROCEDURE — 1036F TOBACCO NON-USER: CPT | Performed by: INTERNAL MEDICINE

## 2025-01-22 PROCEDURE — 87086 URINE CULTURE/COLONY COUNT: CPT

## 2025-01-22 NOTE — H&P (VIEW-ONLY)
"    [facility] Gastroenterology    ASSESSMENT and PLAN:       Ronda Dean is a 44 y.o. female with a significant past medical history of obstructive sleep apnea, mixed hyperlipidemia, bipolar, migraines, pelvic floor dysfunction history of hysterectomy, chronic right upper quadrant abdominal pain IBS who presents for consultation requested by her primary care provider (Abiola Muhammad DO) for the evaluation of right upper quadrant fullness.          1, Epigastric Pain  -  functional dyspepsia vs possible sphincter of Oddi dysfunction  - EGD for evaluation if negative obtain gastric emptying study   - Risk and benefits of EGD including bleeding perforation and infection were discussed with patient and they wish to proceed   -History of subepithelial gastric mass repeat EUS in 2023 did not show any mass was thought to be possibly due to inflammation original EUS repeat EGD now for evaluation  -    2. Rectal Bleeding/change in bowel habits/diarrhea  -Colonoscopy in 2021 antibodies were negative patient admits to increased amount of diarrhea and rectal bleeding  -Repeat colonoscopy now for evaluation        Giancarlo Ramirez DO         Gastroenterology    [facility]            Subjective   HISTORY OF PRESENT ILLNESS:     Chief Complaint  New Patient Visit (\"Full feeling under ribs\"./Fibroscan 1/15/24(Ronen).  Abdominal ultrasound 12/26/24/Colon-5/4/21, EGD 1/19/20021./Last seen with Dr. Hardwick 12/5/2023)    History Of Present Illness:    Ronda Dean is a 44 y.o. female with a significant past medical history of obstructive sleep apnea, mixed hyperlipidemia, bipolar, migraines, pelvic floor dysfunction history of hysterectomy, chronic right upper quadrant abdominal pain IBS who presents for consultation requested by her primary care provider (Abiola Muhammad DO) for the evaluation of right upper quadrant fullness.         Patient was last seen by Dr. Lauren " in October 2023.  Also this patient has chronic right upper quadrant pain etiology unclear was referred to hepatology.  Moved from September 2023 this shows that she has right upper quadrant pain intermittent unrelated to meals and it hurts to bend over and she is tired all the time.    Patient has also follows with Ara Wheeler at that time patient extensive workup and no clear diagnosis for abdominal pain-found.    Patient has been on Wevoy since March     The patient has a very long history of right upper quadrant abdominal pain. She has been seen by a multitude of gastroenterologists in different healthcare systems. She has undergone extensive workup in the past which has not been overly revealing. She has also undergone a multitude of endoscopic procedures by a variety of different gastroenterologists which have not been overly revealing. She did have her gallbladder removed about 20 years ago. She thinks that the pain for the last several years is different than the gallbladder pain.     Patient denies any heartburn/GERD, N/V, dysphagia, odynophagia, abdominal pain, diarrhea, constipation, hematemesis, hematochezia, melena, or weight loss.      Endoscopy History:  EGD 1/2010: normal  -Colonoscopy 1/2010: normal  -EGD 2/2012: normal esophagus, scattered inflammation, gastric erosions, normal duodenum  -EGD 5/2014: normal esophagus, normal stomach, normal duodenum  -Colonoscopy 6/2014: normal colon  -EGD 1/2018: normal esophagus, gastritis, normal duodenum  -EGD 1/2021: normal esophagus; concern for PHG, gastric submucosal mass, normal duodenum; biopsies unremarkable.  -EUS 1/2021: normal esophagus; 1 cm hiatal hernia; erythematous gastric mucosa; soft bulge which possibly a GIST versus leiomyoma; normal duodenum; normal pancreas and bile duct. Biopsies showed gastritis and were otherwise normal. Repeat EUS due 1 year for surveillance.  -Colonoscopy 5/4/2021: normal terminal ileum and normal colonic mucosa;  random biopsies were negative for microscopic colitis.  - EGD 11/5/2021 by Dr. Nain Gallagher. EGD report not available, however biopsy report noted normal duodenum with negative celiac sprue; and minimal focal chronic inflammation of the gastric antrum; negative H.pylori.      PAST SURGERIES:  -Laparoscopic Cholecystectomy 2009  -Total hysterectomy 4/2012  - Umbilical hernia repair x 2 and adhesions (most recent 4/2018)  - Laparoscopy for adhesions 12/2021 (Dr. Luis E Desai at [facility]).      OTHER RECENT IMAGING:  -MRCP 1/19/2020: s/p cholecystectomy; no filling defects; mild hepatosplenomegaly unchanged since 2011.  -Xray Abdomen 2/18/2021: nonobstructive bowl gas pattern; moderate to large amount of residual stool over the distal colon. Clinical correlation for constipation recommended.  - Ultrasound RUQ 8/23/2021: no focal hepatic lesion identified; s/p cholecystectomy. No biliary dilation. Possible small nonobstructing left renal calculus. No hydronephrosis bilaterally.  -CT abdomen pelvis 10/2023 with IV contrast showed normal appendix no bowel obstruction no hydro ureter nephrosis bilaterally.  -Vascular ultrasound done 10/2023 showed previous cholecystectomy, mildly elevated main hepatic artery resistive index Doppler portion exams otherwise within the limits of normal  -CT abdomen/pelvis with IV contrast 10/21/2021: no acute abdominal or pelvic process. Liver within normal limits. Bile ducts normal caliber. Pancreas within normal limits.  CT abdomen pelvis without IV contrast 9/2024 nonobstructive left-sided nephrolithiasis no hydronephrosis or signs obstructive uropathy was seen  Right upper quadrant ultrasound 12/2024 showed Ana cystectomy changes no correlation seen to explain pain      Review of systems:   Review of Systems      I performed a complete 10 point review of systems and it is negative except as noted in HPI or above.        PAST HISTORIES:       Past Medical History:  She has  a past medical history of Arthritis, Depression, Dysuria (05/08/2023), GERD (gastroesophageal reflux disease), Hyperlipidemia, unspecified (04/14/2016), Kidney stones, Migraines, NAFL (nonalcoholic fatty liver), Neuropathic pain of chest (05/08/2023), Pain of right breast (05/08/2023), Personal history of other diseases of the digestive system (07/07/2020), PONV (postoperative nausea and vomiting), Sleep apnea, TMJ (dislocation of temporomandibular joint), TMJ (temporomandibular joint disorder), Urinary urgency (05/08/2023), and Vaginal discharge (05/08/2023).    Past Surgical History:  She has a past surgical history that includes Total abdominal hysterectomy (04/16/2015); Cervical biopsy w/ loop electrode excision (04/16/2015); Cholecystectomy (04/16/2015); Hysterectomy (09/07/2021); Other surgical history (07/08/2020); Other surgical history (07/08/2020); Other surgical history (07/08/2020); Other surgical history (02/16/2021); and Other surgical history (02/16/2021).      Social History:  She reports that she has never smoked. She has never used smokeless tobacco. She reports that she does not drink alcohol and does not use drugs.    Family History:  No known GI disease, specifically denies pancreatitis, Crohn's, colon cancer, gastroesophageal cancer, or ulcerative colitis.    Family History   Problem Relation Name Age of Onset   • Diabetes Father     • Hypertension Father     • Diabetes Father's Sister     • Stomach cancer Father's Sister     • Diabetes Paternal Grandmother     • Hypertension Paternal Grandmother     • Coronary artery disease Paternal Grandmother          MI>60s   • Diabetes Paternal Cousin          Allergies:  Pregabalin, Bupropion, Gabapentin, Hydrochlorothiazide, Naltrexone-bupropion, Ondansetron hcl, Promethazine, Trazodone, and Penicillins        Objective   OBJECTIVE:       Last Recorded Vitals:  There were no vitals filed for this visit.  There were no vitals taken for this visit.      Physical Exam:    Physical Exam  Vitals reviewed.   Constitutional:       General: She is awake.      Appearance: Normal appearance.   HENT:      Head: Normocephalic.      Mouth/Throat:      Mouth: Mucous membranes are moist.   Eyes:      Extraocular Movements: Extraocular movements intact.   Cardiovascular:      Rate and Rhythm: Normal rate.      Heart sounds: Normal heart sounds.   Pulmonary:      Effort: Pulmonary effort is normal.      Breath sounds: Normal breath sounds.   Abdominal:      General: Bowel sounds are normal.      Palpations: Abdomen is soft.      Tenderness: There is no abdominal tenderness. There is no guarding or rebound.      Hernia: No hernia is present.   Musculoskeletal:         General: Normal range of motion.      Cervical back: Neck supple.   Skin:     General: Skin is warm and dry.   Neurological:      General: No focal deficit present.      Mental Status: She is alert.   Psychiatric:         Attention and Perception: Attention and perception normal.         Mood and Affect: Mood normal.         Behavior: Behavior normal.           Home Medications:  Prior to Admission medications    Medication Sig Start Date End Date Taking? Authorizing Provider   acetaminophen (Tylenol) 500 mg tablet Take 2 tablets (1,000 mg) by mouth every 6 hours if needed for mild pain (1 - 3). 12/22/23   Julia Stapleton MD   cholecalciferol (Vitamin D-3) 50 MCG (2000 UT) tablet Take 1 tablet (50 mcg) by mouth once daily.    Historical Provider, MD   cyclobenzaprine (Flexeril) 10 mg tablet Take 0.5-1 tablets (5-10 mg) by mouth 2 times a day as needed for muscle spasms. 11/18/24 12/18/24  Abiola Muhammad DO   docusate sodium (Colace) 100 mg capsule Take 2 capsules (200 mg) by mouth 2 times a day. 10/11/24   Frances Gardner MD   famotidine (Pepcid) 40 mg tablet Take 1 tablet (40 mg) by mouth once daily at bedtime.    Historical Provider, MD   ibuprofen 600 mg tablet Take 1 tablet (600 mg) by mouth every 8  hours if needed. W/ food and water    Historical Provider, MD   ketorolac (Toradol) 10 mg tablet Take 1 tablet (10 mg) by mouth every 6 hours if needed for moderate pain (4 - 6). 10/11/24   Frances Gardner MD   Lactobacillus acidophilus (PROBIOTIC ACIDOPHILUS ORAL) Probiotic Acidophilus oral capsules    Historical Provider, MD   magnesium hydroxide (Milk of Magnesia) 400 mg/5 mL suspension Shake Well and Take 30 mL by mouth once daily as needed for constipation (if no bowel movementn with colace and miralax). 10/11/24   Frances Gardner MD   meloxicam (Mobic) 15 mg tablet Take 1 tablet (15 mg) by mouth once daily. 4/17/24 4/17/25  Andrew Tyler DO   omeprazole (PriLOSEC) 40 mg DR capsule Take 1 capsule (40 mg) by mouth once daily. 7/11/22   Historical Provider, MD   ospemifene (Osphena) 60 mg tablet Take 60 mg by mouth once daily. 5/3/24 5/3/25  Johan Phillips MD   polyethylene glycol (Glycolax, Miralax) 17 gram/dose powder Dissolve 17 grams in liquid as directed and take by mouth once daily. 10/11/24   Frances Gardner MD   semaglutide, weight loss, (Wegovy) 0.25 mg/0.5 mL pen injector Inject 0.25 mg under the skin. 5/4/24      tiZANidine (Zanaflex) 4 mg tablet Take 1 tablet (4 mg) by mouth every 6 hours if needed for muscle spasms. 9/17/24 3/16/25  Johan Phillips MD   traMADol (Ultram) 50 mg tablet Take 1 tablet (50 mg) by mouth every 6 hours if needed for severe pain (7 - 10). 10/11/24   Frances Gardner MD   VITAMIN B COMPLEX ORAL Vitamin B Complex oral tablets    Historical Provider, MD         Relevant Results Recent labs reviewed in the EMR.  Lab Results   Component Value Date    HGB 14.6 12/26/2024    HGB 14.8 09/23/2024    HGB 13.9 04/08/2024    MCV 88 12/26/2024    MCV 89 09/23/2024    MCV 91 04/08/2024     (L) 12/26/2024     09/23/2024     04/08/2024       Lab Results   Component Value Date    FERRITIN 199 (H) 09/19/2023    IRON 76 09/19/2023       Lab Results   Component Value  Date     12/23/2024    K 4.2 12/23/2024     12/23/2024    BUN 10 12/23/2024    CREATININE 0.90 12/23/2024       Lab Results   Component Value Date    BILITOT 1.5 (H) 12/23/2024     Lab Results   Component Value Date    ALT 24 12/23/2024    ALT 25 06/15/2024    ALT 59 (H) 04/08/2024    AST 19 12/23/2024    AST 21 06/15/2024    AST 36 04/08/2024    ALKPHOS 76 12/23/2024    ALKPHOS 70 06/15/2024    ALKPHOS 76 04/08/2024       Lab Results   Component Value Date    CRP <0.10 02/09/2021    CRP <0.10 11/09/2019       Calprotectin, Stool   Date Value Ref Range Status   02/09/2021 19 <=49 ug/g Final     Comment:     REFERENCE INTERVAL: Calprotectin, Fecal by Immunoassay    Less than 50 ug/g.........Normal     ug/g...............Borderline elevated, test should be                              re-evaluated in 4-6 weeks.    121 ug/g or greater.......Elevated  Performed by [facility],  [address] [place] [id number] [phone]  [email or web address], Rio Childs MD, Lab. Director         Radiology: Reviewed imaging reviewed in the EMR.  US abdomen limited    Result Date: 12/29/2024  Interpreted By:  Antonio Hewitt, STUDY: US ABDOMEN LIMITED;  12/27/2024 7:33 am   INDICATION: Signs/Symptoms:pain, high LFT.   ,R10.11 Right upper quadrant pain   COMPARISON: September 24 CT   ACCESSION NUMBER(S): [id number]   ORDERING CLINICIAN: HOSEA JAMES   TECHNIQUE: Multiple images of the right upper quadrant were obtained.   FINDINGS: LIVER: The liver measures 15.1 cm no focal liver lesions     GALLBLADDER: Removed.     BILE DUCTS: No evidence of intra or extrahepatic biliary dilatation is identified; the common bile duct measures 0.2 cm. 1.1 cm rounded lesion seen in the right kidney. This is probably a cyst. It is difficult to say with certainty. Consider follow-up in 6 months.   PANCREAS: Not well seen   RIGHT KIDNEY: The right kidney measures 9.6 cm in length. The renal cortical echogenicity and thickness are  within normal limit.  No hydronephrosis or renal calculi are seen.       Cholecystectomy changes. No correlate seen to explain pain.   1 cm right renal cyst probably a cyst with proteinaceous debris. Review of prior CT did not show a similar cyst. Consider follow-up renal ultrasound in 6 months to ensure stability.   MACRO: None   Signed by: Antonio Hewitt 12/29/2024 5:37 AM Dictation workstation:   [id number]

## 2025-01-22 NOTE — PROGRESS NOTES
"    [facility] Gastroenterology    ASSESSMENT and PLAN:       Ronda Dean is a 44 y.o. female with a significant past medical history of obstructive sleep apnea, mixed hyperlipidemia, bipolar, migraines, pelvic floor dysfunction history of hysterectomy, chronic right upper quadrant abdominal pain IBS who presents for consultation requested by her primary care provider (Abiola Muhammad DO) for the evaluation of right upper quadrant fullness.          1, Epigastric Pain  -  functional dyspepsia vs possible sphincter of Oddi dysfunction  - EGD for evaluation if negative obtain gastric emptying study   - Risk and benefits of EGD including bleeding perforation and infection were discussed with patient and they wish to proceed   -History of subepithelial gastric mass repeat EUS in 2023 did not show any mass was thought to be possibly due to inflammation original EUS repeat EGD now for evaluation  -    2. Rectal Bleeding/change in bowel habits/diarrhea  -Colonoscopy in 2021 antibodies were negative patient admits to increased amount of diarrhea and rectal bleeding  -Repeat colonoscopy now for evaluation        Giancarlo Ramirez DO         Gastroenterology    [facility]            Subjective   HISTORY OF PRESENT ILLNESS:     Chief Complaint  New Patient Visit (\"Full feeling under ribs\"./Fibroscan 1/15/24(Ronen).  Abdominal ultrasound 12/26/24/Colon-5/4/21, EGD 1/19/20021./Last seen with Dr. Hardwick 12/5/2023)    History Of Present Illness:    Ronda Dean is a 44 y.o. female with a significant past medical history of obstructive sleep apnea, mixed hyperlipidemia, bipolar, migraines, pelvic floor dysfunction history of hysterectomy, chronic right upper quadrant abdominal pain IBS who presents for consultation requested by her primary care provider (Abiola Muhammad DO) for the evaluation of right upper quadrant fullness.         Patient was last seen by Dr. Lauren " in October 2023.  Also this patient has chronic right upper quadrant pain etiology unclear was referred to hepatology.  Moved from September 2023 this shows that she has right upper quadrant pain intermittent unrelated to meals and it hurts to bend over and she is tired all the time.    Patient has also follows with Ara Wheeler at that time patient extensive workup and no clear diagnosis for abdominal pain-found.    Patient has been on Wevoy since March     The patient has a very long history of right upper quadrant abdominal pain. She has been seen by a multitude of gastroenterologists in different healthcare systems. She has undergone extensive workup in the past which has not been overly revealing. She has also undergone a multitude of endoscopic procedures by a variety of different gastroenterologists which have not been overly revealing. She did have her gallbladder removed about 20 years ago. She thinks that the pain for the last several years is different than the gallbladder pain.     Patient denies any heartburn/GERD, N/V, dysphagia, odynophagia, abdominal pain, diarrhea, constipation, hematemesis, hematochezia, melena, or weight loss.      Endoscopy History:  EGD 1/2010: normal  -Colonoscopy 1/2010: normal  -EGD 2/2012: normal esophagus, scattered inflammation, gastric erosions, normal duodenum  -EGD 5/2014: normal esophagus, normal stomach, normal duodenum  -Colonoscopy 6/2014: normal colon  -EGD 1/2018: normal esophagus, gastritis, normal duodenum  -EGD 1/2021: normal esophagus; concern for PHG, gastric submucosal mass, normal duodenum; biopsies unremarkable.  -EUS 1/2021: normal esophagus; 1 cm hiatal hernia; erythematous gastric mucosa; soft bulge which possibly a GIST versus leiomyoma; normal duodenum; normal pancreas and bile duct. Biopsies showed gastritis and were otherwise normal. Repeat EUS due 1 year for surveillance.  -Colonoscopy 5/4/2021: normal terminal ileum and normal colonic mucosa;  random biopsies were negative for microscopic colitis.  - EGD 11/5/2021 by Dr. Nain Gallagher. EGD report not available, however biopsy report noted normal duodenum with negative celiac sprue; and minimal focal chronic inflammation of the gastric antrum; negative H.pylori.      PAST SURGERIES:  -Laparoscopic Cholecystectomy 2009  -Total hysterectomy 4/2012  - Umbilical hernia repair x 2 and adhesions (most recent 4/2018)  - Laparoscopy for adhesions 12/2021 (Dr. Luis E Desai at [facility]).      OTHER RECENT IMAGING:  -MRCP 1/19/2020: s/p cholecystectomy; no filling defects; mild hepatosplenomegaly unchanged since 2011.  -Xray Abdomen 2/18/2021: nonobstructive bowl gas pattern; moderate to large amount of residual stool over the distal colon. Clinical correlation for constipation recommended.  - Ultrasound RUQ 8/23/2021: no focal hepatic lesion identified; s/p cholecystectomy. No biliary dilation. Possible small nonobstructing left renal calculus. No hydronephrosis bilaterally.  -CT abdomen pelvis 10/2023 with IV contrast showed normal appendix no bowel obstruction no hydro ureter nephrosis bilaterally.  -Vascular ultrasound done 10/2023 showed previous cholecystectomy, mildly elevated main hepatic artery resistive index Doppler portion exams otherwise within the limits of normal  -CT abdomen/pelvis with IV contrast 10/21/2021: no acute abdominal or pelvic process. Liver within normal limits. Bile ducts normal caliber. Pancreas within normal limits.  CT abdomen pelvis without IV contrast 9/2024 nonobstructive left-sided nephrolithiasis no hydronephrosis or signs obstructive uropathy was seen  Right upper quadrant ultrasound 12/2024 showed Ana cystectomy changes no correlation seen to explain pain      Review of systems:   Review of Systems      I performed a complete 10 point review of systems and it is negative except as noted in HPI or above.        PAST HISTORIES:       Past Medical History:  She has  a past medical history of Arthritis, Depression, Dysuria (05/08/2023), GERD (gastroesophageal reflux disease), Hyperlipidemia, unspecified (04/14/2016), Kidney stones, Migraines, NAFL (nonalcoholic fatty liver), Neuropathic pain of chest (05/08/2023), Pain of right breast (05/08/2023), Personal history of other diseases of the digestive system (07/07/2020), PONV (postoperative nausea and vomiting), Sleep apnea, TMJ (dislocation of temporomandibular joint), TMJ (temporomandibular joint disorder), Urinary urgency (05/08/2023), and Vaginal discharge (05/08/2023).    Past Surgical History:  She has a past surgical history that includes Total abdominal hysterectomy (04/16/2015); Cervical biopsy w/ loop electrode excision (04/16/2015); Cholecystectomy (04/16/2015); Hysterectomy (09/07/2021); Other surgical history (07/08/2020); Other surgical history (07/08/2020); Other surgical history (07/08/2020); Other surgical history (02/16/2021); and Other surgical history (02/16/2021).      Social History:  She reports that she has never smoked. She has never used smokeless tobacco. She reports that she does not drink alcohol and does not use drugs.    Family History:  No known GI disease, specifically denies pancreatitis, Crohn's, colon cancer, gastroesophageal cancer, or ulcerative colitis.    Family History   Problem Relation Name Age of Onset   • Diabetes Father     • Hypertension Father     • Diabetes Father's Sister     • Stomach cancer Father's Sister     • Diabetes Paternal Grandmother     • Hypertension Paternal Grandmother     • Coronary artery disease Paternal Grandmother          MI>60s   • Diabetes Paternal Cousin          Allergies:  Pregabalin, Bupropion, Gabapentin, Hydrochlorothiazide, Naltrexone-bupropion, Ondansetron hcl, Promethazine, Trazodone, and Penicillins        Objective   OBJECTIVE:       Last Recorded Vitals:  There were no vitals filed for this visit.  There were no vitals taken for this visit.      Physical Exam:    Physical Exam  Vitals reviewed.   Constitutional:       General: She is awake.      Appearance: Normal appearance.   HENT:      Head: Normocephalic.      Mouth/Throat:      Mouth: Mucous membranes are moist.   Eyes:      Extraocular Movements: Extraocular movements intact.   Cardiovascular:      Rate and Rhythm: Normal rate.      Heart sounds: Normal heart sounds.   Pulmonary:      Effort: Pulmonary effort is normal.      Breath sounds: Normal breath sounds.   Abdominal:      General: Bowel sounds are normal.      Palpations: Abdomen is soft.      Tenderness: There is no abdominal tenderness. There is no guarding or rebound.      Hernia: No hernia is present.   Musculoskeletal:         General: Normal range of motion.      Cervical back: Neck supple.   Skin:     General: Skin is warm and dry.   Neurological:      General: No focal deficit present.      Mental Status: She is alert.   Psychiatric:         Attention and Perception: Attention and perception normal.         Mood and Affect: Mood normal.         Behavior: Behavior normal.           Home Medications:  Prior to Admission medications    Medication Sig Start Date End Date Taking? Authorizing Provider   acetaminophen (Tylenol) 500 mg tablet Take 2 tablets (1,000 mg) by mouth every 6 hours if needed for mild pain (1 - 3). 12/22/23   Julia Stapleton MD   cholecalciferol (Vitamin D-3) 50 MCG (2000 UT) tablet Take 1 tablet (50 mcg) by mouth once daily.    Historical Provider, MD   cyclobenzaprine (Flexeril) 10 mg tablet Take 0.5-1 tablets (5-10 mg) by mouth 2 times a day as needed for muscle spasms. 11/18/24 12/18/24  Abiola Muhammad DO   docusate sodium (Colace) 100 mg capsule Take 2 capsules (200 mg) by mouth 2 times a day. 10/11/24   Frances Gardner MD   famotidine (Pepcid) 40 mg tablet Take 1 tablet (40 mg) by mouth once daily at bedtime.    Historical Provider, MD   ibuprofen 600 mg tablet Take 1 tablet (600 mg) by mouth every 8  hours if needed. W/ food and water    Historical Provider, MD   ketorolac (Toradol) 10 mg tablet Take 1 tablet (10 mg) by mouth every 6 hours if needed for moderate pain (4 - 6). 10/11/24   Frances Gardner MD   Lactobacillus acidophilus (PROBIOTIC ACIDOPHILUS ORAL) Probiotic Acidophilus oral capsules    Historical Provider, MD   magnesium hydroxide (Milk of Magnesia) 400 mg/5 mL suspension Shake Well and Take 30 mL by mouth once daily as needed for constipation (if no bowel movementn with colace and miralax). 10/11/24   Frances Gardner MD   meloxicam (Mobic) 15 mg tablet Take 1 tablet (15 mg) by mouth once daily. 4/17/24 4/17/25  Andrew Tyler DO   omeprazole (PriLOSEC) 40 mg DR capsule Take 1 capsule (40 mg) by mouth once daily. 7/11/22   Historical Provider, MD   ospemifene (Osphena) 60 mg tablet Take 60 mg by mouth once daily. 5/3/24 5/3/25  Johan Phillips MD   polyethylene glycol (Glycolax, Miralax) 17 gram/dose powder Dissolve 17 grams in liquid as directed and take by mouth once daily. 10/11/24   Frances Gardner MD   semaglutide, weight loss, (Wegovy) 0.25 mg/0.5 mL pen injector Inject 0.25 mg under the skin. 5/4/24      tiZANidine (Zanaflex) 4 mg tablet Take 1 tablet (4 mg) by mouth every 6 hours if needed for muscle spasms. 9/17/24 3/16/25  Johan Phillips MD   traMADol (Ultram) 50 mg tablet Take 1 tablet (50 mg) by mouth every 6 hours if needed for severe pain (7 - 10). 10/11/24   Frances Gardner MD   VITAMIN B COMPLEX ORAL Vitamin B Complex oral tablets    Historical Provider, MD         Relevant Results Recent labs reviewed in the EMR.  Lab Results   Component Value Date    HGB 14.6 12/26/2024    HGB 14.8 09/23/2024    HGB 13.9 04/08/2024    MCV 88 12/26/2024    MCV 89 09/23/2024    MCV 91 04/08/2024     (L) 12/26/2024     09/23/2024     04/08/2024       Lab Results   Component Value Date    FERRITIN 199 (H) 09/19/2023    IRON 76 09/19/2023       Lab Results   Component Value  Date     12/23/2024    K 4.2 12/23/2024     12/23/2024    BUN 10 12/23/2024    CREATININE 0.90 12/23/2024       Lab Results   Component Value Date    BILITOT 1.5 (H) 12/23/2024     Lab Results   Component Value Date    ALT 24 12/23/2024    ALT 25 06/15/2024    ALT 59 (H) 04/08/2024    AST 19 12/23/2024    AST 21 06/15/2024    AST 36 04/08/2024    ALKPHOS 76 12/23/2024    ALKPHOS 70 06/15/2024    ALKPHOS 76 04/08/2024       Lab Results   Component Value Date    CRP <0.10 02/09/2021    CRP <0.10 11/09/2019       Calprotectin, Stool   Date Value Ref Range Status   02/09/2021 19 <=49 ug/g Final     Comment:     REFERENCE INTERVAL: Calprotectin, Fecal by Immunoassay    Less than 50 ug/g.........Normal     ug/g...............Borderline elevated, test should be                              re-evaluated in 4-6 weeks.    121 ug/g or greater.......Elevated  Performed by [facility],  [address] [place] [id number] [phone]  [email or web address], Rio Childs MD, Lab. Director         Radiology: Reviewed imaging reviewed in the EMR.  US abdomen limited    Result Date: 12/29/2024  Interpreted By:  Antonio Hewitt, STUDY: US ABDOMEN LIMITED;  12/27/2024 7:33 am   INDICATION: Signs/Symptoms:pain, high LFT.   ,R10.11 Right upper quadrant pain   COMPARISON: September 24 CT   ACCESSION NUMBER(S): [id number]   ORDERING CLINICIAN: HOSEA JAMES   TECHNIQUE: Multiple images of the right upper quadrant were obtained.   FINDINGS: LIVER: The liver measures 15.1 cm no focal liver lesions     GALLBLADDER: Removed.     BILE DUCTS: No evidence of intra or extrahepatic biliary dilatation is identified; the common bile duct measures 0.2 cm. 1.1 cm rounded lesion seen in the right kidney. This is probably a cyst. It is difficult to say with certainty. Consider follow-up in 6 months.   PANCREAS: Not well seen   RIGHT KIDNEY: The right kidney measures 9.6 cm in length. The renal cortical echogenicity and thickness are  within normal limit.  No hydronephrosis or renal calculi are seen.       Cholecystectomy changes. No correlate seen to explain pain.   1 cm right renal cyst probably a cyst with proteinaceous debris. Review of prior CT did not show a similar cyst. Consider follow-up renal ultrasound in 6 months to ensure stability.   MACRO: None   Signed by: Antonio Hewitt 12/29/2024 5:37 AM Dictation workstation:   [id number]

## 2025-01-23 LAB — BACTERIA UR CULT: NORMAL

## 2025-02-03 ENCOUNTER — TELEPHONE (OUTPATIENT)
Facility: CLINIC | Age: 45
End: 2025-02-03
Payer: COMMERCIAL

## 2025-02-03 DIAGNOSIS — K52.9 CHRONIC DIARRHEA: Primary | ICD-10-CM

## 2025-02-03 RX ORDER — POLYETHYLENE GLYCOL 3350, SODIUM SULFATE ANHYDROUS, SODIUM BICARBONATE, SODIUM CHLORIDE, POTASSIUM CHLORIDE 236; 22.74; 6.74; 5.86; 2.97 G/4L; G/4L; G/4L; G/4L; G/4L
4000 POWDER, FOR SOLUTION ORAL ONCE
Qty: 4000 ML | Refills: 0 | Status: SHIPPED | OUTPATIENT
Start: 2025-02-03 | End: 2025-02-03

## 2025-02-03 NOTE — TELEPHONE ENCOUNTER
Pt scheduled for double 2/5/25.  Can you please send Sanam to  [facility] in [place]

## 2025-02-05 ENCOUNTER — ANESTHESIA EVENT (OUTPATIENT)
Dept: GASTROENTEROLOGY | Facility: HOSPITAL | Age: 45
End: 2025-02-05
Payer: COMMERCIAL

## 2025-02-05 ENCOUNTER — ANESTHESIA (OUTPATIENT)
Dept: GASTROENTEROLOGY | Facility: HOSPITAL | Age: 45
End: 2025-02-05
Payer: COMMERCIAL

## 2025-02-05 ENCOUNTER — HOSPITAL ENCOUNTER (OUTPATIENT)
Dept: GASTROENTEROLOGY | Facility: HOSPITAL | Age: 45
Discharge: HOME | End: 2025-02-05
Payer: COMMERCIAL

## 2025-02-05 VITALS
WEIGHT: 133 LBS | OXYGEN SATURATION: 100 % | SYSTOLIC BLOOD PRESSURE: 119 MMHG | HEART RATE: 71 BPM | HEIGHT: 66 IN | BODY MASS INDEX: 21.38 KG/M2 | TEMPERATURE: 97.9 F | RESPIRATION RATE: 16 BRPM | DIASTOLIC BLOOD PRESSURE: 73 MMHG

## 2025-02-05 DIAGNOSIS — R10.9 CHRONIC ABDOMINAL PAIN: Primary | ICD-10-CM

## 2025-02-05 DIAGNOSIS — K52.9 CHRONIC DIARRHEA: ICD-10-CM

## 2025-02-05 DIAGNOSIS — R10.13 EPIGASTRIC PAIN: ICD-10-CM

## 2025-02-05 DIAGNOSIS — G89.29 CHRONIC ABDOMINAL PAIN: Primary | ICD-10-CM

## 2025-02-05 DIAGNOSIS — R19.4 CHANGE IN BOWEL HABITS: ICD-10-CM

## 2025-02-05 DIAGNOSIS — R11.2 NAUSEA AND VOMITING, UNSPECIFIED VOMITING TYPE: ICD-10-CM

## 2025-02-05 PROCEDURE — 45380 COLONOSCOPY AND BIOPSY: CPT | Performed by: INTERNAL MEDICINE

## 2025-02-05 PROCEDURE — 2500000004 HC RX 250 GENERAL PHARMACY W/ HCPCS (ALT 636 FOR OP/ED): Performed by: NURSE ANESTHETIST, CERTIFIED REGISTERED

## 2025-02-05 PROCEDURE — 7100000009 HC PHASE TWO TIME - INITIAL BASE CHARGE

## 2025-02-05 PROCEDURE — 3700000002 HC GENERAL ANESTHESIA TIME - EACH INCREMENTAL 1 MINUTE

## 2025-02-05 PROCEDURE — 7100000010 HC PHASE TWO TIME - EACH INCREMENTAL 1 MINUTE

## 2025-02-05 PROCEDURE — 3700000001 HC GENERAL ANESTHESIA TIME - INITIAL BASE CHARGE

## 2025-02-05 PROCEDURE — 43239 EGD BIOPSY SINGLE/MULTIPLE: CPT | Performed by: INTERNAL MEDICINE

## 2025-02-05 RX ORDER — LIDOCAINE HYDROCHLORIDE 20 MG/ML
INJECTION, SOLUTION INFILTRATION; PERINEURAL AS NEEDED
Status: DISCONTINUED | OUTPATIENT
Start: 2025-02-05 | End: 2025-02-05

## 2025-02-05 RX ORDER — PROPOFOL 10 MG/ML
INJECTION, EMULSION INTRAVENOUS AS NEEDED
Status: DISCONTINUED | OUTPATIENT
Start: 2025-02-05 | End: 2025-02-05

## 2025-02-05 RX ORDER — SODIUM CHLORIDE 9 MG/ML
INJECTION, SOLUTION INTRAVENOUS CONTINUOUS PRN
Status: DISCONTINUED | OUTPATIENT
Start: 2025-02-05 | End: 2025-02-05

## 2025-02-05 RX ORDER — SODIUM CHLORIDE 0.9 % (FLUSH) 0.9 %
SYRINGE (ML) INJECTION AS NEEDED
Status: DISCONTINUED | OUTPATIENT
Start: 2025-02-05 | End: 2025-02-05

## 2025-02-05 RX ORDER — FENTANYL CITRATE 50 UG/ML
INJECTION, SOLUTION INTRAMUSCULAR; INTRAVENOUS AS NEEDED
Status: DISCONTINUED | OUTPATIENT
Start: 2025-02-05 | End: 2025-02-05

## 2025-02-05 RX ADMIN — LIDOCAINE HYDROCHLORIDE 2 ML: 20 INJECTION, SOLUTION INFILTRATION; PERINEURAL at 13:00

## 2025-02-05 RX ADMIN — FENTANYL CITRATE 25 MCG: 50 INJECTION INTRAMUSCULAR; INTRAVENOUS at 13:02

## 2025-02-05 RX ADMIN — PROPOFOL 50 MG: 10 INJECTION, EMULSION INTRAVENOUS at 13:02

## 2025-02-05 RX ADMIN — SODIUM CHLORIDE: 9 INJECTION, SOLUTION INTRAVENOUS at 12:52

## 2025-02-05 RX ADMIN — PROPOFOL 50 MG: 10 INJECTION, EMULSION INTRAVENOUS at 13:06

## 2025-02-05 RX ADMIN — PROPOFOL 100 MG: 10 INJECTION, EMULSION INTRAVENOUS at 13:00

## 2025-02-05 RX ADMIN — FENTANYL CITRATE 50 MCG: 50 INJECTION INTRAMUSCULAR; INTRAVENOUS at 13:00

## 2025-02-05 RX ADMIN — PROPOFOL 50 MG: 10 INJECTION, EMULSION INTRAVENOUS at 13:16

## 2025-02-05 RX ADMIN — FENTANYL CITRATE 25 MCG: 50 INJECTION INTRAMUSCULAR; INTRAVENOUS at 13:06

## 2025-02-05 RX ADMIN — PROPOFOL 50 MG: 10 INJECTION, EMULSION INTRAVENOUS at 13:10

## 2025-02-05 RX ADMIN — Medication 2 ML: at 13:00

## 2025-02-05 SDOH — HEALTH STABILITY: MENTAL HEALTH: CURRENT SMOKER: 0

## 2025-02-05 ASSESSMENT — PAIN - FUNCTIONAL ASSESSMENT
PAIN_FUNCTIONAL_ASSESSMENT: 0-10

## 2025-02-05 ASSESSMENT — PAIN SCALES - GENERAL
PAINLEVEL_OUTOF10: 0 - NO PAIN
PAINLEVEL_OUTOF10: 0 - NO PAIN
PAIN_LEVEL: 0
PAINLEVEL_OUTOF10: 0 - NO PAIN
PAINLEVEL_OUTOF10: 6

## 2025-02-05 NOTE — ANESTHESIA POSTPROCEDURE EVALUATION
Patient: Ronda Dean    Procedure Summary       Date: 02/05/25 Room / Location: [facility]    Anesthesia Start: 1252 Anesthesia Stop: 1327    Procedures:       COLONOSCOPY      EGD Diagnosis:       Change in bowel habits      Chronic diarrhea      Epigastric pain      Nausea and vomiting, unspecified vomiting type    Scheduled Providers: Giancarlo Ramirez DO Responsible Provider: MILAGRO Kaur-HAY    Anesthesia Type: MAC ASA Status: 3            Anesthesia Type: MAC    Vitals Value Taken Time   /73 02/05/25 1346   Temp 36.6 °C (97.9 °F) 02/05/25 1346   Pulse 71 02/05/25 1346   Resp 16 02/05/25 1346   SpO2 100 % 02/05/25 1346       Anesthesia Post Evaluation    Patient location during evaluation: bedside  Patient participation: complete - patient participated  Level of consciousness: awake and alert  Pain score: 0  Pain management: adequate  Airway patency: patent  Cardiovascular status: acceptable and hemodynamically stable  Respiratory status: acceptable  Hydration status: acceptable  Postoperative Nausea and Vomiting: none        There were no known notable events for this encounter.

## 2025-02-05 NOTE — ANESTHESIA PREPROCEDURE EVALUATION
Patient: Ronda Dean    Procedure Information       Date/Time: 02/05/25 1230    Scheduled providers: Giancarlo Ramirez DO    Procedures:       COLONOSCOPY      EGD    Location: [facility]            Relevant Problems   Anesthesia (within normal limits)      Cardiac   (+) Hyperlipidemia   (+) Mixed hyperlipidemia      Pulmonary (within normal limits)      Neuro   (+) Carpal tunnel syndrome on right   (+) Chronic migraine   (+) Depressive disorder   (+) Herniation of cervical intervertebral disc with radiculopathy   (+) Lumbar radiculopathy      GI   (+) Irritable bowel syndrome      /Renal (within normal limits)      Liver   (+) Nonalcoholic fatty liver disease      Endocrine (within normal limits)      Hematology (within normal limits)      Musculoskeletal   (+) Carpal tunnel syndrome on right   (+) Lumbar spondylosis   (+) Spinal stenosis of cervical region      HEENT (within normal limits)      ID (within normal limits)      Skin (within normal limits)      GYN (within normal limits)       Clinical information reviewed:   Tobacco  Allergies  Meds   Med Hx  Surg Hx  OB Status  Fam Hx  Soc   Hx        NPO Detail:  NPO/Void Status  Date of Last Liquid: 02/05/25  Time of Last Liquid: 0600  Date of Last Solid: 02/03/25  Last Intake Type: GI prep  Time of Last Void: 1142         Physical Exam    Airway  Mallampati: III  TM distance: >3 FB  Neck ROM: full     Cardiovascular - normal exam  Rhythm: regular  Rate: normal     Dental - normal exam     Pulmonary - normal exam     Abdominal - normal exam             Anesthesia Plan    History of general anesthesia?: yes  History of complications of general anesthesia?: no    ASA 3     MAC     The patient is not a current smoker.    intravenous induction   Anesthetic plan and risks discussed with patient.

## 2025-02-06 NOTE — PROGRESS NOTES
HISTORY OF PRESENT ILLNESS:  Ronda Dean is a 44 y.o. female who presents today for a follow up visit.  Reports that she is voiding better, she is having occasional leakage and still having some pain at the end of urination but overall her pain has not resolved.  She is not having significant urgency.         Past Medical History  She has a past medical history of Arthritis, Depression, Dysuria (05/08/2023), GERD (gastroesophageal reflux disease), Hyperlipidemia, unspecified (04/14/2016), Kidney stones, Migraines, NAFL (nonalcoholic fatty liver), Neuropathic pain of chest (05/08/2023), Pain of right breast (05/08/2023), Personal history of other diseases of the digestive system (07/07/2020), PONV (postoperative nausea and vomiting), Sleep apnea, TMJ (dislocation of temporomandibular joint), TMJ (temporomandibular joint disorder), Urinary urgency (05/08/2023), and Vaginal discharge (05/08/2023).    Surgical History  She has a past surgical history that includes Total abdominal hysterectomy (04/16/2015); Cervical biopsy w/ loop electrode excision (04/16/2015); Cholecystectomy (04/16/2015); Hysterectomy (09/07/2021); Other surgical history (07/08/2020); Other surgical history (07/08/2020); Other surgical history (07/08/2020); Other surgical history (02/16/2021); and Other surgical history (02/16/2021).     Social History  She reports that she has never smoked. She has never used smokeless tobacco. She reports that she does not drink alcohol and does not use drugs.    Family History  Family History   Problem Relation Name Age of Onset    Diabetes Father      Hypertension Father      Diabetes Father's Sister      Stomach cancer Father's Sister      Diabetes Paternal Grandmother      Hypertension Paternal Grandmother      Coronary artery disease Paternal Grandmother          MI>60s    Diabetes Paternal Cousin          Allergies  Pregabalin, Bupropion, Gabapentin, Hydrochlorothiazide, Naltrexone-bupropion, Ondansetron  hcl, Promethazine, Trazodone, and Penicillins      A comprehensive 10+ review of systems was negative except for: see hpi                             Assessment:  44-year-old  with dyspareunia, mixed incontinence, hypertonic pelvic floor     Stress  incontinence  -s/p sling placement 12/22/2023  -Still experiencing some stress incontinence but better than prior,  -S/P sling lysis on 10/11/24, voiding easier, having some DEBRA    -can consider bulking if persists       Urgency:  -has failed Myrbetriq  -improved after lysis     pelvic pain:    -Given uber lube  -Continue with  pelvic floor physical therapy  -Rx zanaflex   -Has improved some       GUSM:  -Has bad reactions with estrogen including vaginal estrogen  -We can try Osphena if persists     Follow up in 4 months       All questions and concerns were answered and addressed.  The patient expressed understanding and agrees with the plan.     Johan Phillips MD    Scribe Attestation  By signing my name below, I, Radha Avel, Scribe   attest that this documentation has been prepared under the direction and in the presence of Johan Phillips MD.

## 2025-02-07 ENCOUNTER — TELEMEDICINE (OUTPATIENT)
Facility: HOSPITAL | Age: 45
End: 2025-02-07
Payer: COMMERCIAL

## 2025-02-07 DIAGNOSIS — R30.0 DYSURIA: Primary | ICD-10-CM

## 2025-02-07 RX ORDER — METHENAMINE HIPPURATE 1000 MG/1
1 TABLET ORAL 2 TIMES DAILY
Qty: 180 TABLET | Refills: 3 | Status: SHIPPED | OUTPATIENT
Start: 2025-02-07 | End: 2026-02-07

## 2025-02-13 LAB
LABORATORY COMMENT REPORT: NORMAL
PATH REPORT.FINAL DX SPEC: NORMAL
PATH REPORT.GROSS SPEC: NORMAL
PATH REPORT.RELEVANT HX SPEC: NORMAL
PATH REPORT.TOTAL CANCER: NORMAL

## 2025-03-26 ENCOUNTER — OFFICE VISIT (OUTPATIENT)
Dept: URGENT CARE | Age: 45
End: 2025-03-26
Payer: COMMERCIAL

## 2025-03-26 VITALS
SYSTOLIC BLOOD PRESSURE: 137 MMHG | TEMPERATURE: 97.8 F | HEART RATE: 85 BPM | OXYGEN SATURATION: 98 % | DIASTOLIC BLOOD PRESSURE: 91 MMHG

## 2025-03-26 DIAGNOSIS — J32.9 BACTERIAL SINUSITIS: Primary | ICD-10-CM

## 2025-03-26 DIAGNOSIS — J06.9 UPPER RESPIRATORY INFECTION WITH COUGH AND CONGESTION: ICD-10-CM

## 2025-03-26 DIAGNOSIS — B96.89 BACTERIAL SINUSITIS: Primary | ICD-10-CM

## 2025-03-26 LAB
POC CORONAVIRUS SARS-COV-2 PCR: NEGATIVE
POC HUMAN RHINOVIRUS PCR: NEGATIVE
POC INFLUENZA A VIRUS PCR: NEGATIVE
POC INFLUENZA B VIRUS PCR: NEGATIVE
POC RESPIRATORY SYNCYTIAL VIRUS PCR: NEGATIVE

## 2025-03-26 PROCEDURE — 99213 OFFICE O/P EST LOW 20 MIN: CPT

## 2025-03-26 PROCEDURE — 87631 RESP VIRUS 3-5 TARGETS: CPT

## 2025-03-26 PROCEDURE — 1036F TOBACCO NON-USER: CPT

## 2025-03-26 RX ORDER — AZITHROMYCIN 250 MG/1
TABLET, FILM COATED ORAL
Qty: 6 TABLET | Refills: 0 | Status: SHIPPED | OUTPATIENT
Start: 2025-03-26 | End: 2025-03-31

## 2025-03-26 RX ORDER — BENZONATATE 100 MG/1
100 CAPSULE ORAL 3 TIMES DAILY PRN
Qty: 42 CAPSULE | Refills: 0 | Status: SHIPPED | OUTPATIENT
Start: 2025-03-26 | End: 2025-04-25

## 2025-03-26 ASSESSMENT — ENCOUNTER SYMPTOMS
COUGH: 1
SINUS PRESSURE: 1
ACTIVITY CHANGE: 1
SINUS PAIN: 1
FATIGUE: 1
EYES NEGATIVE: 1
VOICE CHANGE: 1
CARDIOVASCULAR NEGATIVE: 1

## 2025-03-26 NOTE — LETTER
March 26, 2025     Patient: Ronda Dean   YOB: 1980   Date of Visit: 3/26/2025       To Whom It May Concern:    Ronda Dean was seen in my clinic on 3/26/2025. Please excuse Ronda for her absence from work on this day to make the appointment.    If you have any questions or concerns, please don't hesitate to call.         Sincerely,         Joanna Chambers, APRN-CNP        CC: No Recipients

## 2025-03-26 NOTE — PROGRESS NOTES
Subjective   Patient ID: Ronda Dean is a 45 y.o. female. They present today with a chief complaint of Nasal Congestion.    History of Present Illness  HPI a 45-year-old female arrives to clinic with chief complaint of cough, nasal congestion, head pressure, sinus pressure and hoarseness.  Patient reports having the symptoms over the last 5 to 6 days.  She has been using DayQuil with minimal relief.  She has not use any steroids as she does not like the side effects of it.  She is here for evaluation.    Past Medical History  Allergies as of 03/26/2025 - Reviewed 03/26/2025   Allergen Reaction Noted    Pregabalin Other 02/28/2023    Bupropion Headache 02/28/2023    Gabapentin Other 02/28/2023    Hydrochlorothiazide Other 02/28/2023    Naltrexone-bupropion Headache 02/28/2023    Ondansetron hcl Other 02/28/2023    Promethazine Other 05/08/2023    Trazodone Headache 02/28/2023    Penicillins Rash 05/08/2023       (Not in a hospital admission)       Past Medical History:   Diagnosis Date    Arthritis     Depression     Dysuria 05/08/2023    GERD (gastroesophageal reflux disease)     Hyperlipidemia, unspecified 04/14/2016    Dyslipidemia    Kidney stones     Migraines     NAFL (nonalcoholic fatty liver)     Neuropathic pain of chest 05/08/2023    Pain of right breast 05/08/2023    Personal history of other diseases of the digestive system 07/07/2020    History of fatty infiltration of liver    PONV (postoperative nausea and vomiting)     Sleep apnea     TMJ (dislocation of temporomandibular joint)     TMJ (temporomandibular joint disorder)     Urinary urgency 05/08/2023    Vaginal discharge 05/08/2023       Past Surgical History:   Procedure Laterality Date    CERVICAL BIOPSY  W/ LOOP ELECTRODE EXCISION  04/16/2015    Cervical Loop Electrosurgical Excision (LEEP)    CHOLECYSTECTOMY  04/16/2015    Cholecystectomy Laparoscopic    HYSTERECTOMY  09/07/2021    Hysterectomy    OTHER SURGICAL HISTORY  07/08/2020     Exploratory laparotomy    OTHER SURGICAL HISTORY  07/08/2020    Umbilical hernia repair    OTHER SURGICAL HISTORY  07/08/2020    Tonsillectomy    OTHER SURGICAL HISTORY  02/16/2021    Esophagogastroduodenoscopy    OTHER SURGICAL HISTORY  02/16/2021    Colonoscopy    TOTAL ABDOMINAL HYSTERECTOMY  04/16/2015    Total Abdominal Hysterectomy        reports that she has never smoked. She has never used smokeless tobacco. She reports that she does not drink alcohol and does not use drugs.    Review of Systems  Review of Systems   Constitutional:  Positive for activity change and fatigue.   HENT:  Positive for congestion, sinus pressure, sinus pain and voice change.    Eyes: Negative.    Respiratory:  Positive for cough.    Cardiovascular: Negative.      Objective    Vitals:    03/26/25 0808   BP: (!) 137/91   Pulse: 85   Temp: 36.6 °C (97.8 °F)   SpO2: 98%     No LMP recorded. Patient has had a hysterectomy.    Physical Exam  Constitutional:       Appearance: She is ill-appearing.   HENT:      Head: Normocephalic and atraumatic.      Right Ear: Tympanic membrane normal.      Left Ear: Tympanic membrane normal.      Nose: Congestion present.   Cardiovascular:      Rate and Rhythm: Normal rate and regular rhythm.      Pulses: Normal pulses.      Heart sounds: Normal heart sounds.   Pulmonary:      Effort: Pulmonary effort is normal.      Breath sounds: Normal breath sounds.   Neurological:      Mental Status: She is alert.         Procedures    Point of Care Test & Imaging Results from this visit  Results for orders placed or performed in visit on 03/26/25   POCT SPOTFIRE R/ST Panel Mini w/COVID ([facility]) manually resulted    Specimen: Swab   Result Value Ref Range    POC Sars-Cov-2 PCR Negative Negative    POC Respiratory Syncytial Virus PCR Negative Negative    POC Influenza A Virus PCR Negative Negative    POC Influenza B Virus PCR Negative Negative    POC Human Rhinovirus PCR Negative Negative      No results  found.    Diagnostic study results (if any) were reviewed by LISA Gonzalez.    Assessment/Plan   Allergies, medications, history, and pertinent labs/EKGs/Imaging reviewed by LISA Gonzalez.     Medical Decision Making  Upon initial assessment, the patient was sitting calmly in the bedside chair in no acute/respiratory distress.  Physical examination does reveal the patient to have mild congestion with the change in voice and hoarseness.  There is evidence of an erythematous pharynx with normal signs and symptoms of exudates.  Bilateral ear effusion was seen as well.  Given the short duration of her symptoms, we did agree to do a spot fire test.    Spot fire results: negative     Given the results, likely bacterial sinusitis. Zpack and tessalon perles sent. Recommend xray cxr if symptoms are still present    As a result of the work-up, the patient was discharged home.  she was informed of her diagnosis and instructed to come back with any concerns or worsening of condition.  she and was agreeable to the plan as discussed above.  she was given the opportunity to ask questions.  All of the patient's questions were answered.    This document was generated using the assistance of voice recognition software. If there are any errors of spelling, grammar, syntax, or meaning; please feel free to contact me directly for clarification.     Orders and Diagnoses  Diagnoses and all orders for this visit:  Bacterial sinusitis  -     azithromycin (Zithromax) 250 mg tablet; Take 2 tabs (500 mg) by mouth today, than 1 daily for 4 days.  Upper respiratory infection with cough and congestion  -     POCT SPOTFIRE R/ST Panel Mini w/COVID ([facility]) manually resulted  -     azithromycin (Zithromax) 250 mg tablet; Take 2 tabs (500 mg) by mouth today, than 1 daily for 4 days.  -     benzonatate (Tessalon) 100 mg capsule; Take 1 capsule (100 mg) by mouth 3 times a day as needed for cough. Do not crush or  chew.      Medical Admin Record      Patient disposition: Home    Electronically signed by MILAGRO Gonzalez-EFREM  8:42 AM

## 2025-03-28 ENCOUNTER — TELEMEDICINE (OUTPATIENT)
Dept: PRIMARY CARE | Facility: CLINIC | Age: 45
End: 2025-03-28
Payer: COMMERCIAL

## 2025-03-28 DIAGNOSIS — R05.1 ACUTE COUGH: ICD-10-CM

## 2025-03-28 DIAGNOSIS — J06.9 UPPER RESPIRATORY TRACT INFECTION, UNSPECIFIED TYPE: Primary | ICD-10-CM

## 2025-03-28 PROCEDURE — 1036F TOBACCO NON-USER: CPT | Performed by: PHYSICIAN ASSISTANT

## 2025-03-28 PROCEDURE — 99213 OFFICE O/P EST LOW 20 MIN: CPT | Performed by: PHYSICIAN ASSISTANT

## 2025-03-28 ASSESSMENT — ENCOUNTER SYMPTOMS
HEMOPTYSIS: 0
FEVER: 0
RHINORRHEA: 1
HEADACHES: 0
SORE THROAT: 0
CHILLS: 0
COUGH: 1
ABDOMINAL PAIN: 0
SWEATS: 0
SHORTNESS OF BREATH: 0
MYALGIAS: 0
HEARTBURN: 0
WEIGHT LOSS: 0
WHEEZING: 0

## 2025-03-28 NOTE — PROGRESS NOTES
"Subjective   Patient ID: Ronda Dean is a 45 y.o. female who presents for URI.    Virtual or Telephone Consent    An interactive audio and video telecommunication system which permits real time communications between the patient (at the originating site) and provider (at the distant site) was utilized to provide this telehealth service.   Verbal consent was requested and obtained from Ronda Dean on this date, 03/28/25 for a telehealth visit and the patient's location was confirmed at the time of the visit.    Cough  This is a new problem. The current episode started in the past 7 days. The problem has been waxing and waning. The problem occurs constantly. The cough is Non-productive and productive of sputum. Associated symptoms include postnasal drip and rhinorrhea. Pertinent negatives include no chest pain, chills, ear congestion, ear pain, fever, headaches, heartburn, hemoptysis, myalgias, nasal congestion, rash, sore throat, shortness of breath, sweats, weight loss or wheezing. The symptoms are aggravated by lying down.      Patient c/o cough, nasal discharge. Denies fever, chills, aches, shortness of breath and sore throat.  No sinus headaches.   Present for 7 days. Has worsened since onset.   Cough: Cough is rarely productive. Gets a \"tickle in her throat\" that makes her cough.    Nasal discharge: Described as clear and mild.   Denies associated diarrhea, earache and vomiting.     Taking OTC Dayquil.   Patient denies recent known COVID exposure or international travel.   Went to urgent care 2 days ago and placed on zithromax and tessalon for sinusitis.   Had negative RSV/flu/covid and strep tests.   Symptoms haven't improved with the antibiotic.      reports that she has never smoked. She has never used smokeless tobacco.    Review of Systems   Constitutional:  Negative for chills, fever and weight loss.   HENT:  Positive for postnasal drip and rhinorrhea. Negative for ear pain and sore throat.  "   Respiratory:  Positive for cough. Negative for hemoptysis, shortness of breath and wheezing.    Cardiovascular:  Negative for chest pain.   Gastrointestinal:  Negative for abdominal pain and heartburn.   Musculoskeletal:  Negative for myalgias.   Skin:  Negative for rash.   Neurological:  Negative for headaches.     Past Medical History:   Diagnosis Date    Arthritis     Depression     Dysuria 05/08/2023    GERD (gastroesophageal reflux disease)     Hyperlipidemia, unspecified 04/14/2016    Dyslipidemia    Kidney stones     Migraines     NAFL (nonalcoholic fatty liver)     Neuropathic pain of chest 05/08/2023    Pain of right breast 05/08/2023    Personal history of other diseases of the digestive system 07/07/2020    History of fatty infiltration of liver    PONV (postoperative nausea and vomiting)     Sleep apnea     TMJ (dislocation of temporomandibular joint)     TMJ (temporomandibular joint disorder)     Urinary urgency 05/08/2023    Vaginal discharge 05/08/2023      Family History   Problem Relation Name Age of Onset    Diabetes Father      Hypertension Father      Diabetes Father's Sister      Stomach cancer Father's Sister      Diabetes Paternal Grandmother      Hypertension Paternal Grandmother      Coronary artery disease Paternal Grandmother          MI>60s    Diabetes Paternal Cousin        Social History     Tobacco Use    Smoking status: Never    Smokeless tobacco: Never   Vaping Use    Vaping status: Never Used   Substance Use Topics    Alcohol use: Never    Drug use: Never        Objective   There were no vitals taken for this visit.    Physical Exam  Constitutional:       General: She is not in acute distress.     Appearance: Normal appearance. She is not toxic-appearing.   HENT:      Head: Normocephalic.   Eyes:      General: No scleral icterus.  Pulmonary:      Effort: Pulmonary effort is normal. No respiratory distress.   Neurological:      Mental Status: She is alert.   Psychiatric:          Mood and Affect: Mood normal.         Assessment/Plan   Diagnoses and all orders for this visit:  Upper respiratory tract infection, unspecified type  Acute cough       Discussed probable viral nature of illness, which is likely why antibiotic isn't helping.   Can use Mucinex DM 1200mg bid.   Continue Tessalon perles.   Can add OTC Flonase nasal spray.   Encourage fluids and rest.   Follow up if symptoms increase or persist.       Duration of video visit: 9m 59s

## 2025-05-06 ENCOUNTER — OFFICE VISIT (OUTPATIENT)
Dept: PRIMARY CARE | Facility: CLINIC | Age: 45
End: 2025-05-06
Payer: COMMERCIAL

## 2025-05-06 ENCOUNTER — HOSPITAL ENCOUNTER (OUTPATIENT)
Dept: RADIOLOGY | Facility: HOSPITAL | Age: 45
Discharge: HOME | End: 2025-05-06
Payer: COMMERCIAL

## 2025-05-06 VITALS
HEART RATE: 81 BPM | TEMPERATURE: 96.5 F | DIASTOLIC BLOOD PRESSURE: 82 MMHG | WEIGHT: 134 LBS | OXYGEN SATURATION: 99 % | BODY MASS INDEX: 21.63 KG/M2 | SYSTOLIC BLOOD PRESSURE: 124 MMHG

## 2025-05-06 DIAGNOSIS — N28.1 RENAL CYST: ICD-10-CM

## 2025-05-06 DIAGNOSIS — R10.9 FLANK PAIN: ICD-10-CM

## 2025-05-06 DIAGNOSIS — R82.90 ABNORMAL URINE ODOR: Primary | ICD-10-CM

## 2025-05-06 LAB
POC APPEARANCE, URINE: CLEAR
POC BILIRUBIN, URINE: NEGATIVE
POC BLOOD, URINE: NEGATIVE
POC COLOR, URINE: YELLOW
POC GLUCOSE, URINE: NEGATIVE MG/DL
POC KETONES, URINE: NEGATIVE MG/DL
POC LEUKOCYTES, URINE: NEGATIVE
POC NITRITE,URINE: NEGATIVE
POC PH, URINE: 7 PH
POC PROTEIN, URINE: ABNORMAL MG/DL
POC SPECIFIC GRAVITY, URINE: 1.02
POC UROBILINOGEN, URINE: 0.2 EU/DL

## 2025-05-06 PROCEDURE — 76770 US EXAM ABDO BACK WALL COMP: CPT | Performed by: RADIOLOGY

## 2025-05-06 PROCEDURE — 81003 URINALYSIS AUTO W/O SCOPE: CPT | Performed by: FAMILY MEDICINE

## 2025-05-06 PROCEDURE — 76770 US EXAM ABDO BACK WALL COMP: CPT

## 2025-05-06 PROCEDURE — 1036F TOBACCO NON-USER: CPT | Performed by: FAMILY MEDICINE

## 2025-05-06 PROCEDURE — 99214 OFFICE O/P EST MOD 30 MIN: CPT | Performed by: FAMILY MEDICINE

## 2025-05-06 ASSESSMENT — ENCOUNTER SYMPTOMS
VOMITING: 0
ARTHRALGIAS: 0
SHORTNESS OF BREATH: 0
DIARRHEA: 0
POLYPHAGIA: 0
POLYDIPSIA: 0
DYSURIA: 0
NAUSEA: 0
HEADACHES: 0
DIFFICULTY URINATING: 0
DYSPHORIC MOOD: 0
SLEEP DISTURBANCE: 0
CONSTIPATION: 0
DIZZINESS: 0
PALPITATIONS: 0
HEMATURIA: 0
ABDOMINAL PAIN: 0
FATIGUE: 0
MYALGIAS: 0

## 2025-05-06 NOTE — PATIENT INSTRUCTIONS
Increase fluids.    Go to the ER if any of your symptoms are significantly worsening.     Repeat US kidneys    Return as scheduled, sooner if needed

## 2025-05-06 NOTE — PROGRESS NOTES
Subjective   Patient ID: Ronda Dean is a 45 y.o. female who presents for Back Pain (L side lower back pain x 1 week  [facility]) and multiple issues    HPI   Left flank pain: Onset x 1 week.  Concerned about her kidneys.  No dysuria or hematuria.  No injury.  No radiation of pain.  Worse with some movement but occurs randomly throughout the day.  No associated fever/chills this nausea/vomiting.  Has been referred to pelvic floor PT but patient has not followed up    Renal cyst: Due for recheck.  Seen incidentally on ultrasound on right kidney.  Due for recheck next month    Review of Systems   Constitutional:  Negative for fatigue.   Eyes:  Negative for visual disturbance.   Respiratory:  Negative for shortness of breath.    Cardiovascular:  Negative for chest pain and palpitations.   Gastrointestinal:  Negative for abdominal pain, constipation, diarrhea, nausea and vomiting.   Endocrine: Negative for polydipsia, polyphagia and polyuria.   Genitourinary:  Negative for difficulty urinating, dysuria and hematuria.   Musculoskeletal:  Negative for arthralgias and myalgias.   Skin:  Negative for rash.   Neurological:  Negative for dizziness and headaches.   Psychiatric/Behavioral:  Negative for dysphoric mood and sleep disturbance.        Objective   /82   Pulse 81   Temp 35.8 °C (96.5 °F)   Wt 60.8 kg (134 lb)   SpO2 99%   BMI 21.63 kg/m²     Physical Exam  Vitals and nursing note reviewed.   Constitutional:       General: She is not in acute distress.     Appearance: Normal appearance. She is not toxic-appearing.   HENT:      Head: Normocephalic.   Eyes:      General: No scleral icterus.     Pupils: Pupils are equal, round, and reactive to light.   Cardiovascular:      Rate and Rhythm: Normal rate and regular rhythm.      Heart sounds: No murmur heard.  Pulmonary:      Effort: Pulmonary effort is normal. No respiratory distress.      Breath sounds: Normal breath sounds.   Abdominal:      Palpations: Abdomen is  soft.      Tenderness: There is no abdominal tenderness. There is left CVA tenderness. There is no right CVA tenderness or guarding.   Musculoskeletal:         General: No tenderness.      Cervical back: Neck supple.      Right lower leg: No edema.      Left lower leg: No edema.   Lymphadenopathy:      Cervical: No cervical adenopathy.   Skin:     General: Skin is warm.   Neurological:      General: No focal deficit present.      Mental Status: She is alert.      Cranial Nerves: No cranial nerve deficit.   Psychiatric:         Mood and Affect: Mood normal.         Assessment/Plan   Problem List Items Addressed This Visit    None  Visit Diagnoses         Codes      Abnormal urine odor    -  Primary R82.90    Relevant Orders    POCT UA Automated manually resulted (Completed)      Renal cyst     N28.1    Relevant Orders    US renal complete      Flank pain     R10.9    Relevant Orders    US renal complete        Reviewed ultrasound with patient.  Reviewed prior blood work including CMP and lipids.  Recommendations given.  Recommend she does follow-up with her urologist if symptoms persistent

## 2025-05-08 ENCOUNTER — TELEPHONE (OUTPATIENT)
Dept: PRIMARY CARE | Facility: CLINIC | Age: 45
End: 2025-05-08
Payer: COMMERCIAL

## 2025-05-08 NOTE — TELEPHONE ENCOUNTER
----- Message from Abiola Muhammad sent at 5/8/2025 10:15 AM EDT -----  Patient that her ultrasound kidneys were both normal.  They did not see any lesions.  Follow-up if symptoms persistent  ----- Message -----  From: Interface, Radiology Results In  Sent: 5/7/2025   6:06 PM EDT  To: Abiola Muhammad, DO

## 2025-06-19 ENCOUNTER — APPOINTMENT (OUTPATIENT)
Dept: RADIOLOGY | Facility: HOSPITAL | Age: 45
End: 2025-06-19
Payer: COMMERCIAL

## 2025-06-19 ENCOUNTER — HOSPITAL ENCOUNTER (EMERGENCY)
Facility: HOSPITAL | Age: 45
Discharge: HOME | End: 2025-06-19
Payer: COMMERCIAL

## 2025-06-19 VITALS
OXYGEN SATURATION: 100 % | SYSTOLIC BLOOD PRESSURE: 119 MMHG | HEIGHT: 66 IN | TEMPERATURE: 98.2 F | BODY MASS INDEX: 20.89 KG/M2 | WEIGHT: 130 LBS | HEART RATE: 69 BPM | RESPIRATION RATE: 16 BRPM | DIASTOLIC BLOOD PRESSURE: 79 MMHG

## 2025-06-19 DIAGNOSIS — N39.0 URINARY TRACT INFECTION WITHOUT HEMATURIA, SITE UNSPECIFIED: ICD-10-CM

## 2025-06-19 DIAGNOSIS — R10.9 ABDOMINAL PAIN, UNSPECIFIED ABDOMINAL LOCATION: Primary | ICD-10-CM

## 2025-06-19 LAB
ALBUMIN SERPL BCP-MCNC: 4.6 G/DL (ref 3.4–5)
ALP SERPL-CCNC: 70 U/L (ref 33–110)
ALT SERPL W P-5'-P-CCNC: 34 U/L (ref 7–45)
ANION GAP SERPL CALC-SCNC: 8 MMOL/L (ref 10–20)
APPEARANCE UR: CLEAR
AST SERPL W P-5'-P-CCNC: 22 U/L (ref 9–39)
BASOPHILS # BLD AUTO: 0.05 X10*3/UL (ref 0–0.1)
BASOPHILS NFR BLD AUTO: 0.7 %
BILIRUB DIRECT SERPL-MCNC: 0.2 MG/DL (ref 0–0.3)
BILIRUB SERPL-MCNC: 1.5 MG/DL (ref 0–1.2)
BILIRUB UR STRIP.AUTO-MCNC: NEGATIVE MG/DL
BUN SERPL-MCNC: 13 MG/DL (ref 6–23)
CALCIUM SERPL-MCNC: 9.6 MG/DL (ref 8.6–10.3)
CHLORIDE SERPL-SCNC: 108 MMOL/L (ref 98–107)
CO2 SERPL-SCNC: 26 MMOL/L (ref 21–32)
COLOR UR: ABNORMAL
CREAT SERPL-MCNC: 0.9 MG/DL (ref 0.5–1.05)
EGFRCR SERPLBLD CKD-EPI 2021: 82 ML/MIN/1.73M*2
EOSINOPHIL # BLD AUTO: 0.1 X10*3/UL (ref 0–0.7)
EOSINOPHIL NFR BLD AUTO: 1.3 %
ERYTHROCYTE [DISTWIDTH] IN BLOOD BY AUTOMATED COUNT: 12.4 % (ref 11.5–14.5)
GLUCOSE SERPL-MCNC: 77 MG/DL (ref 74–99)
GLUCOSE UR STRIP.AUTO-MCNC: NORMAL MG/DL
HCT VFR BLD AUTO: 40.8 % (ref 36–46)
HGB BLD-MCNC: 13.9 G/DL (ref 12–16)
IMM GRANULOCYTES # BLD AUTO: 0.01 X10*3/UL (ref 0–0.7)
IMM GRANULOCYTES NFR BLD AUTO: 0.1 % (ref 0–0.9)
KETONES UR STRIP.AUTO-MCNC: NEGATIVE MG/DL
LACTATE SERPL-SCNC: 0.5 MMOL/L (ref 0.4–2)
LEUKOCYTE ESTERASE UR QL STRIP.AUTO: ABNORMAL
LIPASE SERPL-CCNC: 45 U/L (ref 9–82)
LYMPHOCYTES # BLD AUTO: 3.65 X10*3/UL (ref 1.2–4.8)
LYMPHOCYTES NFR BLD AUTO: 49 %
MCH RBC QN AUTO: 29.2 PG (ref 26–34)
MCHC RBC AUTO-ENTMCNC: 34.1 G/DL (ref 32–36)
MCV RBC AUTO: 86 FL (ref 80–100)
MONOCYTES # BLD AUTO: 0.51 X10*3/UL (ref 0.1–1)
MONOCYTES NFR BLD AUTO: 6.8 %
MUCOUS THREADS #/AREA URNS AUTO: ABNORMAL /LPF
NEUTROPHILS # BLD AUTO: 3.13 X10*3/UL (ref 1.2–7.7)
NEUTROPHILS NFR BLD AUTO: 42.1 %
NITRITE UR QL STRIP.AUTO: NEGATIVE
NRBC BLD-RTO: 0 /100 WBCS (ref 0–0)
PH UR STRIP.AUTO: 6 [PH]
PLATELET # BLD AUTO: 158 X10*3/UL (ref 150–450)
POTASSIUM SERPL-SCNC: 3.7 MMOL/L (ref 3.5–5.3)
PROT SERPL-MCNC: 6.7 G/DL (ref 6.4–8.2)
PROT UR STRIP.AUTO-MCNC: NEGATIVE MG/DL
RBC # BLD AUTO: 4.76 X10*6/UL (ref 4–5.2)
RBC # UR STRIP.AUTO: NEGATIVE MG/DL
RBC #/AREA URNS AUTO: ABNORMAL /HPF
SODIUM SERPL-SCNC: 138 MMOL/L (ref 136–145)
SP GR UR STRIP.AUTO: 1.02
UROBILINOGEN UR STRIP.AUTO-MCNC: NORMAL MG/DL
WBC # BLD AUTO: 7.5 X10*3/UL (ref 4.4–11.3)
WBC #/AREA URNS AUTO: ABNORMAL /HPF

## 2025-06-19 PROCEDURE — 74177 CT ABD & PELVIS W/CONTRAST: CPT

## 2025-06-19 PROCEDURE — 82248 BILIRUBIN DIRECT: CPT | Performed by: NURSE PRACTITIONER

## 2025-06-19 PROCEDURE — 83690 ASSAY OF LIPASE: CPT | Performed by: NURSE PRACTITIONER

## 2025-06-19 PROCEDURE — 85025 COMPLETE CBC W/AUTO DIFF WBC: CPT | Performed by: NURSE PRACTITIONER

## 2025-06-19 PROCEDURE — 83605 ASSAY OF LACTIC ACID: CPT | Performed by: NURSE PRACTITIONER

## 2025-06-19 PROCEDURE — 2500000001 HC RX 250 WO HCPCS SELF ADMINISTERED DRUGS (ALT 637 FOR MEDICARE OP): Performed by: NURSE PRACTITIONER

## 2025-06-19 PROCEDURE — 74177 CT ABD & PELVIS W/CONTRAST: CPT | Performed by: RADIOLOGY

## 2025-06-19 PROCEDURE — 2550000001 HC RX 255 CONTRASTS: Performed by: NURSE PRACTITIONER

## 2025-06-19 PROCEDURE — 87086 URINE CULTURE/COLONY COUNT: CPT | Mod: PORLAB | Performed by: NURSE PRACTITIONER

## 2025-06-19 PROCEDURE — 36415 COLL VENOUS BLD VENIPUNCTURE: CPT | Performed by: NURSE PRACTITIONER

## 2025-06-19 PROCEDURE — 99285 EMERGENCY DEPT VISIT HI MDM: CPT | Mod: 25

## 2025-06-19 PROCEDURE — 96374 THER/PROPH/DIAG INJ IV PUSH: CPT | Mod: 59

## 2025-06-19 PROCEDURE — 2500000004 HC RX 250 GENERAL PHARMACY W/ HCPCS (ALT 636 FOR OP/ED): Performed by: NURSE PRACTITIONER

## 2025-06-19 PROCEDURE — 81001 URINALYSIS AUTO W/SCOPE: CPT | Performed by: NURSE PRACTITIONER

## 2025-06-19 RX ORDER — CEPHALEXIN 250 MG/1
500 CAPSULE ORAL ONCE
Status: COMPLETED | OUTPATIENT
Start: 2025-06-19 | End: 2025-06-19

## 2025-06-19 RX ORDER — CEPHALEXIN 500 MG/1
500 CAPSULE ORAL 4 TIMES DAILY
Qty: 28 CAPSULE | Refills: 0 | Status: SHIPPED | OUTPATIENT
Start: 2025-06-19 | End: 2025-06-26

## 2025-06-19 RX ORDER — KETOROLAC TROMETHAMINE 30 MG/ML
15 INJECTION, SOLUTION INTRAMUSCULAR; INTRAVENOUS ONCE
Status: COMPLETED | OUTPATIENT
Start: 2025-06-19 | End: 2025-06-19

## 2025-06-19 RX ADMIN — KETOROLAC TROMETHAMINE 15 MG: 30 INJECTION, SOLUTION INTRAMUSCULAR at 20:32

## 2025-06-19 RX ADMIN — IOHEXOL 75 ML: 350 INJECTION, SOLUTION INTRAVENOUS at 21:03

## 2025-06-19 RX ADMIN — CEPHALEXIN 500 MG: 250 CAPSULE ORAL at 22:16

## 2025-06-19 ASSESSMENT — PAIN SCALES - GENERAL: PAINLEVEL_OUTOF10: 9

## 2025-06-19 ASSESSMENT — LIFESTYLE VARIABLES
EVER FELT BAD OR GUILTY ABOUT YOUR DRINKING: NO
EVER HAD A DRINK FIRST THING IN THE MORNING TO STEADY YOUR NERVES TO GET RID OF A HANGOVER: NO
TOTAL SCORE: 0
HAVE YOU EVER FELT YOU SHOULD CUT DOWN ON YOUR DRINKING: NO
HAVE PEOPLE ANNOYED YOU BY CRITICIZING YOUR DRINKING: NO

## 2025-06-19 ASSESSMENT — PAIN DESCRIPTION - LOCATION: LOCATION: ABDOMEN

## 2025-06-19 ASSESSMENT — PAIN - FUNCTIONAL ASSESSMENT: PAIN_FUNCTIONAL_ASSESSMENT: 0-10

## 2025-06-19 ASSESSMENT — PAIN DESCRIPTION - ORIENTATION: ORIENTATION: RIGHT;UPPER

## 2025-06-19 ASSESSMENT — PAIN DESCRIPTION - FREQUENCY: FREQUENCY: CONSTANT/CONTINUOUS

## 2025-06-19 ASSESSMENT — PAIN DESCRIPTION - PAIN TYPE: TYPE: ACUTE PAIN

## 2025-06-19 ASSESSMENT — PAIN DESCRIPTION - DESCRIPTORS: DESCRIPTORS: SHARP;STABBING

## 2025-06-19 NOTE — ED TRIAGE NOTES
Pt. to ED c/o RUQ abdominal pain for 3-4 days. Worse today, hurts when walking. Pt. describes pain as sharp/stabbing and occasionally radiates to her back.  Pt. stated that she has issues with fatty liver/cholesterol.

## 2025-06-19 NOTE — Clinical Note
Ronda Dean was seen and treated in our emergency department on 6/19/2025.  She may return to work on 06/20/2025.       If you have any questions or concerns, please don't hesitate to call.      Tarah Kerr, APRN-CNP

## 2025-06-20 ENCOUNTER — APPOINTMENT (OUTPATIENT)
Dept: PRIMARY CARE | Facility: CLINIC | Age: 45
End: 2025-06-20
Payer: COMMERCIAL

## 2025-06-20 LAB — HOLD SPECIMEN: 293

## 2025-06-20 NOTE — ED PROVIDER NOTES
HPI   Chief Complaint   Patient presents with    Abdominal Pain       45-year-old female with past history of Hyperlipidemia, fatty liver disease, recurrent urinary tract infections presents to the emergency department today for right lower quadrant pain.  She has a surgical history of cholecystectomy, total hysterectomy, 3 umbilical hernia repairs, bladder sling.  Patient states over the past 3 to 4 days she has been having a stabbing sharp discomfort in the right lower and right upper quadrants.  It is started to radiate somewhat to her back.  She does have nausea but no vomiting.  No changes in her bowel or bladder habits.  Denies any dysuria, urinary frequency or hesitancy.  No fever or chills.  Patient has any chest pain, shortness of breath, dizziness headache or syncope.                          [place] Coma Scale Score: 15                  Patient History   Medical History[1]  Surgical History[2]  Family History[3]  Social History[4]    Physical Exam   ED Triage Vitals [06/19/25 1946]   Temperature Heart Rate Respirations BP   36.8 °C (98.2 °F) 76 18 134/86      Pulse Ox Temp Source Heart Rate Source Patient Position   97 % Temporal Monitor --      BP Location FiO2 (%)     -- --       Physical Exam  Vitals and nursing note reviewed.   Constitutional:       General: She is not in acute distress.     Appearance: Normal appearance. She is not toxic-appearing.   HENT:      Right Ear: Tympanic membrane normal.      Left Ear: Tympanic membrane normal.      Mouth/Throat:      Mouth: Mucous membranes are moist.      Pharynx: Oropharynx is clear.   Eyes:      Extraocular Movements: Extraocular movements intact.      Pupils: Pupils are equal, round, and reactive to light.   Cardiovascular:      Rate and Rhythm: Normal rate and regular rhythm.      Pulses: Normal pulses.      Heart sounds: Normal heart sounds.   Pulmonary:      Effort: Pulmonary effort is normal.      Breath sounds: Normal breath sounds.   Abdominal:       General: Abdomen is flat. Bowel sounds are normal.      Palpations: Abdomen is soft.      Tenderness: There is abdominal tenderness in the right lower quadrant. There is no right CVA tenderness, left CVA tenderness, guarding or rebound. Positive signs include McBurney's sign.   Musculoskeletal:         General: Normal range of motion.      Cervical back: Normal range of motion and neck supple.   Skin:     General: Skin is warm and dry.      Capillary Refill: Capillary refill takes less than 2 seconds.   Neurological:      General: No focal deficit present.      Mental Status: She is alert and oriented to person, place, and time.   Psychiatric:         Mood and Affect: Mood normal.         Behavior: Behavior normal.         Judgment: Judgment normal.         Labs Reviewed   BASIC METABOLIC PANEL - Abnormal       Result Value    Glucose 77      Sodium 138      Potassium 3.7      Chloride 108 (*)     Bicarbonate 26      Anion Gap 8 (*)     Urea Nitrogen 13      Creatinine 0.90      eGFR 82      Calcium 9.6     HEPATIC FUNCTION PANEL - Abnormal    Albumin 4.6      Bilirubin, Total 1.5 (*)     Bilirubin, Direct 0.2      Alkaline Phosphatase 70      ALT 34      AST 22      Total Protein 6.7     URINALYSIS WITH REFLEX CULTURE AND MICROSCOPIC - Abnormal    Color, Urine Light-Yellow      Appearance, Urine Clear      Specific Gravity, Urine 1.017      pH, Urine 6.0      Protein, Urine NEGATIVE      Glucose, Urine Normal      Blood, Urine NEGATIVE      Ketones, Urine NEGATIVE      Bilirubin, Urine NEGATIVE      Urobilinogen, Urine Normal      Nitrite, Urine NEGATIVE      Leukocyte Esterase, Urine 250 Marian/uL (*)    MICROSCOPIC ONLY, URINE - Abnormal    WBC, Urine 6-10 (*)     RBC, Urine 1-2      Mucus, Urine FEW     LACTATE - Normal    Lactate 0.5      Narrative:     Venipuncture immediately after or during the administration of Metamizole may lead to falsely low results. Testing should be performed immediately prior to  Metamizole dosing.   LIPASE - Normal    Lipase 45      Narrative:     Venipuncture immediately after or during the administration of Metamizole may lead to falsely low results. Testing should be performed immediately prior to Metamizole dosing.   URINE CULTURE   CBC WITH AUTO DIFFERENTIAL    WBC 7.5      nRBC 0.0      RBC 4.76      Hemoglobin 13.9      Hematocrit 40.8      MCV 86      MCH 29.2      MCHC 34.1      RDW 12.4      Platelets 158      Neutrophils % 42.1      Immature Granulocytes %, Automated 0.1      Lymphocytes % 49.0      Monocytes % 6.8      Eosinophils % 1.3      Basophils % 0.7      Neutrophils Absolute 3.13      Immature Granulocytes Absolute, Automated 0.01      Lymphocytes Absolute 3.65      Monocytes Absolute 0.51      Eosinophils Absolute 0.10      Basophils Absolute 0.05     URINALYSIS WITH REFLEX CULTURE AND MICROSCOPIC    Narrative:     The following orders were created for panel order Urinalysis with Reflex Culture and Microscopic.  Procedure                               Abnormality         Status                     ---------                               -----------         ------                     Urinalysis with Reflex C...[[id number]]  Abnormal            Final result               Extra Urine Gray Tube[[id number]]                            In process                   Please view results for these tests on the individual orders.   EXTRA URINE GRAY TUBE     Pain Management Panel           No data to display              CT abdomen pelvis w IV contrast   Final Result   No acute abdominal or pelvic process.                  MACRO:   None        Signed by: Miguelina Wall 6/19/2025 9:58 PM   Dictation workstation:   [id number]          ED Course & MDM   Diagnoses as of 06/19/25 2211   Abdominal pain, unspecified abdominal location   Urinary tract infection without hematuria, site unspecified       Medical Decision Making  On initial evaluation patient is well-appearing in the emergency  department at this time.  She does have some right upper and right flank and right lower quadrant discomfort.  No CVA tenderness.  Was given Toradol in the ED with some improvement in the pain.  Bili 1.5 hepatic functions within normal limits otherwise.  Lipase is negative.  BMP, lactic within normal limits.  Urinalysis shows positive leukocytes and white blood cells no nitrites.  CBC shows no leukocytosis or signs of anemia CT abdomen pelvis shows no acute abdominal or pelvic process which was evaluated by myself and radiology.  Does appear to have some stool in the RLQ.  As the patient is having some discomfort urination as well as leukocytes and white cells a culture is pending at this time we will start her on Keflex with her first dose in the ED and have her follow-up outpatient.  She has an appointment with her primary care provider next week.  Educated patient on return precautions close outpatient follow-up.  Reevaluation no rebound or guarding noted on abdominal exam remains soft she will be discharged home in stable condition.        Procedure  Procedures      Differential Diagnoses include appendicitis, cholelithiasis, kidney stone, uti, pyelo  This is not an exhaustive list of all the diagnosis and therapeutics are considered during the patient's evaluation for an emergency medical condition.    I discussed the differential, results and discharge plan with the patient and/or family/friend/caregiver if present.  I emphasized the importance of follow-up with the physician I referred them to in the timeframe recommended.  I explained reasons for the patient to return to the Emergency Department. Additional verbal discharge instructions were also given and discussed with the patient to supplement those generated by the EMR. We also discussed medications that were prescribed (if any) including common side effects and interactions. The patient was advised to abstain from driving, operating heavy machinery or  making significant decisions while taking medications such as opiates and muscle relaxers that may impair this. All questions were addressed.  They understand return precautions and discharge instructions. The patient and/or family/friend/caregiver expressed understanding.             [1]   Past Medical History:  Diagnosis Date    Arthritis     Depression     Dysuria 05/08/2023    GERD (gastroesophageal reflux disease)     Hyperlipidemia, unspecified 04/14/2016    Dyslipidemia    Kidney stones     Migraines     NAFL (nonalcoholic fatty liver)     Neuropathic pain of chest 05/08/2023    Pain of right breast 05/08/2023    Personal history of other diseases of the digestive system 07/07/2020    History of fatty infiltration of liver    PONV (postoperative nausea and vomiting)     Sleep apnea     TMJ (dislocation of temporomandibular joint)     TMJ (temporomandibular joint disorder)     Urinary urgency 05/08/2023    Vaginal discharge 05/08/2023   [2]   Past Surgical History:  Procedure Laterality Date    CERVICAL BIOPSY  W/ LOOP ELECTRODE EXCISION  04/16/2015    Cervical Loop Electrosurgical Excision (LEEP)    CHOLECYSTECTOMY  04/16/2015    Cholecystectomy Laparoscopic    HYSTERECTOMY  09/07/2021    Hysterectomy    OTHER SURGICAL HISTORY  07/08/2020    Exploratory laparotomy    OTHER SURGICAL HISTORY  07/08/2020    Umbilical hernia repair    OTHER SURGICAL HISTORY  07/08/2020    Tonsillectomy    OTHER SURGICAL HISTORY  02/16/2021    Esophagogastroduodenoscopy    OTHER SURGICAL HISTORY  02/16/2021    Colonoscopy    TOTAL ABDOMINAL HYSTERECTOMY  04/16/2015    Total Abdominal Hysterectomy   [3]   Family History  Problem Relation Name Age of Onset    Diabetes Father      Hypertension Father      Diabetes Father's Sister      Stomach cancer Father's Sister      Diabetes Paternal Grandmother      Hypertension Paternal Grandmother      Coronary artery disease Paternal Grandmother          MI>60s    Diabetes Paternal Cousin      [4]   Social History  Tobacco Use    Smoking status: Never    Smokeless tobacco: Never   Vaping Use    Vaping status: Never Used   Substance Use Topics    Alcohol use: Never    Drug use: Never        Tarah Kerr, MILAGRO-CNP  06/19/25 [id number]

## 2025-06-21 LAB — BACTERIA UR CULT: NORMAL

## 2025-06-24 ENCOUNTER — OFFICE VISIT (OUTPATIENT)
Facility: CLINIC | Age: 45
End: 2025-06-24
Payer: COMMERCIAL

## 2025-06-24 VITALS
DIASTOLIC BLOOD PRESSURE: 74 MMHG | OXYGEN SATURATION: 99 % | SYSTOLIC BLOOD PRESSURE: 114 MMHG | BODY MASS INDEX: 21.21 KG/M2 | WEIGHT: 132 LBS | HEIGHT: 66 IN | HEART RATE: 73 BPM

## 2025-06-24 DIAGNOSIS — R11.2 NAUSEA AND VOMITING, UNSPECIFIED VOMITING TYPE: ICD-10-CM

## 2025-06-24 DIAGNOSIS — R10.13 EPIGASTRIC PAIN: Primary | ICD-10-CM

## 2025-06-24 PROCEDURE — 99214 OFFICE O/P EST MOD 30 MIN: CPT | Performed by: INTERNAL MEDICINE

## 2025-06-24 PROCEDURE — 3008F BODY MASS INDEX DOCD: CPT | Performed by: INTERNAL MEDICINE

## 2025-06-24 RX ORDER — FAMOTIDINE 40 MG/1
40 TABLET, FILM COATED ORAL NIGHTLY
Qty: 30 TABLET | Refills: 3 | Status: SHIPPED | OUTPATIENT
Start: 2025-06-24

## 2025-06-24 RX ORDER — OMEPRAZOLE 40 MG/1
40 CAPSULE, DELAYED RELEASE ORAL
Qty: 90 CAPSULE | Refills: 0 | Status: SHIPPED | OUTPATIENT
Start: 2025-06-24

## 2025-06-24 NOTE — PROGRESS NOTES
[facility] Gastroenterology    ASSESSMENT and PLAN:       Ronda Dean is a 44 y.o. female with a significant past medical history of obstructive sleep apnea, mixed hyperlipidemia, bipolar, migraines, pelvic floor dysfunction history of hysterectomy, chronic right upper quadrant abdominal pain IBS who presents for consultation requested by her primary care provider (Abiola Muhammad DO) for the evaluation of right upper quadrant fullness.          1, Epigastric Pain  -  functional dyspepsia vs possible sphincter of Oddi dysfunction  - Risk and benefits of EGD including bleeding perforation and infection were discussed with patient and they wish to proceed   -History of subepithelial gastric mass repeat EUS in 2023 did not show any mass was thought to be possibly due to inflammation original EUS repeat EGD now for evaluation  - EGD was negative for acute pathology biopsies negative for H. Pylori  - Patient obtain gastric emptying study as was ordered after EGD      2. Rectal Bleeding/change in bowel habits/diarrhea  -Colonoscopy in 2021 antibodies were negative patient admits to increased amount of diarrhea and rectal bleeding  - Colonoscopy was negative random biopsies negative for Colitis  - Recommend high-fiber diet will trial FD guard      Patient to follow-up in 3 to 4 months  Giancarlo Ramirez DO         Gastroenterology    [facility] [place]            Subjective   HISTORY OF PRESENT ILLNESS:     Chief Complaint  ER Follow-up (ER on 6/19/25 and 6/21/25 for rectal bleeding.  /EGD/colon 2/5/25)    History Of Present Illness:    Ronda Dean is a 45 y.o. female with a significant past medical history of obstructive sleep apnea, mixed hyperlipidemia, bipolar, migraines, pelvic floor dysfunction history of hysterectomy, chronic right upper quadrant abdominal pain IBS who presents for consultation requested by her primary care provider (Abiola Muhammad DO)  for the evaluation of right upper quadrant fullness.         Patient was last seen by Dr. Lauren in October 2023.  Also this patient has chronic right upper quadrant pain etiology unclear was referred to hepatology.  Moved from September 2023 this shows that she has right upper quadrant pain intermittent unrelated to meals and it hurts to bend over and she is tired all the time.    Patient has also follows with Ara Wheeler at that time patient extensive workup and no clear diagnosis for abdominal pain-found.    Patient has been on Wevoy since March     The patient has a very long history of right upper quadrant abdominal pain. She has been seen by a multitude of gastroenterologists in different healthcare systems. She has undergone extensive workup in the past which has not been overly revealing. She has also undergone a multitude of endoscopic procedures by a variety of different gastroenterologists which have not been overly revealing. She did have her gallbladder removed about 20 years ago. She thinks that the pain for the last several years is different than the gallbladder pain.       Patient has been seen in the ER on 6/19 no acute abdominopelvic process seen.  CT was then repeated on the 21st at [facility] that showed no acute finding    Patient denies any heartburn/GERD, N/V, dysphagia, odynophagia, abdominal pain, diarrhea, constipation, hematemesis, hematochezia, melena, or weight loss.      Endoscopy History:  EGD 1/2010: normal  -Colonoscopy 1/2010: normal  -EGD 2/2012: normal esophagus, scattered inflammation, gastric erosions, normal duodenum  -EGD 5/2014: normal esophagus, normal stomach, normal duodenum  -Colonoscopy 6/2014: normal colon  -EGD 1/2018: normal esophagus, gastritis, normal duodenum  -EGD 1/2021: normal esophagus; concern for PHG, gastric submucosal mass, normal duodenum; biopsies unremarkable.  -EUS 1/2021: normal esophagus; 1 cm hiatal hernia; erythematous gastric mucosa; soft bulge which  possibly a GIST versus leiomyoma; normal duodenum; normal pancreas and bile duct. Biopsies showed gastritis and were otherwise normal. Repeat EUS due 1 year for surveillance.  -Colonoscopy 5/4/2021: normal terminal ileum and normal colonic mucosa; random biopsies were negative for microscopic colitis.  - EGD 11/5/2021 by Dr. Nain Gallagher. EGD report not available, however biopsy report noted normal duodenum with negative celiac sprue; and minimal focal chronic inflammation of the gastric antrum; negative H.pylori.      PAST SURGERIES:  -Laparoscopic Cholecystectomy 2009  -Total hysterectomy 4/2012  - Umbilical hernia repair x 2 and adhesions (most recent 4/2018)  - Laparoscopy for adhesions 12/2021 (Dr. Luis E Desai at [facility]      OTHER RECENT IMAGING:  -MRCP 1/19/2020: s/p cholecystectomy; no filling defects; mild hepatosplenomegaly unchanged since 2011.  -Xray Abdomen 2/18/2021: nonobstructive bowl gas pattern; moderate to large amount of residual stool over the distal colon. Clinical correlation for constipation recommended.  - Ultrasound RUQ 8/23/2021: no focal hepatic lesion identified; s/p cholecystectomy. No biliary dilation. Possible small nonobstructing left renal calculus. No hydronephrosis bilaterally.  -CT abdomen pelvis 10/2023 with IV contrast showed normal appendix no bowel obstruction no hydro ureter nephrosis bilaterally.  -Vascular ultrasound done 10/2023 showed previous cholecystectomy, mildly elevated main hepatic artery resistive index Doppler portion exams otherwise within the limits of normal  -CT abdomen/pelvis with IV contrast 10/21/2021: no acute abdominal or pelvic process. Liver within normal limits. Bile ducts normal caliber. Pancreas within normal limits.  CT abdomen pelvis without IV contrast 9/2024 nonobstructive left-sided nephrolithiasis no hydronephrosis or signs obstructive uropathy was seen  Right upper quadrant ultrasound 12/2024 showed Ana cystectomy  changes no correlation seen to explain pain      Review of systems:   Review of Systems      I performed a complete 10 point review of systems and it is negative except as noted in HPI or above.        PAST HISTORIES:       Past Medical History:  She has a past medical history of Arthritis, Depression, Dysuria (05/08/2023), GERD (gastroesophageal reflux disease), Hyperlipidemia, unspecified (04/14/2016), Kidney stones, Migraines, NAFL (nonalcoholic fatty liver), Neuropathic pain of chest (05/08/2023), Pain of right breast (05/08/2023), Personal history of other diseases of the digestive system (07/07/2020), PONV (postoperative nausea and vomiting), Sleep apnea, TMJ (dislocation of temporomandibular joint), TMJ (temporomandibular joint disorder), Urinary urgency (05/08/2023), and Vaginal discharge (05/08/2023).    Past Surgical History:  She has a past surgical history that includes Total abdominal hysterectomy (04/16/2015); Cervical biopsy w/ loop electrode excision (04/16/2015); Cholecystectomy (04/16/2015); Hysterectomy (09/07/2021); Other surgical history (07/08/2020); Other surgical history (07/08/2020); Other surgical history (07/08/2020); Other surgical history (02/16/2021); and Other surgical history (02/16/2021).      Social History:  She reports that she has never smoked. She has never used smokeless tobacco. She reports that she does not drink alcohol and does not use drugs.    Family History:  No known GI disease, specifically denies pancreatitis, Crohn's, colon cancer, gastroesophageal cancer, or ulcerative colitis.    Family History   Problem Relation Name Age of Onset    Diabetes Father      Hypertension Father      Diabetes Father's Sister      Stomach cancer Father's Sister      Diabetes Paternal Grandmother      Hypertension Paternal Grandmother      Coronary artery disease Paternal Grandmother          MI>60s    Diabetes Paternal Cousin          Allergies:  Pregabalin, Bupropion, Gabapentin,  "Hydrochlorothiazide, Naltrexone-bupropion, Ondansetron hcl, Penicillins, Promethazine, and Trazodone        Objective   OBJECTIVE:       Last Recorded Vitals:  Vitals:    06/24/25 1104   BP: 114/74   BP Location: Right arm   Patient Position: Sitting   BP Cuff Size: Adult   Pulse: 73   SpO2: 99%   Weight: 59.9 kg (132 lb)   Height: 1.676 m (5' 6\")     /74 (BP Location: Right arm, Patient Position: Sitting, BP Cuff Size: Adult)   Pulse 73   Ht 1.676 m (5' 6\")   Wt 59.9 kg (132 lb)   SpO2 99%   BMI 21.31 kg/m²      Physical Exam:    Physical Exam  Vitals reviewed.   Constitutional:       General: She is awake.      Appearance: Normal appearance.   HENT:      Head: Normocephalic.      Mouth/Throat:      Mouth: Mucous membranes are moist.   Eyes:      Extraocular Movements: Extraocular movements intact.   Cardiovascular:      Rate and Rhythm: Normal rate.      Heart sounds: Normal heart sounds.   Pulmonary:      Effort: Pulmonary effort is normal.      Breath sounds: Normal breath sounds.   Abdominal:      General: Bowel sounds are normal.      Palpations: Abdomen is soft.      Tenderness: There is no abdominal tenderness. There is no guarding or rebound.      Hernia: No hernia is present.   Musculoskeletal:         General: Normal range of motion.      Cervical back: Neck supple.   Skin:     General: Skin is warm and dry.   Neurological:      General: No focal deficit present.      Mental Status: She is alert.   Psychiatric:         Attention and Perception: Attention and perception normal.         Mood and Affect: Mood normal.         Behavior: Behavior normal.           Home Medications:  Prior to Admission medications    Medication Sig Start Date End Date Taking? Authorizing Provider   acetaminophen (Tylenol) 500 mg tablet Take 2 tablets (1,000 mg) by mouth every 6 hours if needed for mild pain (1 - 3). 12/22/23   Julia Stapleton MD   cholecalciferol (Vitamin D-3) 50 MCG (2000 UT) tablet Take 1 tablet " (50 mcg) by mouth once daily.    Historical Provider, MD   cyclobenzaprine (Flexeril) 10 mg tablet Take 0.5-1 tablets (5-10 mg) by mouth 2 times a day as needed for muscle spasms. 11/18/24 12/18/24  Abiola Muhammad DO   docusate sodium (Colace) 100 mg capsule Take 2 capsules (200 mg) by mouth 2 times a day. 10/11/24   Frances Gardner MD   famotidine (Pepcid) 40 mg tablet Take 1 tablet (40 mg) by mouth once daily at bedtime.    Historical Provider, MD   ibuprofen 600 mg tablet Take 1 tablet (600 mg) by mouth every 8 hours if needed. W/ food and water    Historical Provider, MD   ketorolac (Toradol) 10 mg tablet Take 1 tablet (10 mg) by mouth every 6 hours if needed for moderate pain (4 - 6). 10/11/24   Frances Gardner MD   Lactobacillus acidophilus (PROBIOTIC ACIDOPHILUS ORAL) Probiotic Acidophilus oral capsules    Historical Provider, MD   magnesium hydroxide (Milk of Magnesia) 400 mg/5 mL suspension Shake Well and Take 30 mL by mouth once daily as needed for constipation (if no bowel movementn with colace and miralax). 10/11/24   Frances Gardner MD   meloxicam (Mobic) 15 mg tablet Take 1 tablet (15 mg) by mouth once daily. 4/17/24 4/17/25  Andrew Tyler,    omeprazole (PriLOSEC) 40 mg DR capsule Take 1 capsule (40 mg) by mouth once daily. 7/11/22   Historical Provider, MD   ospemifene (Osphena) 60 mg tablet Take 60 mg by mouth once daily. 5/3/24 5/3/25  Johan Phillips MD   polyethylene glycol (Glycolax, Miralax) 17 gram/dose powder Dissolve 17 grams in liquid as directed and take by mouth once daily. 10/11/24   Frances Gardner MD   semaglutide, weight loss, (Wegovy) 0.25 mg/0.5 mL pen injector Inject 0.25 mg under the skin. 5/4/24      tiZANidine (Zanaflex) 4 mg tablet Take 1 tablet (4 mg) by mouth every 6 hours if needed for muscle spasms. 9/17/24 3/16/25  Johan Phillips MD   traMADol (Ultram) 50 mg tablet Take 1 tablet (50 mg) by mouth every 6 hours if needed for severe pain (7 - 10). 10/11/24   Frances GARCIA  MD Jj   VITAMIN B COMPLEX ORAL Vitamin B Complex oral tablets    Historical Provider, MD         Relevant Results Recent labs reviewed in the EMR.  Lab Results   Component Value Date    HGB 13.9 06/19/2025    HGB 14.6 12/26/2024    HGB 14.8 09/23/2024    MCV 86 06/19/2025    MCV 88 12/26/2024    MCV 89 09/23/2024     06/19/2025     (L) 12/26/2024     09/23/2024       Lab Results   Component Value Date    FERRITIN 199 (H) 09/19/2023    IRON 76 09/19/2023       Lab Results   Component Value Date     06/19/2025    K 3.7 06/19/2025     (H) 06/19/2025    BUN 13 06/19/2025    CREATININE 0.90 06/19/2025       Lab Results   Component Value Date    BILITOT 1.5 (H) 06/19/2025     Lab Results   Component Value Date    ALT 34 06/19/2025    ALT 24 12/23/2024    ALT 25 06/15/2024    AST 22 06/19/2025    AST 19 12/23/2024    AST 21 06/15/2024    ALKPHOS 70 06/19/2025    ALKPHOS 76 12/23/2024    ALKPHOS 70 06/15/2024       Lab Results   Component Value Date    CRP <0.10 02/09/2021    CRP <0.10 11/09/2019       Calprotectin, Stool   Date Value Ref Range Status   02/09/2021 19 <=49 ug/g Final     Comment:     REFERENCE INTERVAL: Calprotectin, Fecal by Immunoassay    Less than 50 ug/g.........Normal     ug/g...............Borderline elevated, test should be                              re-evaluated in 4-6 weeks.    121 ug/g or greater.......Elevated  Performed by [facility],  [address] [place] [id number] [phone]  [email or web address], Rio Childs MD, Lab. Director         Radiology: Reviewed imaging reviewed in the EMR.  US abdomen limited    Result Date: 12/29/2024  Interpreted By:  Antonio Hewitt, STUDY: US ABDOMEN LIMITED;  12/27/2024 7:33 am   INDICATION: Signs/Symptoms:pain, high LFT.   ,R10.11 Right upper quadrant pain   COMPARISON: September 24 CT   ACCESSION NUMBER(S): [id number]   ORDERING CLINICIAN: HOSEA JAMES   TECHNIQUE: Multiple images of the right upper  quadrant were obtained.   FINDINGS: LIVER: The liver measures 15.1 cm no focal liver lesions     GALLBLADDER: Removed.     BILE DUCTS: No evidence of intra or extrahepatic biliary dilatation is identified; the common bile duct measures 0.2 cm. 1.1 cm rounded lesion seen in the right kidney. This is probably a cyst. It is difficult to say with certainty. Consider follow-up in 6 months.   PANCREAS: Not well seen   RIGHT KIDNEY: The right kidney measures 9.6 cm in length. The renal cortical echogenicity and thickness are within normal limit.  No hydronephrosis or renal calculi are seen.       Cholecystectomy changes. No correlate seen to explain pain.   1 cm right renal cyst probably a cyst with proteinaceous debris. Review of prior CT did not show a similar cyst. Consider follow-up renal ultrasound in 6 months to ensure stability.   MACRO: None   Signed by: Antonio Hewitt 12/29/2024 5:37 AM Dictation workstation:   [id number]        DATE OF EXAM: Jun 21 2025 1:57PM    [facility] 0530 - CT ABD/PEL W IVCON / ACCESSION # [id number]    PROCEDURE REASON: adominal pain for 1 week. more right sided. prior  cholecytectomy and hysterectom    * * * * Physician Interpretation * * * *    EXAMINATION: CT ABDOMEN AND PELVIS WITH IV CONTRAST    CLINICAL HISTORY: Rectal bleeding abdominal pain    TECHNIQUE: CT of the abdomen and pelvis was performed using standard  technique, scanning from just above the dome of the diaphragm to the  symphysis pubis.  MQ: CTAP_3    Contrast:  IV: 100 ml of Omnipaque 350  : ml of    CT Radiation dose: Integrated Dose-length product (DLP) for this visit =  245.46 mGy*cm.  CT Dose Reduction Employed: Automated exposure control (AEC)    COMPARISON: None.      RESULT:    Liver: No mass.    Biliary: No bile duct dilation. Cholecystectomy.    Spleen: No mass. No splenomegaly.    Pancreas: No mass or duct dilation.    Adrenals: No mass.    Kidneys: No hydronephrosis.    GI tract: No dilation or wall thickening.  Appendix is not inflamed.    Lymph nodes: No abdominal or pelvic lymphadenopathy.    Mesentery/Peritoneum: No ascites or mass.    Retroperitoneum: No mass.    Vasculature: Aorta is nonaneurysmal.    Pelvis: No mass, ascites or fluid collection.    Bones/Soft Tissues: No acute osseous findings. Degenerative changes.    Lower thorax: Noncontributory.    Localizer images: No additional findings.    IMPRESSION  IMPRESSION:    No acute findings.

## 2025-06-24 NOTE — PATIENT INSTRUCTIONS
We can trial Denice seed oil this is FDgard take this prescribed on the box      I recommend obtaining a gastric emptying study as was ordered after procedures    These follow-up in 4 months

## 2025-06-25 LAB
ALBUMIN SERPL-MCNC: 4.8 G/DL (ref 3.6–5.1)
ALBUMIN SERPL-MCNC: 4.8 G/DL (ref 3.6–5.1)
ALBUMIN/GLOB SERPL: 2.2 (CALC) (ref 1–2.5)
ALP SERPL-CCNC: 80 U/L (ref 31–125)
ALP SERPL-CCNC: 81 U/L (ref 31–125)
ALT SERPL-CCNC: 51 U/L (ref 6–29)
ALT SERPL-CCNC: 53 U/L (ref 6–29)
ANION GAP SERPL CALCULATED.4IONS-SCNC: 10 MMOL/L (CALC) (ref 7–17)
AST SERPL-CCNC: 36 U/L (ref 10–30)
AST SERPL-CCNC: 36 U/L (ref 10–30)
BILIRUB DIRECT SERPL-MCNC: 0.2 MG/DL
BILIRUB INDIRECT SERPL-MCNC: 1.1 MG/DL (CALC) (ref 0.2–1.2)
BILIRUB SERPL-MCNC: 1.3 MG/DL (ref 0.2–1.2)
BILIRUB SERPL-MCNC: 1.3 MG/DL (ref 0.2–1.2)
BUN SERPL-MCNC: 17 MG/DL (ref 7–25)
CALCIUM SERPL-MCNC: 9.5 MG/DL (ref 8.6–10.2)
CHLORIDE SERPL-SCNC: 109 MMOL/L (ref 98–110)
CO2 SERPL-SCNC: 23 MMOL/L (ref 20–32)
CREAT SERPL-MCNC: 0.79 MG/DL (ref 0.5–0.99)
EGFRCR SERPLBLD CKD-EPI 2021: 95 ML/MIN/1.73M2
GLOBULIN SER CALC-MCNC: 2.2 G/DL (CALC) (ref 1.9–3.7)
GLUCOSE SERPL-MCNC: 75 MG/DL (ref 65–99)
POTASSIUM SERPL-SCNC: 4 MMOL/L (ref 3.5–5.3)
PROT SERPL-MCNC: 6.8 G/DL (ref 6.1–8.1)
PROT SERPL-MCNC: 7 G/DL (ref 6.1–8.1)
SODIUM SERPL-SCNC: 142 MMOL/L (ref 135–146)

## 2025-06-26 ENCOUNTER — APPOINTMENT (OUTPATIENT)
Dept: PRIMARY CARE | Facility: CLINIC | Age: 45
End: 2025-06-26
Payer: COMMERCIAL

## 2025-06-26 VITALS
BODY MASS INDEX: 21.47 KG/M2 | WEIGHT: 133 LBS | OXYGEN SATURATION: 96 % | TEMPERATURE: 97.8 F | DIASTOLIC BLOOD PRESSURE: 78 MMHG | HEART RATE: 68 BPM | SYSTOLIC BLOOD PRESSURE: 116 MMHG

## 2025-06-26 DIAGNOSIS — E78.49 OTHER HYPERLIPIDEMIA: ICD-10-CM

## 2025-06-26 DIAGNOSIS — K76.0 NONALCOHOLIC FATTY LIVER DISEASE: Primary | ICD-10-CM

## 2025-06-26 DIAGNOSIS — M54.50 LOW BACK PAIN WITHOUT SCIATICA, UNSPECIFIED BACK PAIN LATERALITY, UNSPECIFIED CHRONICITY: ICD-10-CM

## 2025-06-26 PROCEDURE — 99214 OFFICE O/P EST MOD 30 MIN: CPT | Performed by: FAMILY MEDICINE

## 2025-06-26 PROCEDURE — 1036F TOBACCO NON-USER: CPT | Performed by: FAMILY MEDICINE

## 2025-06-26 ASSESSMENT — ENCOUNTER SYMPTOMS
DIFFICULTY URINATING: 0
DIARRHEA: 0
POLYPHAGIA: 0
FATIGUE: 0
PALPITATIONS: 0
MYALGIAS: 0
VOMITING: 0
SLEEP DISTURBANCE: 0
POLYDIPSIA: 0
HEADACHES: 0
DIZZINESS: 0
DYSPHORIC MOOD: 0
CONSTIPATION: 0
BLOOD IN STOOL: 0
DYSURIA: 0
SHORTNESS OF BREATH: 0
NAUSEA: 0

## 2025-06-26 NOTE — PROGRESS NOTES
Subjective   Patient ID: Ronda Dean is a 45 y.o. female who presents for Hyperlipidemia (Recheck) and Elevated LFTs (Review BW).    HPI   LFTs: high. ALT 51, AST 36, bili 1.3.   GI ordered gastric emptying study.  Plan for calcium  Lipid: Diet controlled  low back pain: Chronic and recurrent.  No injury.  No radiation of pain.  No bowel or bladder changes    Recently seen in the ER for rectal bleeding.  No recurrence.  CT abdomen negative for acute process    Review of Systems   Constitutional:  Negative for fatigue.   HENT: Negative.     Eyes:  Negative for visual disturbance.   Respiratory:  Negative for shortness of breath.    Cardiovascular:  Negative for chest pain and palpitations.   Gastrointestinal:  Negative for blood in stool, constipation, diarrhea, nausea and vomiting.   Endocrine: Negative for cold intolerance, heat intolerance, polydipsia, polyphagia and polyuria.   Genitourinary:  Negative for difficulty urinating and dysuria.   Musculoskeletal:  Negative for myalgias.   Skin:  Negative for rash.   Neurological:  Negative for dizziness and headaches.   Psychiatric/Behavioral:  Negative for dysphoric mood and sleep disturbance.        Objective   /78   Pulse 68   Temp 36.6 °C (97.8 °F)   Wt 60.3 kg (133 lb)   SpO2 96%   BMI 21.47 kg/m²     Physical Exam  Vitals and nursing note reviewed.   Constitutional:       General: She is not in acute distress.     Appearance: Normal appearance. She is not toxic-appearing.   HENT:      Head: Normocephalic.      Mouth/Throat:      Pharynx: Oropharynx is clear.   Eyes:      General: No scleral icterus.     Pupils: Pupils are equal, round, and reactive to light.   Cardiovascular:      Rate and Rhythm: Normal rate and regular rhythm.      Heart sounds: No murmur heard.  Pulmonary:      Effort: Pulmonary effort is normal. No respiratory distress.      Breath sounds: Normal breath sounds.   Abdominal:      General: Bowel sounds are normal.       Palpations: Abdomen is soft.      Tenderness: There is no abdominal tenderness. There is no guarding.   Musculoskeletal:         General: No tenderness.      Right lower leg: No edema.      Left lower leg: No edema.      Comments: L-spine nontender to palpation.  Negative straight leg raise bilaterally   Skin:     General: Skin is warm.   Neurological:      General: No focal deficit present.      Mental Status: She is alert.      Cranial Nerves: No cranial nerve deficit.      Gait: Gait normal.   Psychiatric:         Mood and Affect: Mood normal.         Assessment/Plan   Problem List Items Addressed This Visit           ICD-10-CM    Hyperlipidemia E78.5    Relevant Orders    Comprehensive Metabolic Panel    Nonalcoholic fatty liver disease - Primary K76.0    Relevant Orders    Comprehensive Metabolic Panel    Lipid Panel    Follow Up In Primary Care     Other Visit Diagnoses         Codes      Low back pain without sciatica, unspecified back pain laterality, unspecified chronicity     M54.50    Relevant Orders    XR lumbar spine 2-3 views        Reviewed ER reports and blood work including CMP, CBC.  Reviewed CT abdomen.  Referred for x-rays.  Recommendations given.  Patient will consider PT if low back pain persistent

## 2025-06-26 NOTE — PATIENT INSTRUCTIONS
Recommend a predominant low fat whole foods plant based diet.  Cut back on meat, dairy, processed carbs, salt and oils(especially palm and coconut). Increase fiber in your diet.  Decrease alcohol as much as possible if you drink. Recommend regular exercise most days of the week(goal up to 150min per week). Also recommend good sleep habits aiming for 7-8 hours per night.     You were referred for xay    Return in 3 months, sooner if needed

## 2025-07-09 ENCOUNTER — APPOINTMENT (OUTPATIENT)
Dept: RADIOLOGY | Facility: HOSPITAL | Age: 45
End: 2025-07-09
Payer: COMMERCIAL

## 2025-07-09 DIAGNOSIS — Z12.31 SCREENING MAMMOGRAM FOR BREAST CANCER: ICD-10-CM

## 2025-07-10 ENCOUNTER — APPOINTMENT (OUTPATIENT)
Dept: RADIOLOGY | Facility: HOSPITAL | Age: 45
End: 2025-07-10
Payer: COMMERCIAL

## 2025-07-17 ENCOUNTER — APPOINTMENT (OUTPATIENT)
Dept: RADIOLOGY | Facility: HOSPITAL | Age: 45
End: 2025-07-17
Payer: COMMERCIAL

## 2025-08-13 ENCOUNTER — APPOINTMENT (OUTPATIENT)
Dept: DERMATOLOGY | Facility: CLINIC | Age: 45
End: 2025-08-13
Payer: COMMERCIAL

## 2025-09-04 ENCOUNTER — APPOINTMENT (OUTPATIENT)
Dept: RADIOLOGY | Facility: HOSPITAL | Age: 45
End: 2025-09-04
Payer: COMMERCIAL

## 2025-09-11 ENCOUNTER — APPOINTMENT (OUTPATIENT)
Dept: PRIMARY CARE | Facility: CLINIC | Age: 45
End: 2025-09-11
Payer: COMMERCIAL

## (undated) DEVICE — POSITIONING KIT, PINK PAD XL, ADVANCED TRENDELENBURG

## (undated) DEVICE — TOWEL PACK, STERILE, 4/PACK, BLUE

## (undated) DEVICE — SLING, DESARA, BLUE TV, WITH 2.7 INTRODUCER

## (undated) DEVICE — DRESSING, GAUZE, PETROLATUM, STRIP, XEROFORM, 1 X 8 IN, STERILE

## (undated) DEVICE — GOWN, ASTOUND, L

## (undated) DEVICE — PAD, GROUNDING, ELECTROSURGICAL, W/9 FT CABLE, POLYHESIVE II, ADULT, LF

## (undated) DEVICE — BLADE, SMARTRELEASE, ONYX, CARPAL TUNNEL, DISP

## (undated) DEVICE — ADHESIVE, SKIN, LIQUIBAND EXCEED

## (undated) DEVICE — LUBRICANT, WATER SOLUBLE, BACTERIOSTATIC, 2 OZ, STERILE

## (undated) DEVICE — SPONGE, GAUZE, XRAY DECT, 16 PLY, 4 X 4, W/MASTER DMT,STERILE

## (undated) DEVICE — DRAPE PACK, LAVH, W/ATTACHED LEGGINGS, W/POUCH, 100 X 114 IN, LF, STERILE

## (undated) DEVICE — SOLUTION, IRRIGATION, SODIUM CHLORIDE 0.9%, 1000 ML, POUR BOTTLE

## (undated) DEVICE — SUTURE, MONOCRYL, 4-0, 18 IN, PS2, UNDYED

## (undated) DEVICE — PREP, SCRUB, SKIN, FOAM, HIBICLENS, 4 OZ

## (undated) DEVICE — TIP, SUCTION, YANKAUER, BULB, OPEN TIP, W/O CONTROL VENT, LF, CLEAR

## (undated) DEVICE — SUTURE, ETHILON, 4-0, BLK, MONO, PS-2 18

## (undated) DEVICE — Device

## (undated) DEVICE — IRRIGATION SET, CYSTOSCOPY, REGULATING CLAMP, STRAIGHT, 81 IN

## (undated) DEVICE — GLOVE, PROTEXIS PI CLASSIC, SZ-7.5, PF, LF

## (undated) DEVICE — GLOVE, PROTEXIS PI CLASSIC, SZ-8.0, PF, PF, LF

## (undated) DEVICE — SYRINGE, 10 CC, LUER LOCK

## (undated) DEVICE — APPLICATOR, CHLORAPREP, W/ORANGE TINT, 26ML

## (undated) DEVICE — NEEDLE, SPINAL, 20 G X 3.5 IN, YELLOW HUB

## (undated) DEVICE — PREP TRAY, SKIN, DRY, W/GLOVES

## (undated) DEVICE — BANDAGE, ELASTIC, PREMIUM, SELF-CLOSE, 2 IN X 5 YD, STERILE

## (undated) DEVICE — SYRINGE, HYPODERMIC, CONTROL, LUER LOCK, 10 CC, PLASTIC, STERILE

## (undated) DEVICE — CUFF, TOURNIQUET, 18 X 4, DUAL PORT/SNGL BLADDER, DISP, LF

## (undated) DEVICE — PADDING, CAST, SOFT, 2 X 4YDS

## (undated) DEVICE — TRAY, SURESTEP, URINE METER, 16FR, COMPLETE, W/STATLOCK

## (undated) DEVICE — COVER HANDLE LIGHT, STERIS, BLUE, STERILE

## (undated) DEVICE — RETRACTOR, SURGICAL, RING, PLASTIC, DISPOSABLE

## (undated) DEVICE — SUTURE, VICRYL, 0, 27 IN, CT-2, UNDYED

## (undated) DEVICE — PAD, SANITARY, OBSTETRICAL, W/ADHSV STRIP,11 IN,LF